# Patient Record
Sex: FEMALE | Race: BLACK OR AFRICAN AMERICAN | NOT HISPANIC OR LATINO | Employment: OTHER | URBAN - METROPOLITAN AREA
[De-identification: names, ages, dates, MRNs, and addresses within clinical notes are randomized per-mention and may not be internally consistent; named-entity substitution may affect disease eponyms.]

---

## 2020-06-25 ENCOUNTER — TELEPHONE (OUTPATIENT)
Dept: GERIATRICS | Age: 75
End: 2020-06-25

## 2020-07-14 ENCOUNTER — TELEPHONE (OUTPATIENT)
Dept: GERIATRICS | Age: 75
End: 2020-07-14

## 2020-07-14 NOTE — TELEPHONE ENCOUNTER
Attempted to contact patient's daughter to move appointment from 7/30/20 (with Dr Nuria Olivares) to 7/15/20 (with Dr Tere Halsted); phone line is busy

## 2020-07-15 NOTE — TELEPHONE ENCOUNTER
Spoke with patient's daughter; rescheduled Riri Assessment to 7/20/20   Family conference rescheduled and placed on the waiting list

## 2020-07-22 ENCOUNTER — TELEPHONE (OUTPATIENT)
Dept: GERIATRICS | Age: 75
End: 2020-07-22

## 2020-07-22 NOTE — TELEPHONE ENCOUNTER
Spoke with daughter Germán Claudio to clarify the reason for mom to be seen  She would like both a new PCP and a erika assessment for her mom  She is concerned that her mom is on the right medication and she is running out  After discussing the options and the services we can offer patient will start as PCP patient and as needed will have a erika assessment  Once patients medications are addressed then she wants mom to have a erika assesment and testing  PCP new patient forms will be emailed to daughter at [email or web address]  [address]  [phone]  Telephone Intake PCP     Referral Source: on line       Caller (and relationship to patient): Sean     (relationship to patient): Daughter   Phone Number: [phone]   Is caller POA? no    Reason for choosing Geriatric PCP: New to area wants a provider who specializes in elderly  What is the goal of visit? To make sure mom is has the correct diagnosis and medications

## 2020-07-30 ENCOUNTER — OFFICE VISIT (OUTPATIENT)
Dept: GERIATRICS | Age: 75
End: 2020-07-30
Payer: MEDICARE

## 2020-07-30 VITALS
HEART RATE: 77 BPM | DIASTOLIC BLOOD PRESSURE: 60 MMHG | RESPIRATION RATE: 16 BRPM | SYSTOLIC BLOOD PRESSURE: 120 MMHG | BODY MASS INDEX: 34.45 KG/M2 | TEMPERATURE: 98.2 F | HEIGHT: 65 IN | OXYGEN SATURATION: 96 % | WEIGHT: 206.8 LBS

## 2020-07-30 DIAGNOSIS — E11.9 TYPE 2 DIABETES MELLITUS WITHOUT COMPLICATION, WITHOUT LONG-TERM CURRENT USE OF INSULIN (HCC): ICD-10-CM

## 2020-07-30 DIAGNOSIS — M25.561 CHRONIC PAIN OF BOTH KNEES: ICD-10-CM

## 2020-07-30 DIAGNOSIS — F02.81 LATE ONSET ALZHEIMER'S DISEASE WITH BEHAVIORAL DISTURBANCE (HCC): Primary | ICD-10-CM

## 2020-07-30 DIAGNOSIS — F32.A DEPRESSION, UNSPECIFIED DEPRESSION TYPE: ICD-10-CM

## 2020-07-30 DIAGNOSIS — Z87.440 HISTORY OF RECURRENT UTIS: ICD-10-CM

## 2020-07-30 DIAGNOSIS — F33.1 MODERATE EPISODE OF RECURRENT MAJOR DEPRESSIVE DISORDER (HCC): ICD-10-CM

## 2020-07-30 DIAGNOSIS — M25.562 CHRONIC PAIN OF BOTH KNEES: ICD-10-CM

## 2020-07-30 DIAGNOSIS — E78.5 HYPERLIPIDEMIA, UNSPECIFIED HYPERLIPIDEMIA TYPE: ICD-10-CM

## 2020-07-30 DIAGNOSIS — G30.1 LATE ONSET ALZHEIMER'S DISEASE WITH BEHAVIORAL DISTURBANCE (HCC): Primary | ICD-10-CM

## 2020-07-30 DIAGNOSIS — R41.3 MEMORY LOSS: ICD-10-CM

## 2020-07-30 DIAGNOSIS — E78.49 OTHER HYPERLIPIDEMIA: ICD-10-CM

## 2020-07-30 DIAGNOSIS — G89.29 CHRONIC PAIN OF BOTH KNEES: ICD-10-CM

## 2020-07-30 DIAGNOSIS — G47.9 SLEEP DIFFICULTIES: ICD-10-CM

## 2020-07-30 DIAGNOSIS — E55.9 VITAMIN D DEFICIENCY: ICD-10-CM

## 2020-07-30 PROBLEM — F02.818 LATE ONSET ALZHEIMER'S DISEASE WITH BEHAVIORAL DISTURBANCE (HCC): Status: ACTIVE | Noted: 2020-07-30

## 2020-07-30 PROCEDURE — 99205 OFFICE O/P NEW HI 60 MIN: CPT | Performed by: STUDENT IN AN ORGANIZED HEALTH CARE EDUCATION/TRAINING PROGRAM

## 2020-07-30 RX ORDER — FERROUS SULFATE 325(65) MG
325 TABLET ORAL
COMMUNITY

## 2020-07-30 RX ORDER — TRAZODONE HYDROCHLORIDE 50 MG/1
50 TABLET ORAL
Qty: 90 TABLET | Refills: 2 | Status: CANCELLED | OUTPATIENT
Start: 2020-07-30

## 2020-07-30 RX ORDER — TRAMADOL HYDROCHLORIDE 50 MG/1
50 TABLET ORAL EVERY 6 HOURS PRN
Qty: 30 TABLET | Refills: 2 | Status: CANCELLED | OUTPATIENT
Start: 2020-07-30

## 2020-07-30 RX ORDER — TRAMADOL HYDROCHLORIDE 50 MG/1
50 TABLET ORAL EVERY 6 HOURS PRN
COMMUNITY
End: 2020-10-05 | Stop reason: SINTOL

## 2020-07-30 RX ORDER — BUPROPION HYDROCHLORIDE 150 MG/1
150 TABLET, EXTENDED RELEASE ORAL 2 TIMES DAILY
Qty: 90 TABLET | Refills: 2 | Status: CANCELLED | OUTPATIENT
Start: 2020-07-30

## 2020-07-30 RX ORDER — TRAZODONE HYDROCHLORIDE 50 MG/1
50 TABLET ORAL
COMMUNITY
End: 2020-11-18 | Stop reason: SDUPTHER

## 2020-07-30 RX ORDER — DONEPEZIL HYDROCHLORIDE 10 MG/1
10 TABLET, FILM COATED ORAL
Qty: 90 TABLET | Refills: 2 | Status: CANCELLED | OUTPATIENT
Start: 2020-07-30

## 2020-07-30 RX ORDER — LOSARTAN POTASSIUM 25 MG/1
25 TABLET ORAL DAILY
COMMUNITY
End: 2021-07-06 | Stop reason: SDUPTHER

## 2020-07-30 RX ORDER — AMLODIPINE BESYLATE 10 MG/1
10 TABLET ORAL DAILY
COMMUNITY
End: 2020-12-29 | Stop reason: SDUPTHER

## 2020-07-30 RX ORDER — RISPERIDONE 0.25 MG/1
0.25 TABLET, FILM COATED ORAL 2 TIMES DAILY
Qty: 90 TABLET | Refills: 2 | Status: SHIPPED | OUTPATIENT
Start: 2020-07-30 | End: 2020-11-18 | Stop reason: SDUPTHER

## 2020-07-30 RX ORDER — MEMANTINE HYDROCHLORIDE 10 MG/1
10 TABLET ORAL 2 TIMES DAILY
Qty: 90 TABLET | Refills: 2 | Status: SHIPPED | OUTPATIENT
Start: 2020-07-30 | End: 2020-11-18 | Stop reason: SDUPTHER

## 2020-07-30 RX ORDER — RIBOFLAVIN (VITAMIN B2) 100 MG
100 TABLET ORAL DAILY
COMMUNITY

## 2020-07-30 RX ORDER — CEPHALEXIN 500 MG/1
500 CAPSULE ORAL EVERY 6 HOURS SCHEDULED
COMMUNITY
End: 2021-06-21 | Stop reason: HOSPADM

## 2020-07-30 RX ORDER — CEPHALEXIN 500 MG/1
500 CAPSULE ORAL EVERY 6 HOURS SCHEDULED
Qty: 120 CAPSULE | Refills: 2 | Status: CANCELLED | OUTPATIENT
Start: 2020-07-30

## 2020-07-30 RX ORDER — HYDRALAZINE HYDROCHLORIDE 25 MG/1
25 TABLET, FILM COATED ORAL 3 TIMES DAILY
COMMUNITY
End: 2020-12-24 | Stop reason: SDUPTHER

## 2020-07-30 RX ORDER — DORZOLAMIDE HCL 20 MG/ML
1 SOLUTION/ DROPS OPHTHALMIC 3 TIMES DAILY
COMMUNITY

## 2020-07-30 RX ORDER — ATORVASTATIN CALCIUM 40 MG/1
40 TABLET, FILM COATED ORAL DAILY
Qty: 90 TABLET | Refills: 2 | Status: CANCELLED | OUTPATIENT
Start: 2020-07-30

## 2020-07-30 RX ORDER — SODIUM BICARBONATE 650 MG/1
650 TABLET ORAL 4 TIMES DAILY
COMMUNITY
End: 2020-10-06 | Stop reason: HOSPADM

## 2020-07-30 RX ORDER — MEMANTINE HYDROCHLORIDE 10 MG/1
10 TABLET ORAL 2 TIMES DAILY
COMMUNITY
End: 2020-07-30 | Stop reason: SDUPTHER

## 2020-07-30 RX ORDER — RISPERIDONE 0.25 MG/1
0.25 TABLET, FILM COATED ORAL 2 TIMES DAILY
COMMUNITY
End: 2020-07-30 | Stop reason: SDUPTHER

## 2020-07-30 RX ORDER — CETIRIZINE HYDROCHLORIDE 10 MG/1
10 TABLET ORAL DAILY
COMMUNITY

## 2020-07-30 NOTE — ASSESSMENT & PLAN NOTE
Previously diagnosed with Alzheimer's but no records available  Was being treated with memantine, respiridone, and donepezil  These were refilled recently  See "memory loss" for rest of plan

## 2020-07-30 NOTE — PROGRESS NOTES
Family Medicine Follow-Up Office Visit  Allison Devlin 77 y o  female   MRN: [id number] : 1945  ENCOUNTER: 2020 4:39 PM    Assessment and Plan   Memory loss  Poor short term memory, good long term memory per daughter  F/u visit for memory assessment including mind stream  Ordered TSH, B12 level, folate level, vitamin D level  Late onset Alzheimer's disease with behavioral disturbance [facility])  Previously diagnosed with Alzheimer's but no records available  Was being treated with memantine, respiridone, and donepezil  These were refilled recently  See "memory loss" for rest of plan  History of recurrent UTIs  8 UTIs in past year per daughter  Recently treated with keflex  Plan: UA, Urine Cx, referral to urology  Sleep difficulties  Poor sleep hygiene, sleeps with tv on  Treated with trazodone  Plan: Advised to turn off all electronics min 30 mins before bed time  Discussed sleep hygiene  Will revisit at f/u  Chronic pain of both knees  Being treated with tramadol  Plan: referral to orthopedics  Moderate episode of recurrent major depressive disorder ([facility] [address] )  Depression for most of her life  Treated with Trazodone  Refilled recently  No thoughts of harming self or others  Plan: referral to psychiatry and psychology  Advised to call clinic or go to ED if has thoughts of harming self or others, pt verbalizes understanding and is agreeable  Vitamin D deficiency  Currently on calcifediol  Will continue  Chief Complaint   Visit to establish care  History of Present Illness   Allison Devlin is a 77y o -year-old female who presents today to establish care  Daughter here with pt who is providing history as pt has cognitive impairment  She feels ok  C/o bilaterally knee pain  Daughter states that mother "impulsively blows her nose anytime she sees a tissue box "  · Sleep: sleep is not well  She will be wide awake at 3 am with tv on but then will sleep till 2 in the afternoon   She was taking sleeping pills, last one on Sunday night, and that seems to help regulate her sleep  Will sleep with TV on    · Memory: she can recall things of the past and has decent long-term memory but has a bad short-term memory  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 "I used to, but not now "  · Feeling like others have it better than you? "in the past, but not now "  · Sadness? yes  · Loss of interest? yes  · Social: lives with daughter (moved in this past June), prior to that she lived on her own, and before that with her son for 5 years  Just pt and daughter lives together for the most part  Daughter has a partner that lives with them  · Physical activity: no  · Fear of falling/feeling unsteady when walking? "Sometimes" (per pt)  Has had multiple falls in the past year  · Has a walker but daughter states that she doesn't want her using it only if they go for "long walks outside  She doesn't need it indoors because she starts to hunch over but when she doesn't use it she starts to become unsteady  I encourage her to steady herself "   · She had PT from February through April per daughter  · Difficulty paying bills? No paying responsibilities  · H/o health maintenance:  · Colonoscopy? Had in past   · Mammogram? Had in past   · Vaccines? Up to date as far as they know  · Last physical? About 1 year ago  · Has had 8 UTIs in the past year  · She was in inpatient psych       Past Medical History:   Diagnosis Date    Anxiety     Cholesterol depletion     Confusion     Dementia ([facility] [address] )     Depression     Diabetes ([facility] [address] )     Early onset Alzheimer's dementia ([facility] [address] )     Glaucoma     Hypertension     Hypertension     Memory loss      Family History Problem Relation Age of Onset    Diabetes Mother     Hypertension Mother      Social History     Socioeconomic History    Marital status: Single     Spouse name: Not on file    Number of children: 6    Years of education: 12    Highest education level: Bachelor's degree (khoi barba , BA, AB, BS)   Occupational History    Occupation: retired   Social Needs    Financial resource strain: Not hard at all   Spotster insecurity:     Worry: Never true     Inability: Never true   [place] needs:     Medical: No     Non-medical: No   Tobacco Use    Smoking status: Former Smoker     Types: Cigarettes     Last attempt to quit: 2018     Years since quittin 0    Smokeless tobacco: Never Used   Substance and Sexual Activity    Alcohol use: Not Currently     Frequency: Never    Drug use: Never    Sexual activity: Not Currently   Lifestyle    Physical activity:     Days per week: 0 days     Minutes per session: 0 min    Stress: Very much   Relationships    Social connections:     Talks on phone: Once a week     Gets together: Once a week     Attends Islam service: 1 to 4 times per year     Active member of club or organization: No     Attends meetings of clubs or organizations: Never     Relationship status: Patient refused    Intimate partner violence:     Fear of current or ex partner: Not on file     Emotionally abused: No     Physically abused: Not on file     Forced sexual activity: No   Other Topics Concern    Not on file   Social History Narrative    Not on file       Review of Systems   Review of Systems   Constitutional: Negative for chills and fever  HENT: Positive for rhinorrhea (always had it "all my life ")  Respiratory: Negative for cough and shortness of breath  Cardiovascular: Negative for chest pain  Gastrointestinal: Positive for constipation (sometimes)  Negative for blood in stool and diarrhea  Endocrine: Negative for polyuria  Genitourinary: Negative for frequency  Musculoskeletal: Positive for arthralgias (bilateral knees)  Neurological: Negative for syncope and light-headedness  Psychiatric/Behavioral: Positive for sleep disturbance (per daughter)  All other systems reviewed and are negative  Active Problem List     Patient Active Problem List   Diagnosis    Memory loss    History of recurrent UTIs    Sleep difficulties    Late onset Alzheimer's disease with behavioral disturbance (HCC)    Chronic pain of both knees    Moderate episode of recurrent major depressive disorder ([address] )    Vitamin D deficiency       Past Medical History, Past Surgical History, Family History, and Social History were reviewed and updated today as appropriate  Objective   /60 (BP Location: Left arm, Patient Position: Sitting, Cuff Size: Standard)   Pulse 77   Temp 98 2 °F (36 8 °C) (Temporal)   Resp 16   Ht 5' 5" (1 651 m)   Wt 93 8 kg (206 lb 12 8 oz)   SpO2 96%   BMI 34 41 kg/m²     Physical Exam   Constitutional: She is oriented to person, place, and time  No distress  HENT:   Nose: Nose normal    Mouth/Throat: No oropharyngeal exudate  Eyes: Pupils are equal, round, and reactive to light  Conjunctivae are normal  Right eye exhibits no discharge  Left eye exhibits no discharge  No scleral icterus  Neck: Neck supple  Cardiovascular: Normal rate, regular rhythm, normal heart sounds and intact distal pulses  Exam reveals no gallop and no friction rub  No murmur heard  Pulmonary/Chest: Effort normal and breath sounds normal  No stridor  No respiratory distress  She has no wheezes  She has no rales  She exhibits no tenderness  Abdominal: Soft  Bowel sounds are normal  She exhibits no distension and no mass (on superficial palpation)  There is no tenderness  There is no guarding  Musculoskeletal: She exhibits no edema (of BLE)  Lymphadenopathy:     She has no cervical adenopathy  Neurological: She is alert and oriented to person, place, and time  Skin: Skin is warm and dry  She is not diaphoretic  Psychiatric: She has a normal mood and affect  Pertinent Laboratory/Diagnostic Studies:  No results found for: GLUCOSE, BUN, CREATININE, CALCIUM, NA, K, CO2, CL  No results found for: ALT, AST, GGT, ALKPHOS, BILITOT    No results found for: WBC, HGB, HCT, MCV, PLT    No results found for: TSH    No results found for: CHOL  No results found for: TRIG  No results found for: HDL  No results found for: LDLCALC  No results found for: HGBA1C    No results found for this or any previous visit      Orders Placed This Encounter   Procedures    Urine culture    CBC and Platelet    Comprehensive metabolic panel    TSH, 3rd generation with T4 reflex    Vitamin B12    Folate    Vitamin D 25 hydroxy    UA (URINE) with reflex to Scope    Ambulatory referral to Psychiatry    Ambulatory referral to Urology    Ambulatory referral to Orthopedic Surgery    Ambulatory referral to Psychology         Current Medications     Current Outpatient Medications   Medication Sig Dispense Refill    amLODIPine (NORVASC) 10 mg tablet Take 10 mg by mouth daily      Ascorbic Acid (VITAMIN C) 100 MG tablet Take 100 mg by mouth daily      aspirin (ECOTRIN LOW STRENGTH) 81 mg EC tablet Take 81 mg by mouth daily      atorvastatin (LIPITOR) 40 mg tablet Take 40 mg by mouth daily      Brimonidine Tartrate-Timolol (COMBIGAN OP) Apply to eye      buPROPion (WELLBUTRIN SR) 150 mg 12 hr tablet Take 150 mg by mouth 2 (two) times a day      cephalexin (KEFLEX) 500 mg capsule Take 500 mg by mouth every 6 (six) hours      cetirizine (ZyrTEC) 10 mg tablet Take 10 mg by mouth daily      citalopram (CeleXA) 40 mg tablet Take 40 mg by mouth daily      Docusate Sodium (COLACE PO) Take by mouth      donepezil (ARICEPT) 10 mg tablet Take 10 mg by mouth daily at bedtime      dorzolamide (TRUSOPT) 2 % ophthalmic solution 1 drop 3 (three) times a day      ferrous sulfate 325 (65 Fe) mg tablet Take 325 mg by mouth daily with breakfast      hydrALAZINE (APRESOLINE) 25 mg tablet Take 25 mg by mouth 3 (three) times a day      Latanoprostene Bunod (Vyzulta) 0 024 % SOLN Apply to eye      linaGLIPtin (Tradjenta) 5 MG TABS Take 5 mg by mouth daily      losartan (COZAAR) 25 mg tablet Take 25 mg by mouth daily      memantine (NAMENDA) 10 mg tablet Take 1 tablet (10 mg total) by mouth 2 (two) times a day 90 tablet 2    Multiple Vitamins-Minerals (CENTRUM SILVER PO) Take by mouth      risperiDONE (RisperDAL) 0 25 mg tablet Take 1 tablet (0 25 mg total) by mouth 2 (two) times a day 90 tablet 2    sodium bicarbonate 650 mg tablet Take 650 mg by mouth 4 (four) times a day      traMADol (ULTRAM) 50 mg tablet Take 50 mg by mouth every 6 (six) hours as needed for moderate pain      traZODone (DESYREL) 50 mg tablet Take 50 mg by mouth daily at bedtime      Calcifediol ER (Rayaldee) 30 MCG CPCR Take 30 mcg by mouth daily 90 capsule 2    furosemide (LASIX) 20 mg tablet Take 20 mg by mouth 2 (two) times a day      omeprazole (PriLOSEC) 20 mg delayed release capsule Take 20 mg by mouth daily       No current facility-administered medications for this visit          ALLERGIES:  Allergies   Allergen Reactions    Codeine        Health Maintenance     Health Maintenance   Topic Date Due    Hepatitis C Screening  1945    Medicare Annual Wellness Visit (AWV)  1945    MAMMOGRAM  1945    CRC Screening: Colonoscopy  1945    DTaP,Tdap,and Td Vaccines (1 - Tdap) 06/07/1957    BMI: Followup Plan  06/07/1964    Fall Risk  06/07/2011    Pneumococcal Vaccine: 65+ Years (1 of 2 - PCV13) 06/07/2011    Influenza Vaccine  07/01/2020    BMI: Adult  07/30/2021    Pneumococcal Vaccine: Pediatrics (0 to 5 Years) and At-Risk Patients (6 to 59 Years)  Aged Out    HIB Vaccine  Aged Out    Hepatitis B Vaccine  Aged Out    IPV Vaccine  Aged Out    Hepatitis A Vaccine  Aged Out    Meningococcal ACWY Vaccine  Aged Out    HPV Vaccine  Aged Out       There is no immunization history on file for this patient  Nazia Ching MD   [address]  7/30/2020  4:39 PM    Parts of this note were dictated using M*Modal dictation software and may have sounds-like errors due to variation in pronunciation

## 2020-07-30 NOTE — ASSESSMENT & PLAN NOTE
Poor sleep hygiene, sleeps with tv on  Treated with trazodone  Plan: Advised to turn off all electronics min 30 mins before bed time  Discussed sleep hygiene  Will revisit at f/u

## 2020-07-30 NOTE — ASSESSMENT & PLAN NOTE
8 UTIs in past year per daughter  Recently treated with keflex  Plan: UA, Urine Cx, referral to urology

## 2020-07-30 NOTE — ASSESSMENT & PLAN NOTE
Poor short term memory, good long term memory per daughter  F/u visit for memory assessment including mind stream  Ordered TSH, B12 level, folate level, vitamin D level

## 2020-07-30 NOTE — ASSESSMENT & PLAN NOTE
Depression for most of her life  Treated with Trazodone  Refilled recently  No thoughts of harming self or others  Plan: referral to psychiatry and psychology  Advised to call clinic or go to ED if has thoughts of harming self or others, pt verbalizes understanding and is agreeable

## 2020-07-31 PROBLEM — E78.49 OTHER HYPERLIPIDEMIA: Status: ACTIVE | Noted: 2020-07-31

## 2020-07-31 PROBLEM — E11.9 TYPE 2 DIABETES MELLITUS WITHOUT COMPLICATION, WITHOUT LONG-TERM CURRENT USE OF INSULIN (HCC): Status: ACTIVE | Noted: 2020-07-31

## 2020-08-03 ENCOUNTER — TELEPHONE (OUTPATIENT)
Dept: GERIATRICS | Age: 75
End: 2020-08-03

## 2020-08-03 ENCOUNTER — TELEPHONE (OUTPATIENT)
Dept: PSYCHIATRY | Facility: CLINIC | Age: 75
End: 2020-08-03

## 2020-08-03 NOTE — TELEPHONE ENCOUNTER
Armen Montalvo called and would like to set up an apt for Maria Eugenia Koch can you give her a call back please there is a referral on file  Thank you

## 2020-08-11 ENCOUNTER — OFFICE VISIT (OUTPATIENT)
Dept: OBGYN CLINIC | Facility: CLINIC | Age: 75
End: 2020-08-11
Payer: MEDICARE

## 2020-08-11 ENCOUNTER — TRANSCRIBE ORDERS (OUTPATIENT)
Dept: LAB | Facility: CLINIC | Age: 75
End: 2020-08-11

## 2020-08-11 ENCOUNTER — APPOINTMENT (OUTPATIENT)
Dept: LAB | Facility: CLINIC | Age: 75
End: 2020-08-11
Payer: MEDICARE

## 2020-08-11 ENCOUNTER — APPOINTMENT (OUTPATIENT)
Dept: RADIOLOGY | Facility: AMBULARY SURGERY CENTER | Age: 75
End: 2020-08-11
Payer: MEDICARE

## 2020-08-11 VITALS
WEIGHT: 210 LBS | DIASTOLIC BLOOD PRESSURE: 69 MMHG | HEART RATE: 86 BPM | BODY MASS INDEX: 33.75 KG/M2 | HEIGHT: 66 IN | SYSTOLIC BLOOD PRESSURE: 125 MMHG

## 2020-08-11 DIAGNOSIS — Z87.440 HISTORY OF RECURRENT UTIS: ICD-10-CM

## 2020-08-11 DIAGNOSIS — G89.29 CHRONIC PAIN OF BOTH KNEES: ICD-10-CM

## 2020-08-11 DIAGNOSIS — M25.561 CHRONIC PAIN OF BOTH KNEES: ICD-10-CM

## 2020-08-11 DIAGNOSIS — G89.29 CHRONIC PAIN OF BOTH KNEES: Primary | ICD-10-CM

## 2020-08-11 DIAGNOSIS — G30.1 LATE ONSET ALZHEIMER'S DISEASE WITH BEHAVIORAL DISTURBANCE (HCC): ICD-10-CM

## 2020-08-11 DIAGNOSIS — M25.562 CHRONIC PAIN OF BOTH KNEES: ICD-10-CM

## 2020-08-11 DIAGNOSIS — F02.81 LATE ONSET ALZHEIMER'S DISEASE WITH BEHAVIORAL DISTURBANCE (HCC): ICD-10-CM

## 2020-08-11 DIAGNOSIS — R41.3 MEMORY LOSS: ICD-10-CM

## 2020-08-11 DIAGNOSIS — E11.9 TYPE 2 DIABETES MELLITUS WITHOUT COMPLICATION, WITHOUT LONG-TERM CURRENT USE OF INSULIN (HCC): ICD-10-CM

## 2020-08-11 DIAGNOSIS — M25.561 CHRONIC PAIN OF BOTH KNEES: Primary | ICD-10-CM

## 2020-08-11 DIAGNOSIS — E78.49 OTHER HYPERLIPIDEMIA: ICD-10-CM

## 2020-08-11 DIAGNOSIS — M25.562 CHRONIC PAIN OF BOTH KNEES: Primary | ICD-10-CM

## 2020-08-11 LAB
25(OH)D3 SERPL-MCNC: 74.5 NG/ML (ref 30–100)
ALBUMIN SERPL BCP-MCNC: 3.5 G/DL (ref 3.5–5)
ALP SERPL-CCNC: 93 U/L (ref 46–116)
ALT SERPL W P-5'-P-CCNC: 23 U/L (ref 12–78)
ANION GAP SERPL CALCULATED.3IONS-SCNC: 9 MMOL/L (ref 4–13)
AST SERPL W P-5'-P-CCNC: 16 U/L (ref 5–45)
BACTERIA UR QL AUTO: ABNORMAL /HPF
BILIRUB SERPL-MCNC: 0.48 MG/DL (ref 0.2–1)
BILIRUB UR QL STRIP: NEGATIVE
BUN SERPL-MCNC: 26 MG/DL (ref 5–25)
CALCIUM SERPL-MCNC: 9.5 MG/DL (ref 8.3–10.1)
CHLORIDE SERPL-SCNC: 109 MMOL/L (ref 100–108)
CHOLEST SERPL-MCNC: 154 MG/DL (ref 50–200)
CLARITY UR: CLEAR
CO2 SERPL-SCNC: 24 MMOL/L (ref 21–32)
COLOR UR: YELLOW
CREAT SERPL-MCNC: 1.97 MG/DL (ref 0.6–1.3)
CREAT UR-MCNC: 92.8 MG/DL
ERYTHROCYTE [DISTWIDTH] IN BLOOD BY AUTOMATED COUNT: 13.5 % (ref 11.6–15.1)
EST. AVERAGE GLUCOSE BLD GHB EST-MCNC: 111 MG/DL
FOLATE SERPL-MCNC: >20 NG/ML (ref 3.1–17.5)
GFR SERPL CREATININE-BSD FRML MDRD: 28 ML/MIN/1.73SQ M
GLUCOSE P FAST SERPL-MCNC: 81 MG/DL (ref 65–99)
GLUCOSE UR STRIP-MCNC: NEGATIVE MG/DL
HBA1C MFR BLD: 5.5 %
HCT VFR BLD AUTO: 32.8 % (ref 34.8–46.1)
HDLC SERPL-MCNC: 65 MG/DL
HGB BLD-MCNC: 10 G/DL (ref 11.5–15.4)
HGB UR QL STRIP.AUTO: NEGATIVE
KETONES UR STRIP-MCNC: NEGATIVE MG/DL
LDLC SERPL CALC-MCNC: 81 MG/DL (ref 0–100)
LEUKOCYTE ESTERASE UR QL STRIP: NEGATIVE
MCH RBC QN AUTO: 28.5 PG (ref 26.8–34.3)
MCHC RBC AUTO-ENTMCNC: 30.5 G/DL (ref 31.4–37.4)
MCV RBC AUTO: 93 FL (ref 82–98)
MICROALBUMIN UR-MCNC: 66.2 MG/L (ref 0–20)
MICROALBUMIN/CREAT 24H UR: 71 MG/G CREATININE (ref 0–30)
NITRITE UR QL STRIP: POSITIVE
NON-SQ EPI CELLS URNS QL MICRO: ABNORMAL /HPF
NONHDLC SERPL-MCNC: 89 MG/DL
PH UR STRIP.AUTO: 6 [PH]
PLATELET # BLD AUTO: 221 THOUSANDS/UL (ref 149–390)
PMV BLD AUTO: 10 FL (ref 8.9–12.7)
POTASSIUM SERPL-SCNC: 4.4 MMOL/L (ref 3.5–5.3)
PROT SERPL-MCNC: 6.6 G/DL (ref 6.4–8.2)
PROT UR STRIP-MCNC: NEGATIVE MG/DL
RBC # BLD AUTO: 3.51 MILLION/UL (ref 3.81–5.12)
RBC #/AREA URNS AUTO: ABNORMAL /HPF
SODIUM SERPL-SCNC: 142 MMOL/L (ref 136–145)
SP GR UR STRIP.AUTO: 1.02 (ref 1–1.03)
TRIGL SERPL-MCNC: 41 MG/DL
TSH SERPL DL<=0.05 MIU/L-ACNC: 3.12 UIU/ML (ref 0.36–3.74)
UROBILINOGEN UR QL STRIP.AUTO: 0.2 E.U./DL
VIT B12 SERPL-MCNC: 729 PG/ML (ref 100–900)
WBC # BLD AUTO: 5.48 THOUSAND/UL (ref 4.31–10.16)
WBC #/AREA URNS AUTO: ABNORMAL /HPF

## 2020-08-11 PROCEDURE — 81001 URINALYSIS AUTO W/SCOPE: CPT | Performed by: STUDENT IN AN ORGANIZED HEALTH CARE EDUCATION/TRAINING PROGRAM

## 2020-08-11 PROCEDURE — 87077 CULTURE AEROBIC IDENTIFY: CPT

## 2020-08-11 PROCEDURE — 84443 ASSAY THYROID STIM HORMONE: CPT

## 2020-08-11 PROCEDURE — 86592 SYPHILIS TEST NON-TREP QUAL: CPT

## 2020-08-11 PROCEDURE — 82043 UR ALBUMIN QUANTITATIVE: CPT | Performed by: STUDENT IN AN ORGANIZED HEALTH CARE EDUCATION/TRAINING PROGRAM

## 2020-08-11 PROCEDURE — 82607 VITAMIN B-12: CPT

## 2020-08-11 PROCEDURE — 99204 OFFICE O/P NEW MOD 45 MIN: CPT | Performed by: PHYSICAL MEDICINE & REHABILITATION

## 2020-08-11 PROCEDURE — 87086 URINE CULTURE/COLONY COUNT: CPT

## 2020-08-11 PROCEDURE — 83036 HEMOGLOBIN GLYCOSYLATED A1C: CPT

## 2020-08-11 PROCEDURE — 85027 COMPLETE CBC AUTOMATED: CPT

## 2020-08-11 PROCEDURE — 82746 ASSAY OF FOLIC ACID SERUM: CPT

## 2020-08-11 PROCEDURE — 36415 COLL VENOUS BLD VENIPUNCTURE: CPT

## 2020-08-11 PROCEDURE — 87186 SC STD MICRODIL/AGAR DIL: CPT

## 2020-08-11 PROCEDURE — 82570 ASSAY OF URINE CREATININE: CPT | Performed by: STUDENT IN AN ORGANIZED HEALTH CARE EDUCATION/TRAINING PROGRAM

## 2020-08-11 PROCEDURE — 80061 LIPID PANEL: CPT

## 2020-08-11 PROCEDURE — 73562 X-RAY EXAM OF KNEE 3: CPT

## 2020-08-11 PROCEDURE — 20610 DRAIN/INJ JOINT/BURSA W/O US: CPT | Performed by: PHYSICAL MEDICINE & REHABILITATION

## 2020-08-11 PROCEDURE — 80053 COMPREHEN METABOLIC PANEL: CPT

## 2020-08-11 PROCEDURE — 82306 VITAMIN D 25 HYDROXY: CPT

## 2020-08-11 RX ORDER — TRIAMCINOLONE ACETONIDE 40 MG/ML
40 INJECTION, SUSPENSION INTRA-ARTICULAR; INTRAMUSCULAR
Status: COMPLETED | OUTPATIENT
Start: 2020-08-11 | End: 2020-08-11

## 2020-08-11 RX ORDER — LIDOCAINE HYDROCHLORIDE 10 MG/ML
5 INJECTION, SOLUTION INFILTRATION; PERINEURAL
Status: COMPLETED | OUTPATIENT
Start: 2020-08-11 | End: 2020-08-11

## 2020-08-11 RX ADMIN — LIDOCAINE HYDROCHLORIDE 5 ML: 10 INJECTION, SOLUTION INFILTRATION; PERINEURAL at 15:25

## 2020-08-11 RX ADMIN — TRIAMCINOLONE ACETONIDE 40 MG: 40 INJECTION, SUSPENSION INTRA-ARTICULAR; INTRAMUSCULAR at 15:25

## 2020-08-11 NOTE — PROGRESS NOTES
1  Chronic pain of both knees  XR knee 3 vw left non injury    XR knee 3 vw right non injury    Large joint arthrocentesis: R knee    Large joint arthrocentesis: L knee     Orders Placed This Encounter   Procedures    Large joint arthrocentesis: R knee    Large joint arthrocentesis: L knee    XR knee 3 vw left non injury    XR knee 3 vw right non injury        Imaging Studies (I personally reviewed images in PACS and report):  Bilateral knee x-rays most recent to this encounter reviewed  These images show bilateral patellofemoral arthropathy with decreased joint space in this region along with osteophytosis  Both medial and lateral joint spaces are narrowed with osteophytosis  These findings are consistent with Leretha Hammock grade 4 osteoarthritis  No acute osseous abnormalities  Impression:  Bilateral knee pain likely secondary to osteoarthritis as above  The patient had viscosupplementation that she just finished this past June in [facility]  We discussed different treatment options and decided to proceed with steroid injections of both of her knees  Please see the procedure note below  She had immediate relief of her symptoms afterwards  She can take Tylenol as we discussed  I will see her back in 6 weeks if needed  Return in about 6 weeks (around 9/22/2020)  HPI:  Sneha Delatorre is a 77 y o  female  who presents for evaluation of   Chief Complaint   Patient presents with    Right Knee - Pain    Left Knee - Pain     Most of the HPI is taken from her daughter due to the patient having Alzheimer's dementia  Onset/Mechanism:  Chronic pain that she has had for quite some time now  She was living in [facility] and had gel injections that finish this past June  She had minimal relief from this  Location: In front of the knee  Radiation:  Denies  Quality:  Aching  Provocative:  Stairs  Severity:  Depends on what she is doing  Associated Symptoms:  Trouble walking    Treatment so far: Gel injections in [facility]  Review of Systems   Constitutional: Positive for activity change  Negative for fever  HENT: Negative for sore throat  Eyes: Negative for visual disturbance  Respiratory: Negative for shortness of breath  Cardiovascular: Negative for chest pain  Gastrointestinal: Negative for abdominal pain  Endocrine: Negative for polydipsia  Genitourinary: Negative for difficulty urinating  Musculoskeletal: Positive for arthralgias  Skin: Negative for rash  Allergic/Immunologic: Negative for immunocompromised state  Neurological: Negative for numbness  Hematological: Does not bruise/bleed easily  Psychiatric/Behavioral: Negative for confusion  Following history reviewed and updated:  Past Medical History:   Diagnosis Date    Anxiety     Cholesterol depletion     Confusion     Dementia ([facility] [address] )     Depression     [facility]     Early onset Alzheimer's dementia ([facility] )     Glaucoma     Hypertension     Hypertension     Memory loss      Past Surgical History:   Procedure Laterality Date    HYSTERECTOMY       Social History   Social History     Substance and Sexual Activity   Alcohol Use Not Currently    Frequency: Never     Social History     Substance and Sexual Activity   Drug Use Never     Social History     Tobacco Use   Smoking Status Former Smoker    Types: Cigarettes    Last attempt to quit: 2018    Years since quittin 0   Smokeless Tobacco Never Used     Family History   Problem Relation Age of Onset    Diabetes Mother     Hypertension Mother      Allergies   Allergen Reactions    Codeine         Constitutional:  /69   Pulse 86   Ht 5' 6" (1 676 m)   Wt 95 3 kg (210 lb)   BMI 33 89 kg/m²    General: NAD  Eyes: Anicteric sclerae  Neck: Supple  Lungs: Unlabored breathing  Cardiovascular: No lower extremity edema  Skin: Intact without erythema  Neurologic: Sensation intact to light touch    Psychiatric: Mood and affect are appropriate  Right Knee Exam     Tenderness   The patient is experiencing tenderness in the medial joint line  Range of Motion   Extension: -10   Flexion: abnormal     Other   Erythema: absent  Scars: absent  Sensation: normal  Pulse: present  Swelling: none  Effusion: no effusion present    Comments:  Oblique surgical scar noted on the right leg  Left Knee Exam     Tenderness   The patient is experiencing tenderness in the medial joint line  Range of Motion   Extension: normal   Flexion: abnormal     Other   Erythema: absent  Scars: absent  Sensation: normal  Pulse: present  Swelling: none  Effusion: no effusion present             Large joint arthrocentesis: R knee  Date/Time: 8/11/2020 3:25 PM  Consent given by: patient  Site marked: site marked  Supporting Documentation  Indications: pain   Procedure Details  Location: knee - R knee  Needle size: 20 G  Ultrasound guidance: no  Approach: anterolateral  Medications administered: 5 mL lidocaine 1 %; 40 mg triamcinolone acetonide 40 mg/mL    Patient tolerance: patient tolerated the procedure well with no immediate complications  Dressing:  Sterile dressing applied    A 2 inch needle was used and I was able to hub the entire needle  This makes it fairly certain that I am within the intercondylar notch/joint space  There was no resistance encountered during the actual injection  Large joint arthrocentesis: L knee  Date/Time: 8/11/2020 3:25 PM  Consent given by: patient  Site marked: site marked  Supporting Documentation  Indications: pain   Procedure Details  Location: knee - L knee  Needle size: 20 G  Ultrasound guidance: no  Approach: anterolateral  Medications administered: 5 mL lidocaine 1 %; 40 mg triamcinolone acetonide 40 mg/mL    Patient tolerance: patient tolerated the procedure well with no immediate complications  Dressing:  Sterile dressing applied    A 2 inch needle was used and I was able to hub the entire needle    This makes it fairly certain that I am within the intercondylar notch/joint space  There was no resistance encountered during the actual injection  Portions of the record may have been created with voice recognition software  Occasional wrong word or "sound a like" substitutions may have occurred due to the inherent limitations of voice recognition software  Read the chart carefully and recognize, using context, where substitutions have occurred

## 2020-08-11 NOTE — PATIENT INSTRUCTIONS
You had a cortisone (steroid) injection  There are two medicines in this injection, a numbing medicine and a steroid  The numbing medication component usually takes effect within a few minutes and wears off after a few hours, after which your pain may return  The steroid medication typically takes effect in 3-5 days, although some people have benefits sooner, and lasts for a much longer time  You may take over-the-counter pain medicine  Acetaminophen (Tylenol) may be taken up to 1000 mg every 8 hours up to 3000 mg/day  An extra-strength Tylenol tablet is 500 mg

## 2020-08-12 LAB — RPR SER QL: NORMAL

## 2020-08-13 ENCOUNTER — TELEPHONE (OUTPATIENT)
Dept: GERIATRICS | Age: 75
End: 2020-08-13

## 2020-08-13 ENCOUNTER — TELEPHONE (OUTPATIENT)
Dept: PSYCHIATRY | Facility: CLINIC | Age: 75
End: 2020-08-13

## 2020-08-13 DIAGNOSIS — N30.00 ACUTE CYSTITIS WITHOUT HEMATURIA: Primary | ICD-10-CM

## 2020-08-13 LAB — BACTERIA UR CULT: ABNORMAL

## 2020-08-13 RX ORDER — NITROFURANTOIN 25; 75 MG/1; MG/1
100 CAPSULE ORAL 2 TIMES DAILY
Qty: 10 CAPSULE | Refills: 0 | Status: SHIPPED | OUTPATIENT
Start: 2020-08-13 | End: 2021-06-21 | Stop reason: HOSPADM

## 2020-08-13 NOTE — TELEPHONE ENCOUNTER
----- Message from Benjamin Pressley MD sent at 8/13/2020 11:32 AM EDT -----  Can you please call the patient's daughter and let her know about we just got the final urine culture results back which shows that the patient does have a urinary tract infection  Due to daughter's history of patient's confusion, we will opt to treat    Will send nitrofurantoin to [facility] pharmacy in [place]

## 2020-08-13 NOTE — PROGRESS NOTES
Urine culture positive for E coli susceptible to Macrobid    Her prescription for Macrobid 100 mg b i d  x5 days and to [facility] in [facility] [place]

## 2020-08-17 ENCOUNTER — TELEPHONE (OUTPATIENT)
Dept: GERIATRICS | Age: 75
End: 2020-08-17

## 2020-08-17 NOTE — TELEPHONE ENCOUNTER
LMOM for Klaus regarding psychiatry/psychology referrals and noted that intake dept had reached out last week to schedule per chart  Provided their intake line, [phone], as well as our main office number, [phone], for any questions

## 2020-08-26 NOTE — TELEPHONE ENCOUNTER
Contacted Lucius regarding this referral again  Lucius reports that she takes all phone calls about her mom's appointments and she had not received any messages from psych or our office  She is unable to write down the phone number for scheduling; I offered to email it to her  Emailed the behavioral health intake number [phone] to Lucius at [email or web address]  LCSW to remain available as needed

## 2020-09-04 ENCOUNTER — TELEPHONE (OUTPATIENT)
Dept: GERIATRICS | Age: 75
End: 2020-09-04

## 2020-09-04 NOTE — TELEPHONE ENCOUNTER
MARY CARMEN and emailed [facility] inquiring about scheduling psychiatry/psychology for Alfonso Valencia

## 2020-09-08 NOTE — TELEPHONE ENCOUNTER
Received email response from Nubia stating that they are seeking a psychiatrist in the [place] [facility] [place] area  I sent Lucius a link to explore options for psychiatry on psychologytoday  com and asked that she let our office know when this is scheduled

## 2020-09-22 ENCOUNTER — OFFICE VISIT (OUTPATIENT)
Dept: UROLOGY | Facility: CLINIC | Age: 75
End: 2020-09-22
Payer: MEDICARE

## 2020-09-22 ENCOUNTER — OFFICE VISIT (OUTPATIENT)
Dept: OBGYN CLINIC | Facility: CLINIC | Age: 75
End: 2020-09-22
Payer: MEDICARE

## 2020-09-22 VITALS
SYSTOLIC BLOOD PRESSURE: 146 MMHG | HEART RATE: 60 BPM | HEIGHT: 66 IN | TEMPERATURE: 97.8 F | BODY MASS INDEX: 33.27 KG/M2 | WEIGHT: 207 LBS | DIASTOLIC BLOOD PRESSURE: 76 MMHG

## 2020-09-22 VITALS
TEMPERATURE: 97.5 F | HEART RATE: 69 BPM | SYSTOLIC BLOOD PRESSURE: 156 MMHG | BODY MASS INDEX: 33.41 KG/M2 | DIASTOLIC BLOOD PRESSURE: 68 MMHG | HEIGHT: 66 IN

## 2020-09-22 DIAGNOSIS — Z12.11 ENCOUNTER FOR SCREENING COLONOSCOPY: ICD-10-CM

## 2020-09-22 DIAGNOSIS — G89.29 CHRONIC PAIN OF BOTH KNEES: ICD-10-CM

## 2020-09-22 DIAGNOSIS — M25.561 CHRONIC PAIN OF BOTH KNEES: ICD-10-CM

## 2020-09-22 DIAGNOSIS — M25.562 CHRONIC PAIN OF BOTH KNEES: ICD-10-CM

## 2020-09-22 DIAGNOSIS — M17.0 BILATERAL PRIMARY OSTEOARTHRITIS OF KNEE: Primary | ICD-10-CM

## 2020-09-22 DIAGNOSIS — N30.00 ACUTE CYSTITIS WITHOUT HEMATURIA: ICD-10-CM

## 2020-09-22 DIAGNOSIS — N39.0 URINARY TRACT INFECTION WITHOUT HEMATURIA, SITE UNSPECIFIED: Primary | ICD-10-CM

## 2020-09-22 LAB
POST-VOID RESIDUAL VOLUME, ML POC: 53 ML
SL AMB  POCT GLUCOSE, UA: NORMAL
SL AMB LEUKOCYTE ESTERASE,UA: NORMAL
SL AMB POCT BILIRUBIN,UA: NORMAL
SL AMB POCT BLOOD,UA: NORMAL
SL AMB POCT CLARITY,UA: NORMAL
SL AMB POCT COLOR,UA: YELLOW
SL AMB POCT KETONES,UA: NORMAL
SL AMB POCT NITRITE,UA: POSITIVE
SL AMB POCT PH,UA: 6
SL AMB POCT SPECIFIC GRAVITY,UA: 1.01
SL AMB POCT URINE PROTEIN: NORMAL
SL AMB POCT UROBILINOGEN: 0.2

## 2020-09-22 PROCEDURE — 51798 US URINE CAPACITY MEASURE: CPT | Performed by: PHYSICIAN ASSISTANT

## 2020-09-22 PROCEDURE — 87077 CULTURE AEROBIC IDENTIFY: CPT | Performed by: PHYSICIAN ASSISTANT

## 2020-09-22 PROCEDURE — 81002 URINALYSIS NONAUTO W/O SCOPE: CPT | Performed by: PHYSICIAN ASSISTANT

## 2020-09-22 PROCEDURE — 87186 SC STD MICRODIL/AGAR DIL: CPT | Performed by: PHYSICIAN ASSISTANT

## 2020-09-22 PROCEDURE — 87086 URINE CULTURE/COLONY COUNT: CPT | Performed by: PHYSICIAN ASSISTANT

## 2020-09-22 PROCEDURE — 99214 OFFICE O/P EST MOD 30 MIN: CPT | Performed by: PHYSICAL MEDICINE & REHABILITATION

## 2020-09-22 PROCEDURE — 99203 OFFICE O/P NEW LOW 30 MIN: CPT | Performed by: PHYSICIAN ASSISTANT

## 2020-09-22 RX ORDER — CEPHALEXIN 250 MG/1
250 CAPSULE ORAL EVERY 8 HOURS SCHEDULED
Qty: 15 CAPSULE | Refills: 0 | Status: SHIPPED | OUTPATIENT
Start: 2020-09-22 | End: 2020-09-27

## 2020-09-22 NOTE — PROGRESS NOTES
1  Bilateral primary osteoarthritis of knee  diclofenac sodium (VOLTAREN) 1 %   2  Chronic pain of both knees     3  Encounter for screening colonoscopy  Ambulatory referral for colon cancer education     Orders Placed This Encounter   Procedures    Ambulatory referral for colon cancer education        Imaging Studies (I personally reviewed images in PACS and report):  Bilateral knee x-rays most recent to this encounter reviewed  These images show bilateral patellofemoral arthropathy with decreased joint space in this region along with osteophytosis  Both medial and lateral joint spaces are narrowed with osteophytosis  These findings are consistent with Lizbeth Efren grade 4 osteoarthritis  No acute osseous abnormalities      Impression:  Bilateral knee pain likely secondary to osteoarthritis as above  The patient had viscosupplementation that she just finished this past June in [facility]  We discussed different treatment options and decided to proceed with steroid injections into both of her knees on her initial visit with me  She has had modest relief after these injections  She continues to have tenderness along the medial joint line but is able to continue her activities of daily living  We decided to proceed with a medial  for her left knee as it is more bothersome to her  Can consider using 1 for the right knee if it helps with the left  I have also prescribed her Voltaren gel that she can use as needed  I will see her back in December at which point we can repeat viscosupplementation  Return in about 3 months (around 12/22/2020)  HPI:  Bri Garvey is a 77 y o  female  who presents in follow up  Here for   Chief Complaint   Patient presents with    Follow-up       Date of injury:  Chronic  Trajectory of symptoms:  She finished viscosupplementation series this past June and had a steroid injection a couple of weeks ago by me    Her pain has improved but she continues to have pain along the medial aspect of the knee  Review of Systems   Constitutional: Positive for activity change  Negative for fever  HENT: Negative for sore throat  Eyes: Negative for visual disturbance  Respiratory: Negative for shortness of breath  Cardiovascular: Negative for chest pain  Gastrointestinal: Negative for abdominal pain  Endocrine: Negative for polydipsia  Genitourinary: Negative for difficulty urinating  Musculoskeletal: Positive for arthralgias and gait problem  Skin: Negative for rash  Allergic/Immunologic: Negative for immunocompromised state  Neurological: Negative for weakness and numbness  Hematological: Does not bruise/bleed easily  Psychiatric/Behavioral: Negative for confusion  Following history reviewed and updated:  Past Medical History:   Diagnosis Date    Anxiety     Cholesterol depletion     Confusion     [facility]     Depression     [facility]     [facility] onset [facility]'s dementia ([facility] )     Glaucoma     Hypertension     Hypertension     Memory loss      Past Surgical History:   Procedure Laterality Date    HYSTERECTOMY       Social History   Social History     Substance and Sexual Activity   Alcohol Use Not Currently    Frequency: Never     Social History     Substance and Sexual Activity   Drug Use Never     Social History     Tobacco Use   Smoking Status Former Smoker    Types: Cigarettes    Last attempt to quit: 2018    Years since quittin 1   Smokeless Tobacco Never Used     Family History   Problem Relation Age of Onset    Diabetes Mother     Hypertension Mother      Allergies   Allergen Reactions    Codeine         Constitutional:  /76   Pulse 60   Temp 97 8 °F (36 6 °C)   Ht 5' 6" (1 676 m)   Wt 93 9 kg (207 lb)   BMI 33 41 kg/m²    General: NAD  Eyes: Clear sclerae  ENT: No inflammation, lesion, or mass of lips  No tracheal deviation  Musculoskeletal: As mentioned below    Integumentary: No visible rashes or skin lesions  Pulmonary/Chest: Effort normal  No respiratory distress  Neuro: CN's grossly intact, CLANCY  Psych: Normal affect and judgement  Vascular: WWP  Right Knee Exam     Tenderness   The patient is experiencing tenderness in the medial joint line  Range of Motion   Extension: normal   Flexion: abnormal     Other   Erythema: absent  Scars: absent  Sensation: normal  Pulse: present      Left Knee Exam     Tenderness   The patient is experiencing tenderness in the medial joint line      Range of Motion   Extension: normal   Flexion: abnormal     Other   Erythema: absent  Scars: absent  Sensation: normal  Pulse: present             Procedures

## 2020-09-22 NOTE — PROGRESS NOTES
1  Urinary tract infection without hematuria, site unspecified  POCT urine dip    POCT Measure PVR   2  Acute cystitis without hematuria  cephalexin (KEFLEX) 250 mg capsule    Urine culture    US kidney and bladder with pvr        Assessment and plan:       1  Recurrent urinary infections  - Patient's renal function compromised  Will obtain an US of kidney and bladder to evaluate for urologic abnormality  Will coordinate for cystoscopy for further evaluation given possibility of mesh erosion  Encouraged continued hdyration and continued monitoring  keflex sent for coverage at this time given reported mental status change per family  All questions answered  Efren Antony PA-C      Chief Complaint     Recurrent urinary infections    History of Present Illness     Nadia Khanna is a 77 y o  female presenting today as a new patient for recurrent urinary tract infections  Patient had recently seen her primary care provider  Had noted approximately 8 urinary infections over the course of the past year  Her last urine culture on 08/11/2020 was positive for E coli growth  Patient recently relocated to the area  Denies ever seeing any urologist in the past   Denies any history of  surgical manipulation that she is aware of  She does recall hysterectomy in her 35s for uterine prolapse  Unaware if any vaginal mesh was placed at that time  Patient is asymptomatic of the bacteriuria  Denies any dysuria gross hematuria, suprapubic pressure, flank pain, nausea, vomiting, fevers, or chills  Family notices worsening mental status with infection  Medical comorbidities include diabetes with a recent hemoglobin A1c of 5 5, Alzheimers  Urine dip leukocyte negative, nitrite positive, negative blood    PVR 53mL    Laboratory     Lab Results   Component Value Date    CREATININE 1 97 (H) 08/11/2020       Review of Systems     Review of Systems   Constitutional: Negative for activity change, appetite change, chills, diaphoresis, fatigue, fever and unexpected weight change  Respiratory: Negative for chest tightness and shortness of breath  Cardiovascular: Negative for chest pain, palpitations and leg swelling  Gastrointestinal: Negative for abdominal distention, abdominal pain, constipation, diarrhea, nausea and vomiting  Genitourinary: Negative for decreased urine volume, difficulty urinating, dysuria, enuresis, flank pain, frequency, genital sores, hematuria and urgency  Musculoskeletal: Negative for back pain, gait problem and myalgias  Skin: Negative for color change, pallor, rash and wound  Psychiatric/Behavioral: Negative for behavioral problems  The patient is not nervous/anxious  Allergies     Allergies   Allergen Reactions    Codeine        Physical Exam     Physical Exam  Constitutional:       General: She is not in acute distress  Appearance: Normal appearance  She is normal weight  She is not ill-appearing, toxic-appearing or diaphoretic  HENT:      Head: Normocephalic and atraumatic  Eyes:      General:         Right eye: No discharge  Left eye: No discharge  Conjunctiva/sclera: Conjunctivae normal    Pulmonary:      Effort: Pulmonary effort is normal  No respiratory distress  Musculoskeletal: Normal range of motion  Neurological:      Mental Status: She is alert  Comments: Alert  Periodically confused throughout exam   Psychiatric:         Mood and Affect: Mood normal          Behavior: Behavior normal          Thought Content:  Thought content normal          Judgment: Judgment normal            Vital Signs     Vitals:    09/22/20 1516   BP: 156/68   BP Location: Left arm   Patient Position: Sitting   Cuff Size: Standard   Pulse: 69   Temp: 97 5 °F (36 4 °C)   TempSrc: Temporal   Height: 5' 6" (1 676 m)         Current Medications       Current Outpatient Medications:     amLODIPine (NORVASC) 10 mg tablet, Take 10 mg by mouth daily, Disp: , Rfl:     Ascorbic Acid (VITAMIN C) 100 MG tablet, Take 100 mg by mouth daily, Disp: , Rfl:     aspirin (ECOTRIN LOW STRENGTH) 81 mg EC tablet, Take 81 mg by mouth daily, Disp: , Rfl:     atorvastatin (LIPITOR) 40 mg tablet, Take 40 mg by mouth daily, Disp: , Rfl:     Brimonidine Tartrate-Timolol (COMBIGAN OP), Apply to eye, Disp: , Rfl:     buPROPion (WELLBUTRIN SR) 150 mg 12 hr tablet, Take 150 mg by mouth 2 (two) times a day, Disp: , Rfl:     Calcifediol ER (Rayaldee) 30 MCG CPCR, Take 30 mcg by mouth daily, Disp: 90 capsule, Rfl: 2    cephalexin (KEFLEX) 500 mg capsule, Take 500 mg by mouth every 6 (six) hours, Disp: , Rfl:     cetirizine (ZyrTEC) 10 mg tablet, Take 10 mg by mouth daily, Disp: , Rfl:     citalopram (CeleXA) 40 mg tablet, Take 40 mg by mouth daily, Disp: , Rfl:     diclofenac sodium (VOLTAREN) 1 %, Apply 2 g topically 3 (three) times a day as needed (Pain), Disp: 100 g, Rfl: 1    Docusate Sodium (COLACE PO), Take by mouth, Disp: , Rfl:     donepezil (ARICEPT) 10 mg tablet, Take 10 mg by mouth daily at bedtime, Disp: , Rfl:     dorzolamide (TRUSOPT) 2 % ophthalmic solution, 1 drop 3 (three) times a day, Disp: , Rfl:     ferrous sulfate 325 (65 Fe) mg tablet, Take 325 mg by mouth daily with breakfast, Disp: , Rfl:     furosemide (LASIX) 20 mg tablet, Take 20 mg by mouth 2 (two) times a day, Disp: , Rfl:     hydrALAZINE (APRESOLINE) 25 mg tablet, Take 25 mg by mouth 3 (three) times a day, Disp: , Rfl:     Latanoprostene Bunod (Vyzulta) 0 024 % SOLN, Apply to eye, Disp: , Rfl:     linaGLIPtin (Tradjenta) 5 MG TABS, Take 5 mg by mouth daily, Disp: , Rfl:     losartan (COZAAR) 25 mg tablet, Take 25 mg by mouth daily, Disp: , Rfl:     memantine (NAMENDA) 10 mg tablet, Take 1 tablet (10 mg total) by mouth 2 (two) times a day, Disp: 90 tablet, Rfl: 2    Multiple Vitamins-Minerals (CENTRUM SILVER PO), Take by mouth, Disp: , Rfl:     nitrofurantoin (MACROBID) 100 mg capsule, Take 1 capsule (100 mg total) by mouth 2 (two) times a day, Disp: 10 capsule, Rfl: 0    omeprazole (PriLOSEC) 20 mg delayed release capsule, Take 20 mg by mouth daily, Disp: , Rfl:     risperiDONE (RisperDAL) 0 25 mg tablet, Take 1 tablet (0 25 mg total) by mouth 2 (two) times a day, Disp: 90 tablet, Rfl: 2    sodium bicarbonate 650 mg tablet, Take 650 mg by mouth 4 (four) times a day, Disp: , Rfl:     traMADol (ULTRAM) 50 mg tablet, Take 50 mg by mouth every 6 (six) hours as needed for moderate pain, Disp: , Rfl:     traZODone (DESYREL) 50 mg tablet, Take 50 mg by mouth daily at bedtime, Disp: , Rfl:     cephalexin (KEFLEX) 250 mg capsule, Take 1 capsule (250 mg total) by mouth every 8 (eight) hours for 5 days, Disp: 15 capsule, Rfl: 0      Active Problems     Patient Active Problem List   Diagnosis    Memory loss    History of recurrent UTIs    Sleep difficulties    Late onset Alzheimer's disease with behavioral disturbance (HCC)    Chronic pain of both knees    Moderate episode of recurrent major depressive disorder (HCC)    Vitamin D deficiency    Other hyperlipidemia    Type 2 diabetes mellitus without complication, without long-term current use of insulin (HCC)    Acute cystitis without hematuria    Encounter for screening colonoscopy    Bilateral primary osteoarthritis of knee         Past Medical History     Past Medical History:   Diagnosis Date    Anxiety     Cholesterol depletion     Confusion     [facility]     Depression     [facility]     Early onset [facility]'s dementia [facility]     Glaucoma     Hypertension     Hypertension     Memory loss          Surgical History     Past Surgical History:   Procedure Laterality Date    HYSTERECTOMY           Family History     Family History   Problem Relation Age of Onset    Diabetes Mother     Hypertension Mother        Social History     Social History       Radiology

## 2020-09-24 LAB — BACTERIA UR CULT: ABNORMAL

## 2020-09-29 ENCOUNTER — HOSPITAL ENCOUNTER (OUTPATIENT)
Dept: ULTRASOUND IMAGING | Facility: HOSPITAL | Age: 75
Discharge: HOME | End: 2020-09-29
Payer: MEDICARE

## 2020-09-29 DIAGNOSIS — N30.00 ACUTE CYSTITIS WITHOUT HEMATURIA: ICD-10-CM

## 2020-09-29 PROCEDURE — 51798 US URINE CAPACITY MEASURE: CPT

## 2020-10-05 ENCOUNTER — TELEPHONE (OUTPATIENT)
Dept: PSYCHIATRY | Facility: CLINIC | Age: 75
End: 2020-10-05

## 2020-10-05 ENCOUNTER — OFFICE VISIT (OUTPATIENT)
Dept: GERIATRICS | Age: 75
End: 2020-10-05
Payer: MEDICARE

## 2020-10-05 VITALS
BODY MASS INDEX: 33.23 KG/M2 | WEIGHT: 206.8 LBS | RESPIRATION RATE: 16 BRPM | HEART RATE: 62 BPM | OXYGEN SATURATION: 98 % | TEMPERATURE: 98.9 F | DIASTOLIC BLOOD PRESSURE: 62 MMHG | SYSTOLIC BLOOD PRESSURE: 138 MMHG | HEIGHT: 66 IN

## 2020-10-05 DIAGNOSIS — F33.1 MODERATE EPISODE OF RECURRENT MAJOR DEPRESSIVE DISORDER (HCC): ICD-10-CM

## 2020-10-05 DIAGNOSIS — G30.1 LATE ONSET ALZHEIMER'S DISEASE WITH BEHAVIORAL DISTURBANCE (HCC): Primary | ICD-10-CM

## 2020-10-05 DIAGNOSIS — F02.81 LATE ONSET ALZHEIMER'S DISEASE WITH BEHAVIORAL DISTURBANCE (HCC): Primary | ICD-10-CM

## 2020-10-05 DIAGNOSIS — Z87.440 HISTORY OF RECURRENT UTIS: ICD-10-CM

## 2020-10-05 DIAGNOSIS — Z79.899 POLYPHARMACY: ICD-10-CM

## 2020-10-05 DIAGNOSIS — N18.4 CKD (CHRONIC KIDNEY DISEASE) STAGE 4, GFR 15-29 ML/MIN (HCC): ICD-10-CM

## 2020-10-05 DIAGNOSIS — M17.0 BILATERAL PRIMARY OSTEOARTHRITIS OF KNEE: ICD-10-CM

## 2020-10-05 DIAGNOSIS — E78.49 OTHER HYPERLIPIDEMIA: ICD-10-CM

## 2020-10-05 DIAGNOSIS — E11.9 TYPE 2 DIABETES MELLITUS WITHOUT COMPLICATION, WITHOUT LONG-TERM CURRENT USE OF INSULIN (HCC): ICD-10-CM

## 2020-10-05 DIAGNOSIS — R00.1 BRADYCARDIA: ICD-10-CM

## 2020-10-05 DIAGNOSIS — G47.9 SLEEP DIFFICULTIES: ICD-10-CM

## 2020-10-05 LAB — SL AMB POCT GLUCOSE BLD: 158

## 2020-10-05 PROCEDURE — 82948 REAGENT STRIP/BLOOD GLUCOSE: CPT | Performed by: STUDENT IN AN ORGANIZED HEALTH CARE EDUCATION/TRAINING PROGRAM

## 2020-10-05 PROCEDURE — 93000 ELECTROCARDIOGRAM COMPLETE: CPT | Performed by: STUDENT IN AN ORGANIZED HEALTH CARE EDUCATION/TRAINING PROGRAM

## 2020-10-05 PROCEDURE — 99215 OFFICE O/P EST HI 40 MIN: CPT | Performed by: STUDENT IN AN ORGANIZED HEALTH CARE EDUCATION/TRAINING PROGRAM

## 2020-10-05 RX ORDER — BRINZOLAMIDE 1 %
SUSPENSION, DROPS(FINAL DOSAGE FORM)(ML) OPHTHALMIC (EYE)
COMMUNITY
Start: 2020-10-01

## 2020-10-06 ENCOUNTER — TELEPHONE (OUTPATIENT)
Dept: UROLOGY | Facility: CLINIC | Age: 75
End: 2020-10-06

## 2020-10-06 PROBLEM — Z79.899 POLYPHARMACY: Status: ACTIVE | Noted: 2020-10-06

## 2020-10-18 DIAGNOSIS — N30.00 ACUTE CYSTITIS WITHOUT HEMATURIA: ICD-10-CM

## 2020-10-19 RX ORDER — CEPHALEXIN 250 MG/1
CAPSULE ORAL
Qty: 15 CAPSULE | Refills: 0 | OUTPATIENT
Start: 2020-10-19

## 2020-10-20 ENCOUNTER — PREP FOR PROCEDURE (OUTPATIENT)
Dept: GASTROENTEROLOGY | Facility: CLINIC | Age: 75
End: 2020-10-20

## 2020-10-20 DIAGNOSIS — Z12.11 SPECIAL SCREENING FOR MALIGNANT NEOPLASMS, COLON: Primary | ICD-10-CM

## 2020-10-27 ENCOUNTER — TELEPHONE (OUTPATIENT)
Dept: UROLOGY | Facility: CLINIC | Age: 75
End: 2020-10-27

## 2020-11-03 ENCOUNTER — HOSPITAL ENCOUNTER (OUTPATIENT)
Dept: NON INVASIVE DIAGNOSTICS | Facility: CLINIC | Age: 75
Discharge: HOME | End: 2020-11-03
Payer: MEDICARE

## 2020-11-03 ENCOUNTER — TELEPHONE (OUTPATIENT)
Dept: NEPHROLOGY | Facility: CLINIC | Age: 75
End: 2020-11-03

## 2020-11-03 DIAGNOSIS — R00.1 BRADYCARDIA: ICD-10-CM

## 2020-11-03 PROCEDURE — 93226 XTRNL ECG REC<48 HR SCAN A/R: CPT

## 2020-11-03 PROCEDURE — 93225 XTRNL ECG REC<48 HRS REC: CPT

## 2020-11-04 ENCOUNTER — TELEMEDICINE (OUTPATIENT)
Dept: NEPHROLOGY | Facility: CLINIC | Age: 75
End: 2020-11-04
Payer: MEDICARE

## 2020-11-04 DIAGNOSIS — I12.9 TYPE 2 DIABETES MELLITUS WITH STAGE 4 CHRONIC KIDNEY DISEASE AND HYPERTENSION (HCC): Primary | ICD-10-CM

## 2020-11-04 DIAGNOSIS — N18.4 TYPE 2 DIABETES MELLITUS WITH STAGE 4 CHRONIC KIDNEY DISEASE AND HYPERTENSION (HCC): Primary | ICD-10-CM

## 2020-11-04 DIAGNOSIS — E55.9 VITAMIN D DEFICIENCY: ICD-10-CM

## 2020-11-04 DIAGNOSIS — E11.22 TYPE 2 DIABETES MELLITUS WITH STAGE 4 CHRONIC KIDNEY DISEASE AND HYPERTENSION (HCC): Primary | ICD-10-CM

## 2020-11-04 PROCEDURE — 99204 OFFICE O/P NEW MOD 45 MIN: CPT | Performed by: INTERNAL MEDICINE

## 2020-11-17 PROCEDURE — 93227 XTRNL ECG REC<48 HR R&I: CPT | Performed by: INTERNAL MEDICINE

## 2020-11-18 ENCOUNTER — TELEMEDICINE (OUTPATIENT)
Dept: GERIATRICS | Age: 75
End: 2020-11-18
Payer: MEDICARE

## 2020-11-18 ENCOUNTER — OFFICE VISIT (OUTPATIENT)
Dept: PSYCHIATRY | Facility: CLINIC | Age: 75
End: 2020-11-18
Payer: MEDICARE

## 2020-11-18 DIAGNOSIS — G47.9 SLEEP DIFFICULTIES: ICD-10-CM

## 2020-11-18 DIAGNOSIS — G89.29 CHRONIC PAIN OF BOTH KNEES: ICD-10-CM

## 2020-11-18 DIAGNOSIS — F02.80 LATE ONSET ALZHEIMER'S DISEASE WITHOUT BEHAVIORAL DISTURBANCE (HCC): Primary | ICD-10-CM

## 2020-11-18 DIAGNOSIS — F02.81 LATE ONSET ALZHEIMER'S DISEASE WITH BEHAVIORAL DISTURBANCE (HCC): ICD-10-CM

## 2020-11-18 DIAGNOSIS — F41.9 ANXIETY: ICD-10-CM

## 2020-11-18 DIAGNOSIS — R26.81 GAIT INSTABILITY: ICD-10-CM

## 2020-11-18 DIAGNOSIS — F33.1 MODERATE EPISODE OF RECURRENT MAJOR DEPRESSIVE DISORDER (HCC): ICD-10-CM

## 2020-11-18 DIAGNOSIS — G30.1 LATE ONSET ALZHEIMER'S DISEASE WITH BEHAVIORAL DISTURBANCE (HCC): ICD-10-CM

## 2020-11-18 DIAGNOSIS — M25.562 CHRONIC PAIN OF BOTH KNEES: ICD-10-CM

## 2020-11-18 DIAGNOSIS — I12.9 TYPE 2 DIABETES MELLITUS WITH STAGE 4 CHRONIC KIDNEY DISEASE AND HYPERTENSION (HCC): ICD-10-CM

## 2020-11-18 DIAGNOSIS — M25.561 CHRONIC PAIN OF BOTH KNEES: ICD-10-CM

## 2020-11-18 DIAGNOSIS — E11.22 TYPE 2 DIABETES MELLITUS WITH STAGE 4 CHRONIC KIDNEY DISEASE AND HYPERTENSION (HCC): ICD-10-CM

## 2020-11-18 DIAGNOSIS — G30.1 LATE ONSET ALZHEIMER'S DISEASE WITHOUT BEHAVIORAL DISTURBANCE (HCC): Primary | ICD-10-CM

## 2020-11-18 DIAGNOSIS — N18.4 TYPE 2 DIABETES MELLITUS WITH STAGE 4 CHRONIC KIDNEY DISEASE AND HYPERTENSION (HCC): ICD-10-CM

## 2020-11-18 PROCEDURE — 99215 OFFICE O/P EST HI 40 MIN: CPT | Performed by: STUDENT IN AN ORGANIZED HEALTH CARE EDUCATION/TRAINING PROGRAM

## 2020-11-18 PROCEDURE — 90792 PSYCH DIAG EVAL W/MED SRVCS: CPT | Performed by: PSYCHIATRY & NEUROLOGY

## 2020-11-18 RX ORDER — TRAZODONE HYDROCHLORIDE 50 MG/1
50 TABLET ORAL
Qty: 90 TABLET | Refills: 1 | Status: ON HOLD | OUTPATIENT
Start: 2020-11-18 | End: 2021-06-20

## 2020-11-18 RX ORDER — CITALOPRAM 40 MG/1
40 TABLET ORAL DAILY
Qty: 90 TABLET | Refills: 1 | Status: SHIPPED | OUTPATIENT
Start: 2020-11-18 | End: 2021-07-06 | Stop reason: SDUPTHER

## 2020-11-18 RX ORDER — BUPROPION HYDROCHLORIDE 150 MG/1
150 TABLET, EXTENDED RELEASE ORAL 2 TIMES DAILY
Qty: 180 TABLET | Refills: 1 | Status: SHIPPED | OUTPATIENT
Start: 2020-11-18 | End: 2021-07-06 | Stop reason: SDUPTHER

## 2020-11-18 RX ORDER — MEMANTINE HYDROCHLORIDE 10 MG/1
10 TABLET ORAL 2 TIMES DAILY
Qty: 90 TABLET | Refills: 1 | Status: SHIPPED | OUTPATIENT
Start: 2020-11-18 | End: 2021-03-08

## 2020-11-18 RX ORDER — DONEPEZIL HYDROCHLORIDE 10 MG/1
10 TABLET, FILM COATED ORAL
Qty: 90 TABLET | Refills: 1 | Status: SHIPPED | OUTPATIENT
Start: 2020-11-18 | End: 2021-07-06 | Stop reason: SDUPTHER

## 2020-11-18 RX ORDER — RISPERIDONE 0.25 MG/1
0.25 TABLET, FILM COATED ORAL 2 TIMES DAILY
Qty: 90 TABLET | Refills: 1 | Status: SHIPPED | OUTPATIENT
Start: 2020-11-18 | End: 2021-04-05

## 2020-11-19 ENCOUNTER — TELEPHONE (OUTPATIENT)
Dept: GERIATRICS | Age: 75
End: 2020-11-19

## 2020-12-22 ENCOUNTER — TELEPHONE (OUTPATIENT)
Dept: GERIATRICS | Age: 75
End: 2020-12-22

## 2020-12-24 ENCOUNTER — TELEMEDICINE (OUTPATIENT)
Dept: GERIATRICS | Age: 75
End: 2020-12-24
Payer: MEDICARE

## 2020-12-24 DIAGNOSIS — N18.4 TYPE 2 DIABETES MELLITUS WITH STAGE 4 CHRONIC KIDNEY DISEASE AND HYPERTENSION (HCC): ICD-10-CM

## 2020-12-24 DIAGNOSIS — I10 ESSENTIAL HYPERTENSION: ICD-10-CM

## 2020-12-24 DIAGNOSIS — E11.22 TYPE 2 DIABETES MELLITUS WITH STAGE 4 CHRONIC KIDNEY DISEASE AND HYPERTENSION (HCC): ICD-10-CM

## 2020-12-24 DIAGNOSIS — R42 VERTIGO: Primary | ICD-10-CM

## 2020-12-24 DIAGNOSIS — I12.9 TYPE 2 DIABETES MELLITUS WITH STAGE 4 CHRONIC KIDNEY DISEASE AND HYPERTENSION (HCC): ICD-10-CM

## 2020-12-24 DIAGNOSIS — F02.80 LATE ONSET ALZHEIMER'S DISEASE WITHOUT BEHAVIORAL DISTURBANCE (HCC): ICD-10-CM

## 2020-12-24 DIAGNOSIS — G30.1 LATE ONSET ALZHEIMER'S DISEASE WITHOUT BEHAVIORAL DISTURBANCE (HCC): ICD-10-CM

## 2020-12-24 PROCEDURE — 99214 OFFICE O/P EST MOD 30 MIN: CPT | Performed by: STUDENT IN AN ORGANIZED HEALTH CARE EDUCATION/TRAINING PROGRAM

## 2020-12-24 RX ORDER — MECLIZINE HYDROCHLORIDE 25 MG/1
25 TABLET ORAL 2 TIMES DAILY
Qty: 30 TABLET | Refills: 0 | Status: SHIPPED | OUTPATIENT
Start: 2020-12-24 | End: 2021-07-06 | Stop reason: SDUPTHER

## 2020-12-24 RX ORDER — HYDRALAZINE HYDROCHLORIDE 25 MG/1
25 TABLET, FILM COATED ORAL 3 TIMES DAILY
Qty: 90 TABLET | Refills: 1 | Status: SHIPPED | OUTPATIENT
Start: 2020-12-24 | End: 2021-01-20

## 2020-12-29 DIAGNOSIS — E11.9 TYPE 2 DIABETES MELLITUS WITHOUT COMPLICATION, WITHOUT LONG-TERM CURRENT USE OF INSULIN (HCC): ICD-10-CM

## 2020-12-29 DIAGNOSIS — I10 ESSENTIAL HYPERTENSION: Primary | ICD-10-CM

## 2020-12-29 RX ORDER — LINAGLIPTIN 5 MG/1
5 TABLET, FILM COATED ORAL DAILY
Qty: 90 TABLET | Refills: 0 | Status: SHIPPED | OUTPATIENT
Start: 2020-12-29 | End: 2021-04-01

## 2020-12-29 RX ORDER — AMLODIPINE BESYLATE 10 MG/1
10 TABLET ORAL DAILY
Qty: 90 TABLET | Refills: 0 | Status: SHIPPED | OUTPATIENT
Start: 2020-12-29 | End: 2021-03-24

## 2020-12-31 DIAGNOSIS — E78.49 OTHER HYPERLIPIDEMIA: Primary | ICD-10-CM

## 2020-12-31 RX ORDER — ATORVASTATIN CALCIUM 40 MG/1
40 TABLET, FILM COATED ORAL DAILY
Qty: 90 TABLET | Refills: 1 | Status: SHIPPED | OUTPATIENT
Start: 2020-12-31 | End: 2021-06-25

## 2021-01-04 ENCOUNTER — OFFICE VISIT (OUTPATIENT)
Dept: OBGYN CLINIC | Facility: CLINIC | Age: 76
End: 2021-01-04
Payer: MEDICARE

## 2021-01-04 VITALS
HEIGHT: 66 IN | SYSTOLIC BLOOD PRESSURE: 149 MMHG | WEIGHT: 206 LBS | HEART RATE: 79 BPM | DIASTOLIC BLOOD PRESSURE: 76 MMHG | BODY MASS INDEX: 33.11 KG/M2

## 2021-01-04 DIAGNOSIS — M17.0 BILATERAL PRIMARY OSTEOARTHRITIS OF KNEE: Primary | ICD-10-CM

## 2021-01-04 PROCEDURE — 99214 OFFICE O/P EST MOD 30 MIN: CPT | Performed by: PHYSICAL MEDICINE & REHABILITATION

## 2021-01-04 PROCEDURE — 20610 DRAIN/INJ JOINT/BURSA W/O US: CPT | Performed by: PHYSICAL MEDICINE & REHABILITATION

## 2021-01-04 RX ORDER — TRIAMCINOLONE ACETONIDE 40 MG/ML
40 INJECTION, SUSPENSION INTRA-ARTICULAR; INTRAMUSCULAR
Status: COMPLETED | OUTPATIENT
Start: 2021-01-04 | End: 2021-01-04

## 2021-01-04 RX ORDER — LIDOCAINE HYDROCHLORIDE 10 MG/ML
3 INJECTION, SOLUTION INFILTRATION; PERINEURAL
Status: COMPLETED | OUTPATIENT
Start: 2021-01-04 | End: 2021-01-04

## 2021-01-04 RX ADMIN — LIDOCAINE HYDROCHLORIDE 3 ML: 10 INJECTION, SOLUTION INFILTRATION; PERINEURAL at 14:14

## 2021-01-04 RX ADMIN — TRIAMCINOLONE ACETONIDE 40 MG: 40 INJECTION, SUSPENSION INTRA-ARTICULAR; INTRAMUSCULAR at 14:14

## 2021-01-04 NOTE — PROGRESS NOTES
1  Bilateral primary osteoarthritis of knee  Large joint arthrocentesis: R knee    Large joint arthrocentesis: L knee    Injection procedure prior authorization     Orders Placed This Encounter   Procedures    Large joint arthrocentesis: R knee    Large joint arthrocentesis: L knee    Injection procedure prior authorization        Imaging Studies (I personally reviewed images in PACS and report):  Bilateral knee x-rays most recent to this encounter reviewed   These images show bilateral patellofemoral arthropathy with decreased joint space in this region along with osteophytosis   Both medial and lateral joint spaces are narrowed with osteophytosis   These findings are consistent with Adelita Panda grade 4 osteoarthritis   No acute osseous abnormalities      Impression:  Bilateral knee pain likely secondary to osteoarthritis as above   The patient had viscosupplementation that she just finished this past June in [address] [place] [address] discussed different treatment options and decided to proceed with steroid injections into both of her knees as she has had relief for about 3 months with each time we have done the injections  She continues to have tenderness along the medial joint line but is able to continue her activities of daily living  She has been using a medial  for the left knee and we provided her with a hinged knee brace for the right knee  I had also prescribed her Voltaren gel that she can use as needed  I will see her back in 4 weeks to start the viscosupplementation series  This will allow some time for the injectate we put in there today to settle/diffuse  Return in about 4 weeks (around 2/1/2021) for Return to clinic for viscosupplementation series  HPI:  Ratna Reece is a 77 y o  female  who presents in follow up  Here for   Chief Complaint   Patient presents with    Follow-up       Date of injury:  Chronic  Trajectory of symptoms:  Last visit with me was back in September 2020  She did well after the steroid injections at this time  She is requesting the same today  The patient is accompanied by her daughter who provides most of the information  Review of Systems   Constitutional: Positive for activity change  Negative for fever  HENT: Negative for sore throat  Eyes: Negative for visual disturbance  Respiratory: Negative for shortness of breath  Cardiovascular: Negative for chest pain  Gastrointestinal: Negative for abdominal pain  Endocrine: Negative for polydipsia  Genitourinary: Negative for difficulty urinating  Musculoskeletal: Positive for arthralgias  Skin: Negative for rash  Allergic/Immunologic: Negative for immunocompromised state  Neurological: Negative for numbness  Hematological: Does not bruise/bleed easily  Psychiatric/Behavioral: Negative for confusion  Following history reviewed and updated:  Past Medical History:   Diagnosis Date    Anxiety     Cholesterol depletion     Confusion     [facility]     Depression     [facility]     [facility]     Glaucoma     Hypertension     Hypertension     Memory loss      Past Surgical History:   Procedure Laterality Date    HYSTERECTOMY       Social History   Social History     Substance and Sexual Activity   Alcohol Use Not Currently    Frequency: Never     Social History     Substance and Sexual Activity   Drug Use Never     Social History     Tobacco Use   Smoking Status Former Smoker    Types: Cigarettes    Quit date: 2018    Years since quittin 4   Smokeless Tobacco Never Used     Family History   Problem Relation Age of Onset    Diabetes Mother     Hypertension Mother      Allergies   Allergen Reactions    Codeine         Constitutional:  /76   Pulse 79   Ht 5' 6" (1 676 m)   Wt 93 4 kg (206 lb)   BMI 33 25 kg/m²    General: NAD  Eyes: Clear sclerae  ENT: No inflammation, lesion, or mass of lips    No tracheal deviation  Musculoskeletal: As mentioned below  Integumentary: No visible rashes or skin lesions  Pulmonary/Chest: Effort normal  No respiratory distress  Neuro: CN's grossly intact, CLANCY  Psych: Normal affect and judgement  Vascular: WWP  Right Knee Exam     Tenderness   The patient is experiencing tenderness in the medial joint line and lateral joint line  Range of Motion   Extension: normal   Flexion:  100 abnormal     Other   Erythema: absent  Scars: absent  Sensation: normal  Pulse: present    Comments:  Injection site was CDI  Left Knee Exam     Tenderness   The patient is experiencing tenderness in the lateral joint line and medial joint line  Range of Motion   Extension: normal   Flexion:  110 abnormal     Other   Erythema: absent  Scars: absent  Sensation: normal  Pulse: present    Comments:  Injection site was CDI  Large joint arthrocentesis: R knee  Universal Protocol:  Consent given by: patient  Timeout called at: 1/4/2021 2:08 PM   Site marked: the operative site was marked  Supporting Documentation  Indications: pain   Procedure Details  Location: knee - R knee  Needle gauge: 21G 2''  Ultrasound guidance: no  Approach: Inferolateral to patella  Medications administered: 40 mg triamcinolone acetonide 40 mg/mL; 3 mL lidocaine 1 %    Patient tolerance: patient tolerated the procedure well with no immediate complications  Dressing:  Sterile dressing applied    A 2 inch needle was used and I was able to hub the entire needle  This makes it fairly certain that I am within the intercondylar notch/joint space  There was little to no resistance encountered during the injection  Prior to the procedure, the patient was informed of the following risks in layman terms:    - Risk of bleeding since a needle is involved  - Risk of infection (1/10,000 chance as per recent studies)    Signs/symptoms were discussed and they would prompt an urgent evaluation at an emergency department   - Risk of pigmentation or skin dimpling in the skin (2-3% chance as per recent studies) from the steroid  - Risk of increased pain from steroid flare (1% chance as per recent studies) that typically lasts 24-48 hours  - Risk of increased blood sugars from the steroid medication that can last for a few weeks  If the patient is a diabetic or pre-diabetic, they were encouraged to closely monitor their blood sugars and discuss with PCP if elevated more than usual or if having symptoms  After going over these risks, we decided that the benefits outweigh the risks and proceeded with the procedure  Large joint arthrocentesis: L knee  Universal Protocol:  Consent given by: patient  Timeout called at: 1/4/2021 2:08 PM   Site marked: the operative site was marked  Supporting Documentation  Indications: pain   Procedure Details  Location: knee - L knee  Needle gauge: 21G 2''  Ultrasound guidance: no  Approach: Inferolateral to patella  Medications administered: 40 mg triamcinolone acetonide 40 mg/mL; 3 mL lidocaine 1 %    Patient tolerance: patient tolerated the procedure well with no immediate complications  Dressing:  Sterile dressing applied    A 2 inch needle was used and I was able to hub the entire needle  This makes it fairly certain that I am within the intercondylar notch/joint space  There was little to no resistance encountered during the injection  Prior to the procedure, the patient was informed of the following risks in layman terms:    - Risk of bleeding since a needle is involved  - Risk of infection (1/10,000 chance as per recent studies)  Signs/symptoms were discussed and they would prompt an urgent evaluation at an emergency department   - Risk of pigmentation or skin dimpling in the skin (2-3% chance as per recent studies) from the steroid  - Risk of increased pain from steroid flare (1% chance as per recent studies) that typically lasts 24-48 hours    - Risk of increased blood sugars from the steroid medication that can last for a few weeks  If the patient is a diabetic or pre-diabetic, they were encouraged to closely monitor their blood sugars and discuss with PCP if elevated more than usual or if having symptoms  After going over these risks, we decided that the benefits outweigh the risks and proceeded with the procedure

## 2021-01-14 ENCOUNTER — TELEPHONE (OUTPATIENT)
Dept: GASTROENTEROLOGY | Facility: CLINIC | Age: 76
End: 2021-01-14

## 2021-01-14 NOTE — TELEPHONE ENCOUNTER
Colon on 1/28/21 with Dr Verona Aguilar at [facility]  Miralax/dulcolax prep instructions mailed & emailed to address on file

## 2021-01-20 DIAGNOSIS — I10 ESSENTIAL HYPERTENSION: ICD-10-CM

## 2021-01-20 RX ORDER — HYDRALAZINE HYDROCHLORIDE 25 MG/1
TABLET, FILM COATED ORAL
Qty: 90 TABLET | Refills: 1 | Status: SHIPPED | OUTPATIENT
Start: 2021-01-20 | End: 2021-04-02

## 2021-01-27 NOTE — TELEPHONE ENCOUNTER
Miralax/dulcolax prep instructions emailed to patients daughter at [email or web address]

## 2021-01-28 ENCOUNTER — HOSPITAL ENCOUNTER (OUTPATIENT)
Dept: GASTROENTEROLOGY | Facility: AMBULARY SURGERY CENTER | Age: 76
Setting detail: OUTPATIENT SURGERY
Discharge: HOME | End: 2021-01-28
Attending: INTERNAL MEDICINE

## 2021-01-29 ENCOUNTER — TELEPHONE (OUTPATIENT)
Dept: OBGYN CLINIC | Facility: CLINIC | Age: 76
End: 2021-01-29

## 2021-01-29 NOTE — TELEPHONE ENCOUNTER
Called and [facility] for patient to return our call for rescheduling of appointment on 2/15 with Dr Ann Hilliard

## 2021-02-08 ENCOUNTER — OFFICE VISIT (OUTPATIENT)
Dept: OBGYN CLINIC | Facility: CLINIC | Age: 76
End: 2021-02-08
Payer: MEDICARE

## 2021-02-08 VITALS
SYSTOLIC BLOOD PRESSURE: 129 MMHG | BODY MASS INDEX: 33.11 KG/M2 | DIASTOLIC BLOOD PRESSURE: 82 MMHG | WEIGHT: 206 LBS | HEART RATE: 82 BPM | HEIGHT: 66 IN

## 2021-02-08 DIAGNOSIS — M17.0 BILATERAL PRIMARY OSTEOARTHRITIS OF KNEE: Primary | ICD-10-CM

## 2021-02-08 PROCEDURE — 20610 DRAIN/INJ JOINT/BURSA W/O US: CPT | Performed by: PHYSICAL MEDICINE & REHABILITATION

## 2021-02-08 RX ORDER — HYALURONATE SODIUM 10 MG/ML
20 SYRINGE (ML) INTRAARTICULAR
Status: COMPLETED | OUTPATIENT
Start: 2021-02-08 | End: 2021-02-08

## 2021-02-08 RX ADMIN — Medication 20 MG: at 14:09

## 2021-02-08 NOTE — PROGRESS NOTES
1  Bilateral primary osteoarthritis of knee  Large joint arthrocentesis: R knee    Large joint arthrocentesis: L knee     Orders Placed This Encounter   Procedures    Large joint arthrocentesis: R knee    Large joint arthrocentesis: L knee        Imaging Studies (I personally reviewed images in PACS and report):  Bilateral knee x-rays most recent to this encounter reviewed   These images show bilateral patellofemoral arthropathy with decreased joint space in this region along with osteophytosis   Both medial and lateral joint spaces are narrowed with osteophytosis   These findings are consistent with Jericho Solo grade 4 osteoarthritis   No acute osseous abnormalities      Impression:  Bilateral knee pain likely secondary to osteoarthritis as above   The patient had viscosupplementation that she just finished this past June in [address] [place] [address] discussed different treatment options and decided to proceed with steroid injections into both of her knees as she has had relief for about 3 months with each time we have done the injections   She continues to have tenderness along the medial joint line but is able to continue her activities of daily living  Jazmin Russell has been using a medial  for the left knee and we provided her with a hinged knee brace for the right knee    I had also prescribed her Voltaren gel that she can use as needed  I will see her back in 4 weeks to start the viscosupplementation series  This will allow some time for the injectate we put in there today to settle/diffuse  Return in about 1 week (around 2/15/2021)  HPI:  Andi Hancock is a 77 y o  female  who presents in follow up  Here for   Chief Complaint   Patient presents with    Left Knee - Pain    Right Knee - Pain       Date of injury: Chronic  Trajectory of symptoms: No changes  Knees feel better maybe  Review of Systems   Constitutional: Positive for activity change  Negative for fever  HENT: Negative for sore throat  Eyes: Negative for visual disturbance  Respiratory: Negative for shortness of breath  Cardiovascular: Negative for chest pain  Gastrointestinal: Negative for abdominal pain  Endocrine: Negative for polydipsia  Genitourinary: Negative for difficulty urinating  Musculoskeletal: Positive for arthralgias  Skin: Negative for rash  Allergic/Immunologic: Negative for immunocompromised state  Neurological: Negative for numbness  Hematological: Does not bruise/bleed easily  Psychiatric/Behavioral: Negative for confusion  Following history reviewed and updated:  Past Medical History:   Diagnosis Date    Anxiety     Cholesterol depletion     Confusion     [facility] ([facility] )     Depression     Diabetes ([facility] )     Early onset Alzheimer's dementia ([place] [address] )     Glaucoma     Hypertension     Hypertension     Memory loss      Past Surgical History:   Procedure Laterality Date    HYSTERECTOMY       Social History   Social History     Substance and Sexual Activity   Alcohol Use Not Currently    Frequency: Never     Social History     Substance and Sexual Activity   Drug Use Never     Social History     Tobacco Use   Smoking Status Former Smoker    Types: Cigarettes    Quit date: 2018    Years since quittin 5   Smokeless Tobacco Never Used     Family History   Problem Relation Age of Onset    Diabetes Mother     Hypertension Mother      Allergies   Allergen Reactions    Codeine         Constitutional:  /82   Pulse 82   Ht 5' 6" (1 676 m)   Wt 93 4 kg (206 lb)   BMI 33 25 kg/m²    General: NAD  Eyes: Clear sclerae  ENT: No inflammation, lesion, or mass of lips  No tracheal deviation  Musculoskeletal: As mentioned below  Integumentary: No visible rashes or skin lesions  Pulmonary/Chest: Effort normal  No respiratory distress  Neuro: CN's grossly intact, CLANCY  Psych: Normal affect and judgement  Vascular: WWP      Right Knee Exam     Comments:  Injection site is CDI       Left Knee Exam     Comments:  Injection site is CDI  Large joint arthrocentesis: R knee  Universal Protocol:  Consent given by: patient  Timeout called at: 2/8/2021 2:08 PM   Site marked: the operative site was marked  Supporting Documentation  Indications: pain   Procedure Details  Location: knee - R knee  Needle gauge: 21G 2''  Ultrasound guidance: no  Approach: Inferolateral to patella  Medications administered: 20 mg Sodium Hyaluronate 20 MG/2ML    Patient tolerance: patient tolerated the procedure well with no immediate complications  Dressing:  Sterile dressing applied    A 2 inch needle was used and I was able to hub the entire needle  This makes it fairly certain that I am within the intercondylar notch/joint space  There was no resistance encountered during the actual injection  Prior to the procedure, the patient was informed of the following risks in layman terms:    - Risk of bleeding since a needle is involved  - Risk of infection (1/10,000 chance as per recent studies)  Signs/symptoms were discussed and they would prompt an urgent evaluation at an emergency department   - Risk of synovitis from the viscosupplementation  After going over these risks, we decided that the benefits outweigh the risks and proceeded with the procedure  Large joint arthrocentesis: L knee  Universal Protocol:  Consent given by: patient  Timeout called at: 2/8/2021 2:09 PM   Site marked: the operative site was marked  Supporting Documentation  Indications: pain   Procedure Details  Location: knee - L knee  Needle gauge: 21G 2''  Ultrasound guidance: no  Approach: Inferolateral to patella  Medications administered: 20 mg Sodium Hyaluronate 20 MG/2ML    Patient tolerance: patient tolerated the procedure well with no immediate complications  Dressing:  Sterile dressing applied    A 2 inch needle was used and I was able to hub the entire needle    This makes it fairly certain that I am within the intercondylar notch/joint space  There was no resistance encountered during the actual injection  Prior to the procedure, the patient was informed of the following risks in layman terms:    - Risk of bleeding since a needle is involved  - Risk of infection (1/10,000 chance as per recent studies)  Signs/symptoms were discussed and they would prompt an urgent evaluation at an emergency department   - Risk of synovitis from the viscosupplementation  After going over these risks, we decided that the benefits outweigh the risks and proceeded with the procedure

## 2021-02-08 NOTE — PATIENT INSTRUCTIONS
You had an injection of hyaluronic acid today  We will see you back in 1 week for the next injection in the series  Call if you experience redness or drainage at the injection sites or a substantial increase in pain

## 2021-02-13 DIAGNOSIS — Z23 ENCOUNTER FOR IMMUNIZATION: ICD-10-CM

## 2021-02-15 ENCOUNTER — OFFICE VISIT (OUTPATIENT)
Dept: OBGYN CLINIC | Facility: CLINIC | Age: 76
End: 2021-02-15
Payer: MEDICARE

## 2021-02-15 VITALS
HEIGHT: 66 IN | WEIGHT: 206 LBS | SYSTOLIC BLOOD PRESSURE: 174 MMHG | DIASTOLIC BLOOD PRESSURE: 131 MMHG | HEART RATE: 112 BPM | BODY MASS INDEX: 33.11 KG/M2

## 2021-02-15 DIAGNOSIS — M17.0 BILATERAL PRIMARY OSTEOARTHRITIS OF KNEE: Primary | ICD-10-CM

## 2021-02-15 PROCEDURE — 20610 DRAIN/INJ JOINT/BURSA W/O US: CPT | Performed by: PHYSICAL MEDICINE & REHABILITATION

## 2021-02-15 RX ORDER — HYALURONATE SODIUM 10 MG/ML
20 SYRINGE (ML) INTRAARTICULAR
Status: COMPLETED | OUTPATIENT
Start: 2021-02-15 | End: 2021-02-15

## 2021-02-15 RX ADMIN — Medication 20 MG: at 13:15

## 2021-02-15 NOTE — PROGRESS NOTES
1  Bilateral primary osteoarthritis of knee  Large joint arthrocentesis: R knee    Large joint arthrocentesis: L knee     Orders Placed This Encounter   Procedures    Large joint arthrocentesis: R knee    Large joint arthrocentesis: L knee      Impression:  Bilateral knee pain likely secondary to osteoarthritis as above   The patient had viscosupplementation that she just finished this past June 2020 in [address] [place] [address] discussed different treatment options and decided to proceed with steroid injections into both of her knees as she has had relief for about 3 months with each time we have done the injections   She continues to have tenderness along the medial joint line but is able to continue her activities of daily living  José Ahumada has been using a medial  for the left knee and we provided her with a hinged knee brace for the right knee    I had also prescribed her Voltaren gel that she can use as needed  Patient presents for planned viscosupplementation injection (Euflexxa) 2/3 in to the bilateral knees  Please see procedure note below  Return next week for third and final     Return in about 1 week (around 2/22/2021)  HPI:  Cherelle Griffin is a 77 y o  female  who presents in follow up  Here for   Chief Complaint   Patient presents with    Left Knee - Follow-up, Pain    Right Knee - Follow-up, Pain       Date of injury: Chronic  Trajectory of symptoms: Patient has dementia  She has not complained of much  Review of Systems   Constitutional: Positive for activity change  Negative for fever  HENT: Negative for sore throat  Eyes: Negative for visual disturbance  Respiratory: Negative for shortness of breath  Cardiovascular: Negative for chest pain  Gastrointestinal: Negative for abdominal pain  Endocrine: Negative for polydipsia  Genitourinary: Negative for difficulty urinating  Musculoskeletal: Positive for arthralgias  Skin: Negative for rash     Allergic/Immunologic: Negative for immunocompromised state  Neurological: Negative for numbness  Hematological: Does not bruise/bleed easily  Psychiatric/Behavioral: Negative for confusion  Following history reviewed and updated:  Past Medical History:   Diagnosis Date    Anxiety     Cholesterol depletion     Confusion     [facility] )     Depression     Diabetes ([facility] )     Early onset Alzheimer's dementia ([facility] )     Glaucoma     Hypertension     Hypertension     Memory loss      Past Surgical History:   Procedure Laterality Date    HYSTERECTOMY       Social History   Social History     Substance and Sexual Activity   Alcohol Use Not Currently    Frequency: Never     Social History     Substance and Sexual Activity   Drug Use Never     Social History     Tobacco Use   Smoking Status Former Smoker    Types: Cigarettes    Quit date: 2018    Years since quittin 5   Smokeless Tobacco Never Used     Family History   Problem Relation Age of Onset    Diabetes Mother     Hypertension Mother      Allergies   Allergen Reactions    Codeine         Constitutional:  BP (!) 174/131   Pulse (!) 112   Ht 5' 6" (1 676 m)   Wt 93 4 kg (206 lb)   BMI 33 25 kg/m²    General: NAD  Eyes: Clear sclerae  ENT: No inflammation, lesion, or mass of lips  No tracheal deviation  Musculoskeletal: As mentioned below  Integumentary: No visible rashes or skin lesions  Pulmonary/Chest: Effort normal  No respiratory distress  Neuro: CN's grossly intact, CLANCY  Psych: Normal affect and judgement  Vascular: WWP  Right Knee Exam     Comments:  Injection site is CDI  Left Knee Exam     Comments:  Injection site is CDI               Large joint arthrocentesis: R knee  Universal Protocol:  Consent given by: patient  Timeout called at: 2/15/2021 1:15 PM   Site marked: the operative site was marked  Supporting Documentation  Indications: pain   Procedure Details  Location: knee - R knee  Needle gauge: 21G 2''  Ultrasound guidance: no  Approach: Inferolateral to patella  Medications administered: 20 mg Sodium Hyaluronate 20 MG/2ML    Patient tolerance: patient tolerated the procedure well with no immediate complications  Dressing:  Sterile dressing applied    A 2 inch needle was used and I was able to hub the entire needle  This makes it fairly certain that I am within the intercondylar notch/joint space  There was no resistance encountered during the actual injection  Prior to the procedure, the patient was informed of the following risks in layman terms:    - Risk of bleeding since a needle is involved  - Risk of infection (1/10,000 chance as per recent studies)  Signs/symptoms were discussed and they would prompt an urgent evaluation at an emergency department   - Risk of synovitis from the viscosupplementation  After going over these risks, we decided that the benefits outweigh the risks and proceeded with the procedure  Large joint arthrocentesis: L knee  Universal Protocol:  Consent given by: patient  Timeout called at: 2/15/2021 1:15 PM   Site marked: the operative site was marked  Supporting Documentation  Indications: pain   Procedure Details  Location: knee - L knee  Needle gauge: 21G 2''  Ultrasound guidance: no  Approach: Inferolateral to patella  Medications administered: 20 mg Sodium Hyaluronate 20 MG/2ML    Patient tolerance: patient tolerated the procedure well with no immediate complications  Dressing:  Sterile dressing applied    A 2 inch needle was used and I was able to hub the entire needle  This makes it fairly certain that I am within the intercondylar notch/joint space  There was no resistance encountered during the actual injection  Prior to the procedure, the patient was informed of the following risks in layman terms:    - Risk of bleeding since a needle is involved  - Risk of infection (1/10,000 chance as per recent studies)    Signs/symptoms were discussed and they would prompt an urgent evaluation at an emergency department   - Risk of synovitis from the viscosupplementation  After going over these risks, we decided that the benefits outweigh the risks and proceeded with the procedure  Imaging Studies (I personally reviewed images in PACS and report):  Bilateral knee x-rays most recent to this encounter reviewed   These images show bilateral patellofemoral arthropathy with decreased joint space in this region along with osteophytosis   Both medial and lateral joint spaces are narrowed with osteophytosis   These findings are consistent with [place]olette Knoll grade 4 osteoarthritis   No acute osseous abnormalities

## 2021-02-22 ENCOUNTER — TELEPHONE (OUTPATIENT)
Dept: OBGYN CLINIC | Facility: HOSPITAL | Age: 76
End: 2021-02-22

## 2021-02-22 ENCOUNTER — TELEPHONE (OUTPATIENT)
Dept: OBGYN CLINIC | Facility: CLINIC | Age: 76
End: 2021-02-22

## 2021-02-22 NOTE — TELEPHONE ENCOUNTER
LVM for patient to call back for B/L injections she can be put on at 10:15 or 2:15 same day slot for this appoint due to insurance and covering injections

## 2021-02-26 ENCOUNTER — OFFICE VISIT (OUTPATIENT)
Dept: OBGYN CLINIC | Facility: CLINIC | Age: 76
End: 2021-02-26
Payer: MEDICARE

## 2021-02-26 DIAGNOSIS — M17.0 BILATERAL PRIMARY OSTEOARTHRITIS OF KNEE: Primary | ICD-10-CM

## 2021-02-26 PROCEDURE — 20610 DRAIN/INJ JOINT/BURSA W/O US: CPT | Performed by: PHYSICAL MEDICINE & REHABILITATION

## 2021-02-26 RX ORDER — HYALURONATE SODIUM 10 MG/ML
20 SYRINGE (ML) INTRAARTICULAR
Status: COMPLETED | OUTPATIENT
Start: 2021-02-26 | End: 2021-02-26

## 2021-02-26 RX ADMIN — Medication 20 MG: at 14:36

## 2021-02-26 NOTE — PATIENT INSTRUCTIONS
You had an injection of hyaluronic acid today  Call if you experience redness or drainage at the injection sites or a substantial increase in pain

## 2021-02-26 NOTE — PROGRESS NOTES
1  Bilateral primary osteoarthritis of knee       Orders Placed This Encounter   Procedures    Large joint arthrocentesis    Large joint arthrocentesis        Impression:  Bilateral knee pain likely secondary to osteoarthritis as above   The patient had viscosupplementation that she just finished this past June 2020 in [address] [place] [address] discussed different treatment options and decided to proceed with steroid injections into both of her knees as she has had relief for about 3 months with each time we have done the injections   She continues to have tenderness along the medial joint line but is able to continue her activities of daily living  Jazmin Russell has been using a medial  for the left knee and we provided her with a hinged knee brace for the right knee    I had also prescribed her Voltaren gel that she can use as needed        Patient presents for planned viscosupplementation injection (Euflexxa) 3/3 in to the bilateral knees  Please see procedure note below  Return to clinic as needed  Return if symptoms worsen or fail to improve  HPI:  Andi Hancock is a 77 y o  female  who presents in follow up  Here for   Chief Complaint   Patient presents with    Injections     euflexxa 3       Date of injury:   Chronic  Trajectory of symptoms:  Feels better but still has pain here and there  Review of Systems   Constitutional: Positive for activity change  Negative for fever  HENT: Negative for sore throat  Eyes: Negative for visual disturbance  Respiratory: Negative for shortness of breath  Cardiovascular: Negative for chest pain  Gastrointestinal: Negative for abdominal pain  Endocrine: Negative for polydipsia  Genitourinary: Negative for difficulty urinating  Musculoskeletal: Positive for arthralgias  Skin: Negative for rash  Allergic/Immunologic: Negative for immunocompromised state  Neurological: Negative for numbness  Hematological: Does not bruise/bleed easily  Psychiatric/Behavioral: Negative for confusion  Following history reviewed and updated:  Past Medical History:   Diagnosis Date    Anxiety     Cholesterol depletion     Confusion     [facility]     Depression     [facility] ([facility] )     Early onset Alzheimer's dementia ([facility] )     Glaucoma     Hypertension     Hypertension     Memory loss      Past Surgical History:   Procedure Laterality Date    HYSTERECTOMY       Social History   Social History     Substance and Sexual Activity   Alcohol Use Not Currently    Frequency: Never     Social History     Substance and Sexual Activity   Drug Use Never     Social History     Tobacco Use   Smoking Status Former Smoker    Types: Cigarettes    Quit date: 2018    Years since quittin 5   Smokeless Tobacco Never Used     Family History   Problem Relation Age of Onset    Diabetes Mother     Hypertension Mother      Allergies   Allergen Reactions    Codeine         Constitutional:  There were no vitals taken for this visit  General: NAD  Eyes: Clear sclerae  ENT: No inflammation, lesion, or mass of lips  No tracheal deviation  Musculoskeletal: As mentioned below  Integumentary: No visible rashes or skin lesions  Pulmonary/Chest: Effort normal  No respiratory distress  Neuro: CN's grossly intact, CLANCY  Psych: Normal affect and judgement  Vascular: WWP  Right Knee Exam     Comments:  Injection site is CDI  Left Knee Exam     Comments:  Injection site is CDI  Large joint arthrocentesis: R knee  Universal Protocol:  Consent given by: patient  Timeout called at: 2021 2:35 PM   Site marked: the operative site was marked  Supporting Documentation  Indications: pain   Procedure Details  Location: knee - R knee  Needle gauge: 21G 2''  Ultrasound guidance: no  Approach: Inferolateral to patella    Medications administered: 20 mg Sodium Hyaluronate 20 MG/2ML    Patient tolerance: patient tolerated the procedure well with no immediate complications  Dressing:  Sterile dressing applied    A 2 inch needle was used and I was able to hub the entire needle  This makes it fairly certain that I am within the intercondylar notch/joint space  There was no resistance encountered during the actual injection  Prior to the procedure, the patient was informed of the following risks in layman terms:    - Risk of bleeding since a needle is involved  - Risk of infection (1/10,000 chance as per recent studies)  Signs/symptoms were discussed and they would prompt an urgent evaluation at an emergency department   - Risk of synovitis from the viscosupplementation  After going over these risks, we decided that the benefits outweigh the risks and proceeded with the procedure  Large joint arthrocentesis: L knee  Universal Protocol:  Consent given by: patient  Timeout called at: 2/26/2021 2:35 PM   Site marked: the operative site was marked  Supporting Documentation  Indications: pain   Procedure Details  Location: knee - L knee  Needle gauge: 21G 2''  Ultrasound guidance: no  Approach: Inferolateral to patella  Medications administered: 20 mg Sodium Hyaluronate 20 MG/2ML    Patient tolerance: patient tolerated the procedure well with no immediate complications  Dressing:  Sterile dressing applied    A 2 inch needle was used and I was able to hub the entire needle  This makes it fairly certain that I am within the intercondylar notch/joint space  There was no resistance encountered during the actual injection  Prior to the procedure, the patient was informed of the following risks in layman terms:    - Risk of bleeding since a needle is involved  - Risk of infection (1/10,000 chance as per recent studies)  Signs/symptoms were discussed and they would prompt an urgent evaluation at an emergency department   - Risk of synovitis from the viscosupplementation      After going over these risks, we decided that the benefits outweigh the risks and proceeded with the procedure

## 2021-03-03 ENCOUNTER — TELEPHONE (OUTPATIENT)
Dept: UROLOGY | Facility: MEDICAL CENTER | Age: 76
End: 2021-03-03

## 2021-03-03 NOTE — TELEPHONE ENCOUNTER
Patient seen by Hector Saleh In [facility]    Patient's daughter called stating she needed to cancel mom's appointment due to her not feeling well  She would like to know if appointment for cysto can be set up for afternoon       Please call her to schedule [phone]

## 2021-03-03 NOTE — TELEPHONE ENCOUNTER
Patient was scheduled for recurrent UTI cysto  Can be rescheduled with either MD for next available per her time preferences

## 2021-03-06 DIAGNOSIS — F02.81 LATE ONSET ALZHEIMER'S DISEASE WITH BEHAVIORAL DISTURBANCE (HCC): ICD-10-CM

## 2021-03-06 DIAGNOSIS — G30.1 LATE ONSET ALZHEIMER'S DISEASE WITH BEHAVIORAL DISTURBANCE (HCC): ICD-10-CM

## 2021-03-08 RX ORDER — MEMANTINE HYDROCHLORIDE 10 MG/1
TABLET ORAL
Qty: 180 TABLET | Refills: 0 | Status: SHIPPED | OUTPATIENT
Start: 2021-03-08 | End: 2021-07-05

## 2021-03-24 DIAGNOSIS — I10 ESSENTIAL HYPERTENSION: ICD-10-CM

## 2021-03-24 RX ORDER — AMLODIPINE BESYLATE 10 MG/1
TABLET ORAL
Qty: 90 TABLET | Refills: 1 | Status: SHIPPED | OUTPATIENT
Start: 2021-03-24 | End: 2021-07-06 | Stop reason: SDUPTHER

## 2021-03-31 ENCOUNTER — IMMUNIZATIONS (OUTPATIENT)
Dept: FAMILY MEDICINE CLINIC | Facility: HOSPITAL | Age: 76
End: 2021-03-31

## 2021-03-31 DIAGNOSIS — Z23 ENCOUNTER FOR IMMUNIZATION: Primary | ICD-10-CM

## 2021-03-31 PROCEDURE — 91300 SARS-COV-2 / COVID-19 MRNA VACCINE (PFIZER-BIONTECH) 30 MCG: CPT

## 2021-03-31 PROCEDURE — 0001A SARS-COV-2 / COVID-19 MRNA VACCINE (PFIZER-BIONTECH) 30 MCG: CPT

## 2021-04-01 DIAGNOSIS — E11.9 TYPE 2 DIABETES MELLITUS WITHOUT COMPLICATION, WITHOUT LONG-TERM CURRENT USE OF INSULIN (HCC): ICD-10-CM

## 2021-04-01 RX ORDER — LINAGLIPTIN 5 MG/1
TABLET, FILM COATED ORAL
Qty: 90 TABLET | Refills: 2 | Status: SHIPPED | OUTPATIENT
Start: 2021-04-01 | End: 2021-07-06 | Stop reason: SDUPTHER

## 2021-04-02 DIAGNOSIS — I10 ESSENTIAL HYPERTENSION: ICD-10-CM

## 2021-04-02 RX ORDER — HYDRALAZINE HYDROCHLORIDE 25 MG/1
TABLET, FILM COATED ORAL
Qty: 90 TABLET | Refills: 1 | Status: SHIPPED | OUTPATIENT
Start: 2021-04-02 | End: 2021-05-02

## 2021-04-05 DIAGNOSIS — F02.81 LATE ONSET ALZHEIMER'S DISEASE WITH BEHAVIORAL DISTURBANCE (HCC): ICD-10-CM

## 2021-04-05 DIAGNOSIS — G30.1 LATE ONSET ALZHEIMER'S DISEASE WITH BEHAVIORAL DISTURBANCE (HCC): ICD-10-CM

## 2021-04-05 RX ORDER — RISPERIDONE 0.25 MG/1
0.25 TABLET, FILM COATED ORAL 2 TIMES DAILY
Qty: 60 TABLET | Refills: 0 | Status: SHIPPED | OUTPATIENT
Start: 2021-04-05 | End: 2021-04-30

## 2021-04-22 DIAGNOSIS — E55.9 VITAMIN D DEFICIENCY: ICD-10-CM

## 2021-04-23 ENCOUNTER — IMMUNIZATIONS (OUTPATIENT)
Dept: FAMILY MEDICINE CLINIC | Facility: HOSPITAL | Age: 76
End: 2021-04-23

## 2021-04-23 DIAGNOSIS — Z23 ENCOUNTER FOR IMMUNIZATION: Primary | ICD-10-CM

## 2021-04-23 PROCEDURE — 0002A SARS-COV-2 / COVID-19 MRNA VACCINE (PFIZER-BIONTECH) 30 MCG: CPT

## 2021-04-23 PROCEDURE — 91300 SARS-COV-2 / COVID-19 MRNA VACCINE (PFIZER-BIONTECH) 30 MCG: CPT

## 2021-04-23 RX ORDER — CALCIFEDIOL 30 UG/1
CAPSULE, EXTENDED RELEASE ORAL
Qty: 30 CAPSULE | Refills: 8 | OUTPATIENT
Start: 2021-04-23

## 2021-04-30 DIAGNOSIS — G30.1 LATE ONSET ALZHEIMER'S DISEASE WITH BEHAVIORAL DISTURBANCE (HCC): ICD-10-CM

## 2021-04-30 DIAGNOSIS — F02.81 LATE ONSET ALZHEIMER'S DISEASE WITH BEHAVIORAL DISTURBANCE (HCC): ICD-10-CM

## 2021-04-30 RX ORDER — RISPERIDONE 0.25 MG/1
0.25 TABLET, FILM COATED ORAL 2 TIMES DAILY
Qty: 60 TABLET | Refills: 0 | Status: SHIPPED | OUTPATIENT
Start: 2021-04-30 | End: 2021-07-06 | Stop reason: SDUPTHER

## 2021-05-02 DIAGNOSIS — I10 ESSENTIAL HYPERTENSION: ICD-10-CM

## 2021-05-02 RX ORDER — HYDRALAZINE HYDROCHLORIDE 25 MG/1
TABLET, FILM COATED ORAL
Qty: 90 TABLET | Refills: 1 | Status: SHIPPED | OUTPATIENT
Start: 2021-05-02 | End: 2021-05-17

## 2021-05-13 NOTE — TELEPHONE ENCOUNTER
Patient's daughter called stating she was not able to bring her to the appointment  She thought she needed blood work  she wants to verify it  She wanted if the April was still available        She can be reached at [phone] (O)

## 2021-05-14 ENCOUNTER — TELEPHONE (OUTPATIENT)
Dept: GERIATRICS | Age: 76
End: 2021-05-14

## 2021-05-14 DIAGNOSIS — G30.1 LATE ONSET ALZHEIMER'S DISEASE WITHOUT BEHAVIORAL DISTURBANCE (HCC): ICD-10-CM

## 2021-05-14 DIAGNOSIS — F02.80 LATE ONSET ALZHEIMER'S DISEASE WITHOUT BEHAVIORAL DISTURBANCE (HCC): ICD-10-CM

## 2021-05-14 DIAGNOSIS — F33.1 MODERATE EPISODE OF RECURRENT MAJOR DEPRESSIVE DISORDER (HCC): ICD-10-CM

## 2021-05-14 RX ORDER — DONEPEZIL HYDROCHLORIDE 10 MG/1
TABLET, FILM COATED ORAL
Qty: 90 TABLET | Refills: 1 | OUTPATIENT
Start: 2021-05-14

## 2021-05-14 RX ORDER — BUPROPION HYDROCHLORIDE 150 MG/1
TABLET, EXTENDED RELEASE ORAL
Qty: 180 TABLET | Refills: 1 | OUTPATIENT
Start: 2021-05-14

## 2021-05-16 DIAGNOSIS — I10 ESSENTIAL HYPERTENSION: ICD-10-CM

## 2021-05-17 ENCOUNTER — OFFICE VISIT (OUTPATIENT)
Dept: GERIATRICS | Age: 76
End: 2021-05-17
Payer: MEDICARE

## 2021-05-17 VITALS
SYSTOLIC BLOOD PRESSURE: 164 MMHG | TEMPERATURE: 97.2 F | HEIGHT: 65 IN | WEIGHT: 186.3 LBS | DIASTOLIC BLOOD PRESSURE: 70 MMHG | BODY MASS INDEX: 31.04 KG/M2 | HEART RATE: 93 BPM | OXYGEN SATURATION: 98 %

## 2021-05-17 DIAGNOSIS — N18.4 CKD (CHRONIC KIDNEY DISEASE) STAGE 4, GFR 15-29 ML/MIN (HCC): ICD-10-CM

## 2021-05-17 DIAGNOSIS — F02.80 LATE ONSET ALZHEIMER'S DISEASE WITHOUT BEHAVIORAL DISTURBANCE (HCC): Primary | ICD-10-CM

## 2021-05-17 DIAGNOSIS — I12.9 TYPE 2 DIABETES MELLITUS WITH STAGE 4 CHRONIC KIDNEY DISEASE AND HYPERTENSION (HCC): ICD-10-CM

## 2021-05-17 DIAGNOSIS — N18.4 TYPE 2 DIABETES MELLITUS WITH STAGE 4 CHRONIC KIDNEY DISEASE AND HYPERTENSION (HCC): ICD-10-CM

## 2021-05-17 DIAGNOSIS — F33.1 MODERATE EPISODE OF RECURRENT MAJOR DEPRESSIVE DISORDER (HCC): ICD-10-CM

## 2021-05-17 DIAGNOSIS — G30.1 LATE ONSET ALZHEIMER'S DISEASE WITHOUT BEHAVIORAL DISTURBANCE (HCC): Primary | ICD-10-CM

## 2021-05-17 DIAGNOSIS — R26.81 GAIT INSTABILITY: ICD-10-CM

## 2021-05-17 DIAGNOSIS — Z79.899 POLYPHARMACY: ICD-10-CM

## 2021-05-17 DIAGNOSIS — E78.49 OTHER HYPERLIPIDEMIA: ICD-10-CM

## 2021-05-17 DIAGNOSIS — F41.9 ANXIETY: ICD-10-CM

## 2021-05-17 DIAGNOSIS — R41.0 CONFUSION: ICD-10-CM

## 2021-05-17 DIAGNOSIS — E55.9 VITAMIN D DEFICIENCY: ICD-10-CM

## 2021-05-17 DIAGNOSIS — J30.9 ALLERGIC RHINITIS, UNSPECIFIED SEASONALITY, UNSPECIFIED TRIGGER: ICD-10-CM

## 2021-05-17 DIAGNOSIS — E11.22 TYPE 2 DIABETES MELLITUS WITH STAGE 4 CHRONIC KIDNEY DISEASE AND HYPERTENSION (HCC): ICD-10-CM

## 2021-05-17 DIAGNOSIS — I10 ESSENTIAL HYPERTENSION: ICD-10-CM

## 2021-05-17 PROCEDURE — 99215 OFFICE O/P EST HI 40 MIN: CPT | Performed by: STUDENT IN AN ORGANIZED HEALTH CARE EDUCATION/TRAINING PROGRAM

## 2021-05-17 RX ORDER — HYDRALAZINE HYDROCHLORIDE 25 MG/1
TABLET, FILM COATED ORAL
Qty: 270 TABLET | Refills: 1 | Status: SHIPPED | OUTPATIENT
Start: 2021-05-17 | End: 2021-07-06 | Stop reason: SDUPTHER

## 2021-05-17 NOTE — PROGRESS NOTES
[facility]  [address] [facility], [address] [place] [address], [address] [place] [address]    PRIMARY CARE PRACTICE FOR OLDER ADULTS      NAME: Alena Marvin  AGE: 77 y o  SEX: female    DATE OF ENCOUNTER:   05/17/2021    Assessment and Plan     Problem List Items Addressed This Visit        Endocrine    Type 2 diabetes mellitus with stage 4 chronic kidney disease and hypertension ([facility] )       Lab Results   Component Value Date    HGBA1C 5 5 08/11/2020     Patient currently following with nephrology   Recommend adherence to a renal, heart healthy, diabetic diet   Patient continues on tradjenta  Will repeat HbA1c           Relevant Orders    Hemoglobin A1C       Respiratory    Allergic rhinitis      Will start Flonase nasal spray   Continue Zyrtec as needed  Would recommend transitioning to [place] in the future         Relevant Medications    fluticasone (FLONASE) 50 mcg/act nasal spray       Cardiovascular and Mediastinum    Essential hypertension      /70   Daughter stating she decreased hydralazine to once daily  Recommended b i d  dosing of hydralazine  Continue amlodipine 10 mg daily   Continue losartan  Follow-up with Nephrology as scheduled                Nervous and Auditory    Late onset Alzheimer's disease without behavioral disturbance ([facility] ) - Primary     Patient with a history of severe dementia   Continues on Aricept and Namenda   Continues to have progressive decline per daughter   Patient is dependent in most ADLs and all IADLs   Daughter with significant caregiver burnout   Currently and previously the patient has met criteria for placement into a higher level of care such as a memory care unit    Daughter had opted to keep the patient at home however patient has had progressively increasing needs  Reiterated that given the severity of patient's dementia, provision of a  safe, supervised environment, with meals, administration of medications and the ability for the patient remain active mentally, physically and socially, should the patient be placed into a higher level of care  Now with very frequent incontinence and requiring repeated reorientation and redirection   Manage chronic conditions  Maintain Falls precautions  Encourage patient to remain active mentally, physically and socially  Patient should participate in cognitively challenging exercises as able            Relevant Orders    CBC and differential    Comprehensive metabolic panel    TSH, 3rd generation with T4 reflex    Vitamin B12    Folate    Confusion      Patient with acute confusion   Daughter requesting urine studies  Relevant Orders    Urine culture    Urinalysis with microscopic       Genitourinary    CKD (chronic kidney disease) stage 4, GFR 15-29 ml/min ([facility])     Lab Results   Component Value Date    EGFR 28 08/11/2020    CREATININE 1 97 (H) 08/11/2020   Recommend patient follow-up with nephrology   Avoid nephrotoxic agents  Medications to be renally dosed            Other    Moderate episode of recurrent major depressive disorder ([facility] )      Patient continues to follow with Psychiatry   Continue trazodone 50 mg p o  HS   Continue Celexa 40 mg daily   Continue bupropion 150 mg b i d  Continue risperidone 0 25 mg b i d    Encourage patient to remain active mentally, physically and socially   No HI/SI         Vitamin D deficiency      Continues on calcifediol ER  Continue routine follow-up with nephrology         Other hyperlipidemia    Relevant Orders    Lipid panel    Polypharmacy      Patient with significant polypharmacy and psychotropic medication  However given dementia with behaviors, would defer medication changes and continue with Psychiatry recommendations         Anxiety      Overall anxiety remains stable   Patient currently following with Psychiatry   Continue Wellbutrin, Celexa, risperidone, trazodone  Encourage patient to remain active mentally, physically and socially         Gait instability     TUGT 10sec  No recent falls   Patient has completed physical therapy                   - Counseling Documentation: patient was counseled regarding: diagnostic results, instructions for management, risk factor reductions, prognosis, patient and family education, impressions, risks and benefits of treatment options and importance of compliance with treatment    Chief Complaint     Patient presents for routine follow-up of acute and chronic conditions    History of Present Illness     HPI    This is a 16-year-old female with severe Alzheimer's dementia, depression, anxiety, CKD 4, type 2 diabetes, hypertension, glaucoma and urinary incontinence amongst other medical conditions who presents for routine follow-up of acute and chronic conditions  The patient is accompanied by her daughter who is her primary caretaker  Given severity of dementia, review of systems are unreliable  Per daughter, the patient has continued to have a progressive decline in overall cognition  The patient has now become dependent in most ADLs and all IADLs  Recently, the patient has had significantly worsening urinary incontinence and she has been leaking urine throughout the house  She also has been walking around the house without pants and removing her gown  The patient also yells and often shouts at the patient's  11year-old grandson resides in the house  The patient did have a decreased appetite however over the past few weeks, daughter states that she has been tolerating oral intake better  The patient does eat a regular breakfast and lunch however nibbles at dinner  Daughter states she typically will leave dinner out overnight as she has noticed the patient will eat during the night if the food is left out  Overall, the patient's posture has also changed as the patient often ambulates leaning forward  She has sustained 2 falls in the past 5 months however has had no head trauma or loss of consciousness    Daughter is notably overwhelmed during today's visit  I did reiterate and had a detailed conversation with the patient's daughter that she certainly has tried her best in caring for her mother  However given that the patient's needs have progressively increased and the patient does require 24/7 supervision, I would recommend the patient be placed into a higher level of care  In doing so, this will ensure provision of meals, administration of medications, a supervised environment at all times and the ability to remain active mentally, physically and socially  Patient's daughter is at high risk of caregiver burnout and certainly requires some support  MSW to provide the necessary referrals and resources  Patient continues on Namenda and Aricept for history of severe dementia which continues to decline  She has been compliant with ferrous sulfate for a history of anemia    She also continues on omeprazole for a history of GERD with symptoms remaining stable        The following portions of the patient's history were reviewed and updated as appropriate: allergies, current medications, past family history, past medical history, past social history, past surgical history and problem list     Review of Systems     Review of Systems   Unable to perform ROS: Dementia       Active Problem List     Patient Active Problem List   Diagnosis    History of recurrent UTIs    Sleep difficulties    Late onset Alzheimer's disease without behavioral disturbance ([address] )    Chronic pain of both knees    Moderate episode of recurrent major depressive disorder ([address] )    Vitamin D deficiency    Other hyperlipidemia    Type 2 diabetes mellitus with stage 4 chronic kidney disease and hypertension ([address] )    Acute cystitis without hematuria    Encounter for screening colonoscopy    Bilateral primary osteoarthritis of knee    CKD (chronic kidney disease) stage 4, GFR 15-29 ml/min ([facility])    Polypharmacy    Anxiety    Gait instability    Vertigo    Essential hypertension    Confusion    Allergic rhinitis       Objective     /70 (BP Location: Right arm, Patient Position: Sitting, Cuff Size: Extra-Large)   Pulse 93   Temp (!) 97 2 °F (36 2 °C) (Temporal)   Ht 5' 5" (1 651 m)   Wt 84 5 kg (186 lb 4 8 oz)   SpO2 98%   BMI 31 00 kg/m²     Physical Exam  Vitals signs reviewed  Constitutional:       General: She is not in acute distress  Appearance: Normal appearance  She is well-developed  She is not ill-appearing or diaphoretic  Comments: Elderly female sitting comfortably in chair in no obvious cardiorespiratory or painful distress   HENT:      Head: Normocephalic and atraumatic  Right Ear: Tympanic membrane, ear canal and external ear normal       Left Ear: Tympanic membrane, ear canal and external ear normal       Nose: Nose normal  No congestion or rhinorrhea  Mouth/Throat:      Mouth: Mucous membranes are moist       Pharynx: Oropharynx is clear  No oropharyngeal exudate  Eyes:      General: No scleral icterus  Right eye: No discharge  Left eye: No discharge  Conjunctiva/sclera: Conjunctivae normal    Neck:      Musculoskeletal: Normal range of motion and neck supple  Vascular: No JVD  Cardiovascular:      Rate and Rhythm: Normal rate and regular rhythm  Heart sounds: Normal heart sounds  No murmur  No friction rub  No gallop  Pulmonary:      Effort: Pulmonary effort is normal  No respiratory distress  Breath sounds: Normal breath sounds  No wheezing or rales  Abdominal:      General: Bowel sounds are normal  There is no distension  Palpations: Abdomen is soft  Tenderness: There is no abdominal tenderness  There is no guarding  Musculoskeletal: Normal range of motion  General: No tenderness or deformity  Skin:     General: Skin is warm  Coloration: Skin is not pale  Findings: No erythema or rash  Neurological:      Mental Status: She is alert   Mental status is at baseline  She is disoriented  Cranial Nerves: No cranial nerve deficit  Gait: Gait normal       Deep Tendon Reflexes: Reflexes are normal and symmetric        Comments: Moving all limbs  Follows some commands  Oriented to self only   Psychiatric:      Comments: Pleasantly confused at baseline         Pertinent Laboratory/Diagnostic Studies:  Reviewed prior blood work  Will order CBC, CMP, folate, TSH, lipid panel, HbA1c, B12    Current Medications     Current Outpatient Medications:     amLODIPine (NORVASC) 10 mg tablet, TAKE 1 TABLET BY MOUTH EVERY DAY, Disp: 90 tablet, Rfl: 1    Ascorbic Acid (VITAMIN C) 100 MG tablet, Take 100 mg by mouth daily, Disp: , Rfl:     aspirin (ECOTRIN LOW STRENGTH) 81 mg EC tablet, Take 81 mg by mouth daily, Disp: , Rfl:     atorvastatin (LIPITOR) 40 mg tablet, Take 1 tablet (40 mg total) by mouth daily, Disp: 90 tablet, Rfl: 1    Azopt 1 % ophthalmic suspension, , Disp: , Rfl:     Brimonidine Tartrate-Timolol (COMBIGAN OP), Apply to eye, Disp: , Rfl:     buPROPion (WELLBUTRIN SR) 150 mg 12 hr tablet, Take 1 tablet (150 mg total) by mouth 2 (two) times a day, Disp: 180 tablet, Rfl: 1    Calcifediol ER (Rayaldee) 30 MCG CPCR, Take 30 mcg by mouth daily, Disp: 90 capsule, Rfl: 2    cephalexin (KEFLEX) 500 mg capsule, Take 500 mg by mouth every 6 (six) hours, Disp: , Rfl:     cetirizine (ZyrTEC) 10 mg tablet, Take 10 mg by mouth daily, Disp: , Rfl:     citalopram (CeleXA) 40 mg tablet, Take 1 tablet (40 mg total) by mouth daily, Disp: 90 tablet, Rfl: 1    diclofenac sodium (VOLTAREN) 1 %, Apply 2 g topically 3 (three) times a day as needed (Pain), Disp: 100 g, Rfl: 1    Docusate Sodium (COLACE PO), Take by mouth, Disp: , Rfl:     donepezil (ARICEPT) 10 mg tablet, Take 1 tablet (10 mg total) by mouth daily at bedtime, Disp: 90 tablet, Rfl: 1    dorzolamide (TRUSOPT) 2 % ophthalmic solution, 1 drop 3 (three) times a day, Disp: , Rfl:     ferrous sulfate 325 (65 Fe) mg tablet, Take 325 mg by mouth daily with breakfast, Disp: , Rfl:     furosemide (LASIX) 20 mg tablet, Take 20 mg by mouth 2 (two) times a day, Disp: , Rfl:     hydrALAZINE (APRESOLINE) 25 mg tablet, TAKE 1 TABLET BY MOUTH THREE TIMES A DAY, Disp: 270 tablet, Rfl: 1    Latanoprostene Bunod (Vyzulta) 0 024 % SOLN, Apply to eye, Disp: , Rfl:     losartan (COZAAR) 25 mg tablet, Take 25 mg by mouth daily, Disp: , Rfl:     meclizine (ANTIVERT) 25 mg tablet, Take 1 tablet (25 mg total) by mouth 2 (two) times a day, Disp: 30 tablet, Rfl: 0    memantine (NAMENDA) 10 mg tablet, TAKE 1 TABLET BY MOUTH TWICE A DAY, Disp: 180 tablet, Rfl: 0    Multiple Vitamins-Minerals (CENTRUM SILVER PO), Take by mouth, Disp: , Rfl:     nitrofurantoin (MACROBID) 100 mg capsule, Take 1 capsule (100 mg total) by mouth 2 (two) times a day, Disp: 10 capsule, Rfl: 0    omeprazole (PriLOSEC) 20 mg delayed release capsule, Take 20 mg by mouth daily, Disp: , Rfl:     risperiDONE (RisperDAL) 0 25 mg tablet, TAKE 1 TABLET (0 25 MG TOTAL) BY MOUTH 2 (TWO) TIMES A DAY, Disp: 60 tablet, Rfl: 0    Tradjenta 5 MG TABS, TAKE 1 TABLET DAILY, Disp: 90 tablet, Rfl: 2    traZODone (DESYREL) 50 mg tablet, Take 1 tablet (50 mg total) by mouth daily at bedtime, Disp: 90 tablet, Rfl: 1    fluticasone (FLONASE) 50 mcg/act nasal spray, 1 spray into each nostril daily, Disp: 1 g, Rfl: 2    Health Maintenance     Health Maintenance   Topic Date Due    Hepatitis C Screening  Never done    Medicare Annual Wellness Visit (AWV)  Never done    MAMMOGRAM  Never done    Diabetic Foot Exam  Never done    DM Eye Exam  Never done    BMI: Followup Plan  Never done    DTaP,Tdap,and Td Vaccines (1 - Tdap) Never done    Colorectal Cancer Screening  Never done    Fall Risk  Never done    Pneumococcal Vaccine: 65+ Years (1 of 1 - PPSV23) Never done    HEMOGLOBIN A1C  02/11/2021    Influenza Vaccine (Season Ended) 09/01/2021    BMI: Adult  05/17/2022  COVID-19 Vaccine  Completed    HIB Vaccine  Aged Out    Hepatitis B Vaccine  Aged Out    IPV Vaccine  Aged Out    Hepatitis A Vaccine  Aged Out    Meningococcal ACWY Vaccine  Aged Out    HPV Vaccine  Aged Out     Immunization History   Administered Date(s) Administered    SARS-CoV-2 / COVID-19 mRNA IM (Pfizer-BioNTech) 03/31/2021, 04/23/2021       Benjamin Pressley MD  Geriatrics   5/18/2021 7:02 AM

## 2021-05-18 ENCOUNTER — TELEPHONE (OUTPATIENT)
Dept: GERIATRICS | Age: 76
End: 2021-05-18

## 2021-05-18 PROBLEM — J30.9 ALLERGIC RHINITIS: Status: ACTIVE | Noted: 2021-05-18

## 2021-05-18 RX ORDER — FLUTICASONE PROPIONATE 50 MCG
1 SPRAY, SUSPENSION (ML) NASAL DAILY
Qty: 1 G | Refills: 2 | Status: SHIPPED | OUTPATIENT
Start: 2021-05-18 | End: 2021-09-21 | Stop reason: SDUPTHER

## 2021-05-18 NOTE — ASSESSMENT & PLAN NOTE
Will start Flonase nasal spray   Continue Zyrtec as needed    Would recommend transitioning to [place] in the future

## 2021-05-18 NOTE — ASSESSMENT & PLAN NOTE
Lab Results   Component Value Date    EGFR 28 08/11/2020    CREATININE 1 97 (H) 08/11/2020   Recommend patient follow-up with nephrology   Avoid nephrotoxic agents  Medications to be renally dosed

## 2021-05-18 NOTE — ASSESSMENT & PLAN NOTE
Lab Results   Component Value Date    HGBA1C 5 5 08/11/2020     Patient currently following with nephrology   Recommend adherence to a renal, heart healthy, diabetic diet   Patient continues on tradjenta  Will repeat HbA1c

## 2021-05-18 NOTE — TELEPHONE ENCOUNTER
Message  Received: Today  Message Contents   MD Angelita Ronquillo, OLIVIER             [facility] call patient's daughter has this patient does have severe dementia   Daughter with significant caregiver burnout and would appreciate if he can provide resources for placement into a higher level of care or home health aides          Above message received for follow-up  [facility] she is currently unavailable but requests a call back in one hour

## 2021-05-18 NOTE — TELEPHONE ENCOUNTER
LCSW contacted Giovanna to review home care and facility information  Giovanna just got off the phone with the agency [facility], and reports that at this time she is looking more for information about facility placement  She was already in touch with [facility] and explored memory care facilities, although they were out of her price range  They are seeking a comfortable facility that will provide 24/7 care for her when needed  LCSW noted other available placement agencies such [facility] and [facility]  Giovanna requests a referral to [facility]; LCSW will place referral today with Tiffany Rodriguez listed as the   Upon chart review, Dr Bynum Ore note states: "Currently and previously the patient has met criteria for placement into a higher level of care such as a memory care unit " Diagnosis of dementia and level of care recommendation will be listed on referral      LCSW to remain available as needed

## 2021-05-18 NOTE — ASSESSMENT & PLAN NOTE
/70   Daughter stating she decreased hydralazine to once daily  Recommended b i d  dosing of hydralazine  Continue amlodipine 10 mg daily   Continue losartan  Follow-up with Nephrology as scheduled

## 2021-05-18 NOTE — ASSESSMENT & PLAN NOTE
Patient with significant polypharmacy and psychotropic medication  However given dementia with behaviors, would defer medication changes and continue with Psychiatry recommendations

## 2021-05-18 NOTE — ASSESSMENT & PLAN NOTE
Overall anxiety remains stable   Patient currently following with Psychiatry   Continue Wellbutrin, Celexa, risperidone, trazodone  Encourage patient to remain active mentally, physically and socially

## 2021-05-18 NOTE — ASSESSMENT & PLAN NOTE
Patient continues to follow with Psychiatry   Continue trazodone 50 mg p o  HS   Continue Celexa 40 mg daily   Continue bupropion 150 mg b i d  Continue risperidone 0 25 mg b i d    Encourage patient to remain active mentally, physically and socially   No HI/SI

## 2021-05-18 NOTE — ASSESSMENT & PLAN NOTE
Patient with a history of severe dementia   Continues on Aricept and Namenda   Continues to have progressive decline per daughter   Patient is dependent in most ADLs and all IADLs   Daughter with significant caregiver burnout   Currently and previously the patient has met criteria for placement into a higher level of care such as a memory care unit  Daughter had opted to keep the patient at home however patient has had progressively increasing needs  Reiterated that given the severity of patient's dementia, provision of a  safe, supervised environment, with meals, administration of medications and the ability for the patient remain active mentally, physically and socially, should the patient be placed into a higher level of care    Now with very frequent incontinence and requiring repeated reorientation and redirection   Manage chronic conditions  Maintain Falls precautions  Encourage patient to remain active mentally, physically and socially  Patient should participate in cognitively challenging exercises as able

## 2021-05-21 DIAGNOSIS — F02.81 LATE ONSET ALZHEIMER'S DISEASE WITH BEHAVIORAL DISTURBANCE (HCC): ICD-10-CM

## 2021-05-21 DIAGNOSIS — G30.1 LATE ONSET ALZHEIMER'S DISEASE WITH BEHAVIORAL DISTURBANCE (HCC): ICD-10-CM

## 2021-05-21 RX ORDER — RISPERIDONE 0.25 MG/1
0.25 TABLET, FILM COATED ORAL 2 TIMES DAILY
Qty: 60 TABLET | Refills: 0 | OUTPATIENT
Start: 2021-05-21

## 2021-06-08 ENCOUNTER — TELEPHONE (OUTPATIENT)
Dept: GERIATRICS | Age: 76
End: 2021-06-08

## 2021-06-08 NOTE — TELEPHONE ENCOUNTER
Daughter Jace Posada is on the communication release called and asked that we send face sheet, med list and last visit to [facility] intake department      [facility] Phone# [phone]  Fax# [phone]      All information faxed

## 2021-06-10 ENCOUNTER — PROCEDURE VISIT (OUTPATIENT)
Dept: UROLOGY | Facility: CLINIC | Age: 76
End: 2021-06-10
Payer: MEDICARE

## 2021-06-10 ENCOUNTER — APPOINTMENT (OUTPATIENT)
Dept: LAB | Facility: CLINIC | Age: 76
End: 2021-06-10
Payer: MEDICARE

## 2021-06-10 VITALS
BODY MASS INDEX: 30.16 KG/M2 | SYSTOLIC BLOOD PRESSURE: 142 MMHG | HEART RATE: 74 BPM | DIASTOLIC BLOOD PRESSURE: 76 MMHG | WEIGHT: 181 LBS | HEIGHT: 65 IN

## 2021-06-10 DIAGNOSIS — N30.00 ACUTE CYSTITIS WITHOUT HEMATURIA: Primary | ICD-10-CM

## 2021-06-10 DIAGNOSIS — G30.1 LATE ONSET ALZHEIMER'S DISEASE WITHOUT BEHAVIORAL DISTURBANCE (HCC): ICD-10-CM

## 2021-06-10 DIAGNOSIS — I12.9 TYPE 2 DIABETES MELLITUS WITH STAGE 4 CHRONIC KIDNEY DISEASE AND HYPERTENSION (HCC): ICD-10-CM

## 2021-06-10 DIAGNOSIS — E11.22 TYPE 2 DIABETES MELLITUS WITH STAGE 4 CHRONIC KIDNEY DISEASE AND HYPERTENSION (HCC): ICD-10-CM

## 2021-06-10 DIAGNOSIS — F02.80 LATE ONSET ALZHEIMER'S DISEASE WITHOUT BEHAVIORAL DISTURBANCE (HCC): ICD-10-CM

## 2021-06-10 DIAGNOSIS — N18.4 TYPE 2 DIABETES MELLITUS WITH STAGE 4 CHRONIC KIDNEY DISEASE AND HYPERTENSION (HCC): ICD-10-CM

## 2021-06-10 LAB
ALBUMIN SERPL BCP-MCNC: 3.4 G/DL (ref 3.5–5)
ALP SERPL-CCNC: 115 U/L (ref 46–116)
ALT SERPL W P-5'-P-CCNC: 21 U/L (ref 12–78)
ANION GAP SERPL CALCULATED.3IONS-SCNC: 11 MMOL/L (ref 4–13)
AST SERPL W P-5'-P-CCNC: 15 U/L (ref 5–45)
BASOPHILS # BLD AUTO: 0.04 THOUSANDS/ΜL (ref 0–0.1)
BASOPHILS NFR BLD AUTO: 1 % (ref 0–1)
BILIRUB SERPL-MCNC: 0.68 MG/DL (ref 0.2–1)
BUN SERPL-MCNC: 18 MG/DL (ref 5–25)
CALCIUM ALBUM COR SERPL-MCNC: 10.9 MG/DL (ref 8.3–10.1)
CALCIUM SERPL-MCNC: 10.4 MG/DL (ref 8.3–10.1)
CHLORIDE SERPL-SCNC: 106 MMOL/L (ref 100–108)
CO2 SERPL-SCNC: 24 MMOL/L (ref 21–32)
CREAT SERPL-MCNC: 1.67 MG/DL (ref 0.6–1.3)
EOSINOPHIL # BLD AUTO: 0.03 THOUSAND/ΜL (ref 0–0.61)
EOSINOPHIL NFR BLD AUTO: 0 % (ref 0–6)
ERYTHROCYTE [DISTWIDTH] IN BLOOD BY AUTOMATED COUNT: 14.7 % (ref 11.6–15.1)
EST. AVERAGE GLUCOSE BLD GHB EST-MCNC: 117 MG/DL
FERRITIN SERPL-MCNC: 36 NG/ML (ref 8–388)
FOLATE SERPL-MCNC: 9.6 NG/ML (ref 3.1–17.5)
GFR SERPL CREATININE-BSD FRML MDRD: 34 ML/MIN/1.73SQ M
GLUCOSE SERPL-MCNC: 170 MG/DL (ref 65–140)
HBA1C MFR BLD: 5.7 %
HCT VFR BLD AUTO: 38.3 % (ref 34.8–46.1)
HGB BLD-MCNC: 12.1 G/DL (ref 11.5–15.4)
IMM GRANULOCYTES # BLD AUTO: 0.04 THOUSAND/UL (ref 0–0.2)
IMM GRANULOCYTES NFR BLD AUTO: 1 % (ref 0–2)
IRON SATN MFR SERPL: 14 %
IRON SERPL-MCNC: 35 UG/DL (ref 50–170)
LYMPHOCYTES # BLD AUTO: 1 THOUSANDS/ΜL (ref 0.6–4.47)
LYMPHOCYTES NFR BLD AUTO: 13 % (ref 14–44)
MCH RBC QN AUTO: 28.5 PG (ref 26.8–34.3)
MCHC RBC AUTO-ENTMCNC: 31.6 G/DL (ref 31.4–37.4)
MCV RBC AUTO: 90 FL (ref 82–98)
MONOCYTES # BLD AUTO: 0.62 THOUSAND/ΜL (ref 0.17–1.22)
MONOCYTES NFR BLD AUTO: 8 % (ref 4–12)
NEUTROPHILS # BLD AUTO: 5.95 THOUSANDS/ΜL (ref 1.85–7.62)
NEUTS SEG NFR BLD AUTO: 77 % (ref 43–75)
NRBC BLD AUTO-RTO: 0 /100 WBCS
PHOSPHATE SERPL-MCNC: 2.8 MG/DL (ref 2.3–4.1)
PLATELET # BLD AUTO: 294 THOUSANDS/UL (ref 149–390)
PMV BLD AUTO: 9.4 FL (ref 8.9–12.7)
POTASSIUM SERPL-SCNC: 3.8 MMOL/L (ref 3.5–5.3)
PROT SERPL-MCNC: 6.9 G/DL (ref 6.4–8.2)
PTH-INTACT SERPL-MCNC: 130.8 PG/ML (ref 18.4–80.1)
RBC # BLD AUTO: 4.25 MILLION/UL (ref 3.81–5.12)
SODIUM SERPL-SCNC: 141 MMOL/L (ref 136–145)
TIBC SERPL-MCNC: 259 UG/DL (ref 250–450)
TSH SERPL DL<=0.05 MIU/L-ACNC: 2.47 UIU/ML (ref 0.36–3.74)
VIT B12 SERPL-MCNC: 533 PG/ML (ref 100–900)
WBC # BLD AUTO: 7.68 THOUSAND/UL (ref 4.31–10.16)

## 2021-06-10 PROCEDURE — 82607 VITAMIN B-12: CPT

## 2021-06-10 PROCEDURE — 80053 COMPREHEN METABOLIC PANEL: CPT

## 2021-06-10 PROCEDURE — 52000 CYSTOURETHROSCOPY: CPT | Performed by: UROLOGY

## 2021-06-10 PROCEDURE — 83550 IRON BINDING TEST: CPT

## 2021-06-10 PROCEDURE — 82728 ASSAY OF FERRITIN: CPT

## 2021-06-10 PROCEDURE — 84443 ASSAY THYROID STIM HORMONE: CPT

## 2021-06-10 PROCEDURE — 83540 ASSAY OF IRON: CPT

## 2021-06-10 PROCEDURE — 83036 HEMOGLOBIN GLYCOSYLATED A1C: CPT

## 2021-06-10 PROCEDURE — 85025 COMPLETE CBC W/AUTO DIFF WBC: CPT

## 2021-06-10 PROCEDURE — 36415 COLL VENOUS BLD VENIPUNCTURE: CPT

## 2021-06-10 PROCEDURE — 84100 ASSAY OF PHOSPHORUS: CPT

## 2021-06-10 PROCEDURE — 83970 ASSAY OF PARATHORMONE: CPT

## 2021-06-10 PROCEDURE — 82746 ASSAY OF FOLIC ACID SERUM: CPT

## 2021-06-10 RX ORDER — SULFAMETHOXAZOLE AND TRIMETHOPRIM 800; 160 MG/1; MG/1
1 TABLET ORAL EVERY 12 HOURS SCHEDULED
Qty: 10 TABLET | Refills: 0 | Status: SHIPPED | OUTPATIENT
Start: 2021-06-10 | End: 2021-06-15

## 2021-06-10 NOTE — PROGRESS NOTES
Cystoscopy     Date/Time 6/10/2021 4:27 PM     Performed by  Meli Hammond MD     Authorized by Meli Hammond MD          Procedure Details:  Procedure type: cystoscopy      Office Cystoscopy Procedure Note    Indication:    Hematuria    Informed consent   The risks, benefits, complications, treatment options, and expected outcomes were discussed with the patient  The patient concurred with the proposed plan and provided informed consent  Anesthesia  Lidocaine jelly 2%    Antibiotic prophylaxis   Bactrim b i d  prescribed following today's procedure    Procedure  In the presence of a female nurse, the patient was placed in the supine frog-legged position, was prepped and draped in the usual manner using sterile technique, and 2% lidocaine jelly instilled into the urethra  A 17 F flexible cystoscope was then inserted into the urethra and the urethra and bladder carefully examined  The following findings were noted:    Findings:  Urethra:  Normal  Bladder:  Normal,  Heavy sediment is noted in the floor of the bladder  This is irrigated in the full lining the mucosa is inspected with no papillary tumors or concerning erythematous patches  Ureteral orifices:  Normal  Other findings:  None     Specimens: None                 Complications:    None; patient tolerated the procedure well           Disposition: To home after 30 minute observation  Condition: Stable    Plan:   The patient has now completed a full hematuria evaluation including cystoscopy an appropriate upper tract imaging  Thankfully there are no signs of genitourinary pathology nor certainly any signs of malignancy  At this point the patient will follow up with Urology on an as-needed basis  I do recommend annual urinalysis to be obtained by the patient's primary care physician  If the patient were to have persistent hematuria over a 5 year duration, they should be referred back for repeat evaluation    Happy to see the patient back in sooner should the need arise  I also prescribed Bactrim based upon today's urinalysis results for  A 5 days  Prior culture data reviewed

## 2021-06-11 ENCOUNTER — DOCUMENTATION (OUTPATIENT)
Dept: NEPHROLOGY | Facility: CLINIC | Age: 76
End: 2021-06-11

## 2021-06-11 DIAGNOSIS — N18.4 CHRONIC KIDNEY DISEASE (CKD) STAGE G4/A2, SEVERELY DECREASED GLOMERULAR FILTRATION RATE (GFR) BETWEEN 15-29 ML/MIN/1.73 SQUARE METER AND ALBUMINURIA CREATININE RATIO BETWEEN 30-299 MG/G (HCC): Primary | ICD-10-CM

## 2021-06-11 DIAGNOSIS — E83.52 HYPERCALCEMIA: ICD-10-CM

## 2021-06-11 NOTE — PROGRESS NOTES
Daughter states mother's dementia is worsening  She takes no po calcium supplements per the daughter  As her appetite is declining, she gives her Ensure on a daily basis  Per Dr Mercy Templeton, to have ionized calcium, PTH, SPEP and free light chain assay prior to next visit in July  Can you let her know that her renal function is stable but her calcium was slightly elevated   Can you have her avoid any calcium supplements   And can you have her check an ionized calcium, PTH, SPEP and free light chain assay ratio before her next visit in July

## 2021-06-17 ENCOUNTER — HOSPITAL ENCOUNTER (INPATIENT)
Facility: HOSPITAL | Age: 76
LOS: 3 days | Discharge: NURSING, REHAB OR LONG-TERM CARE | DRG: 556 | End: 2021-06-21
Attending: EMERGENCY MEDICINE | Admitting: INTERNAL MEDICINE
Payer: MEDICARE

## 2021-06-17 DIAGNOSIS — N18.4 CKD (CHRONIC KIDNEY DISEASE) STAGE 4, GFR 15-29 ML/MIN (HCC): ICD-10-CM

## 2021-06-17 DIAGNOSIS — R26.2 AMBULATORY DYSFUNCTION: ICD-10-CM

## 2021-06-17 DIAGNOSIS — R41.82 AMS (ALTERED MENTAL STATUS): Primary | ICD-10-CM

## 2021-06-17 DIAGNOSIS — N39.0 UTI (URINARY TRACT INFECTION): ICD-10-CM

## 2021-06-17 DIAGNOSIS — F03.90 DEMENTIA (HCC): ICD-10-CM

## 2021-06-17 DIAGNOSIS — Z87.440 HISTORY OF RECURRENT UTIS: ICD-10-CM

## 2021-06-17 DIAGNOSIS — F33.1 MODERATE EPISODE OF RECURRENT MAJOR DEPRESSIVE DISORDER (HCC): ICD-10-CM

## 2021-06-17 DIAGNOSIS — R79.89 RAISED TSH LEVEL: ICD-10-CM

## 2021-06-17 PROCEDURE — 93005 ELECTROCARDIOGRAM TRACING: CPT

## 2021-06-17 PROCEDURE — 1124F ACP DISCUSS-NO DSCNMKR DOCD: CPT | Performed by: EMERGENCY MEDICINE

## 2021-06-17 PROCEDURE — 99285 EMERGENCY DEPT VISIT HI MDM: CPT

## 2021-06-18 ENCOUNTER — APPOINTMENT (EMERGENCY)
Dept: RADIOLOGY | Facility: HOSPITAL | Age: 76
DRG: 556 | End: 2021-06-18
Payer: MEDICARE

## 2021-06-18 ENCOUNTER — TELEPHONE (OUTPATIENT)
Dept: GERIATRICS | Age: 76
End: 2021-06-18

## 2021-06-18 ENCOUNTER — APPOINTMENT (EMERGENCY)
Dept: CT IMAGING | Facility: HOSPITAL | Age: 76
DRG: 556 | End: 2021-06-18
Payer: MEDICARE

## 2021-06-18 PROBLEM — R26.2 AMBULATORY DYSFUNCTION: Status: ACTIVE | Noted: 2021-06-18

## 2021-06-18 LAB
ALBUMIN SERPL BCP-MCNC: 3.5 G/DL (ref 3.5–5)
ALP SERPL-CCNC: 116 U/L (ref 46–116)
ALT SERPL W P-5'-P-CCNC: 18 U/L (ref 12–78)
AMMONIA PLAS-SCNC: <10 UMOL/L (ref 11–35)
ANION GAP SERPL CALCULATED.3IONS-SCNC: 12 MMOL/L (ref 4–13)
ANION GAP SERPL CALCULATED.3IONS-SCNC: 12 MMOL/L (ref 4–13)
APTT PPP: 25 SECONDS (ref 23–37)
AST SERPL W P-5'-P-CCNC: 19 U/L (ref 5–45)
ATRIAL RATE: 107 BPM
ATRIAL RATE: 87 BPM
BACTERIA UR QL AUTO: ABNORMAL /HPF
BASOPHILS # BLD AUTO: 0.04 THOUSANDS/ΜL (ref 0–0.1)
BASOPHILS # BLD AUTO: 0.05 THOUSANDS/ΜL (ref 0–0.1)
BASOPHILS NFR BLD AUTO: 1 % (ref 0–1)
BASOPHILS NFR BLD AUTO: 1 % (ref 0–1)
BILIRUB SERPL-MCNC: 0.58 MG/DL (ref 0.2–1)
BILIRUB UR QL STRIP: NEGATIVE
BUN SERPL-MCNC: 22 MG/DL (ref 5–25)
BUN SERPL-MCNC: 23 MG/DL (ref 5–25)
CALCIUM SERPL-MCNC: 10.2 MG/DL (ref 8.3–10.1)
CALCIUM SERPL-MCNC: 10.2 MG/DL (ref 8.3–10.1)
CHLORIDE SERPL-SCNC: 107 MMOL/L (ref 100–108)
CHLORIDE SERPL-SCNC: 109 MMOL/L (ref 100–108)
CK SERPL-CCNC: 91 U/L (ref 26–192)
CLARITY UR: CLEAR
CO2 SERPL-SCNC: 22 MMOL/L (ref 21–32)
CO2 SERPL-SCNC: 23 MMOL/L (ref 21–32)
COLOR UR: YELLOW
CREAT SERPL-MCNC: 1.69 MG/DL (ref 0.6–1.3)
CREAT SERPL-MCNC: 1.75 MG/DL (ref 0.6–1.3)
CRP SERPL HS-MCNC: 1.65 MG/L
EOSINOPHIL # BLD AUTO: 0.01 THOUSAND/ΜL (ref 0–0.61)
EOSINOPHIL # BLD AUTO: 0.03 THOUSAND/ΜL (ref 0–0.61)
EOSINOPHIL NFR BLD AUTO: 0 % (ref 0–6)
EOSINOPHIL NFR BLD AUTO: 0 % (ref 0–6)
ERYTHROCYTE [DISTWIDTH] IN BLOOD BY AUTOMATED COUNT: 14.9 % (ref 11.6–15.1)
ERYTHROCYTE [DISTWIDTH] IN BLOOD BY AUTOMATED COUNT: 15.1 % (ref 11.6–15.1)
GFR SERPL CREATININE-BSD FRML MDRD: 32 ML/MIN/1.73SQ M
GFR SERPL CREATININE-BSD FRML MDRD: 34 ML/MIN/1.73SQ M
GLUCOSE SERPL-MCNC: 110 MG/DL (ref 65–140)
GLUCOSE SERPL-MCNC: 114 MG/DL (ref 65–140)
GLUCOSE SERPL-MCNC: 140 MG/DL (ref 65–140)
GLUCOSE SERPL-MCNC: 141 MG/DL (ref 65–140)
GLUCOSE SERPL-MCNC: 141 MG/DL (ref 65–140)
GLUCOSE SERPL-MCNC: 142 MG/DL (ref 65–140)
GLUCOSE SERPL-MCNC: 166 MG/DL (ref 65–140)
GLUCOSE UR STRIP-MCNC: NEGATIVE MG/DL
HCT VFR BLD AUTO: 35.5 % (ref 34.8–46.1)
HCT VFR BLD AUTO: 36.1 % (ref 34.8–46.1)
HGB BLD-MCNC: 11.2 G/DL (ref 11.5–15.4)
HGB BLD-MCNC: 11.6 G/DL (ref 11.5–15.4)
HGB UR QL STRIP.AUTO: NEGATIVE
IMM GRANULOCYTES # BLD AUTO: 0.04 THOUSAND/UL (ref 0–0.2)
IMM GRANULOCYTES # BLD AUTO: 0.04 THOUSAND/UL (ref 0–0.2)
IMM GRANULOCYTES NFR BLD AUTO: 1 % (ref 0–2)
IMM GRANULOCYTES NFR BLD AUTO: 1 % (ref 0–2)
INR PPP: 0.99 (ref 0.84–1.19)
KETONES UR STRIP-MCNC: NEGATIVE MG/DL
LACTATE SERPL-SCNC: 0.9 MMOL/L (ref 0.5–2)
LEUKOCYTE ESTERASE UR QL STRIP: NEGATIVE
LYMPHOCYTES # BLD AUTO: 0.7 THOUSANDS/ΜL (ref 0.6–4.47)
LYMPHOCYTES # BLD AUTO: 1.26 THOUSANDS/ΜL (ref 0.6–4.47)
LYMPHOCYTES NFR BLD AUTO: 15 % (ref 14–44)
LYMPHOCYTES NFR BLD AUTO: 8 % (ref 14–44)
MCH RBC QN AUTO: 28.5 PG (ref 26.8–34.3)
MCH RBC QN AUTO: 28.5 PG (ref 26.8–34.3)
MCHC RBC AUTO-ENTMCNC: 31.5 G/DL (ref 31.4–37.4)
MCHC RBC AUTO-ENTMCNC: 32.1 G/DL (ref 31.4–37.4)
MCV RBC AUTO: 89 FL (ref 82–98)
MCV RBC AUTO: 90 FL (ref 82–98)
MONOCYTES # BLD AUTO: 0.54 THOUSAND/ΜL (ref 0.17–1.22)
MONOCYTES # BLD AUTO: 0.67 THOUSAND/ΜL (ref 0.17–1.22)
MONOCYTES NFR BLD AUTO: 7 % (ref 4–12)
MONOCYTES NFR BLD AUTO: 8 % (ref 4–12)
NEUTROPHILS # BLD AUTO: 6.2 THOUSANDS/ΜL (ref 1.85–7.62)
NEUTROPHILS # BLD AUTO: 7.03 THOUSANDS/ΜL (ref 1.85–7.62)
NEUTS SEG NFR BLD AUTO: 75 % (ref 43–75)
NEUTS SEG NFR BLD AUTO: 83 % (ref 43–75)
NITRITE UR QL STRIP: NEGATIVE
NON-SQ EPI CELLS URNS QL MICRO: ABNORMAL /HPF
NRBC BLD AUTO-RTO: 0 /100 WBCS
NRBC BLD AUTO-RTO: 0 /100 WBCS
P AXIS: 61 DEGREES
PH UR STRIP.AUTO: 6 [PH]
PLATELET # BLD AUTO: 285 THOUSANDS/UL (ref 149–390)
PLATELET # BLD AUTO: 285 THOUSANDS/UL (ref 149–390)
PLATELET # BLD AUTO: 300 THOUSANDS/UL (ref 149–390)
PMV BLD AUTO: 9 FL (ref 8.9–12.7)
PMV BLD AUTO: 9.1 FL (ref 8.9–12.7)
PMV BLD AUTO: 9.1 FL (ref 8.9–12.7)
POTASSIUM SERPL-SCNC: 3.9 MMOL/L (ref 3.5–5.3)
POTASSIUM SERPL-SCNC: 4.4 MMOL/L (ref 3.5–5.3)
PR INTERVAL: 168 MS
PROT SERPL-MCNC: 6.7 G/DL (ref 6.4–8.2)
PROT UR STRIP-MCNC: ABNORMAL MG/DL
PROTHROMBIN TIME: 13.2 SECONDS (ref 11.6–14.5)
QRS AXIS: -4 DEGREES
QRS AXIS: -8 DEGREES
QRSD INTERVAL: 86 MS
QRSD INTERVAL: 86 MS
QT INTERVAL: 316 MS
QT INTERVAL: 358 MS
QTC INTERVAL: 430 MS
QTC INTERVAL: 439 MS
RBC # BLD AUTO: 3.93 MILLION/UL (ref 3.81–5.12)
RBC # BLD AUTO: 4.07 MILLION/UL (ref 3.81–5.12)
RBC #/AREA URNS AUTO: ABNORMAL /HPF
SODIUM SERPL-SCNC: 142 MMOL/L (ref 136–145)
SODIUM SERPL-SCNC: 143 MMOL/L (ref 136–145)
SP GR UR STRIP.AUTO: >=1.03 (ref 1–1.03)
T WAVE AXIS: 66 DEGREES
T WAVE AXIS: 73 DEGREES
T4 FREE SERPL-MCNC: 1.12 NG/DL (ref 0.76–1.46)
TROPONIN I SERPL-MCNC: <0.02 NG/ML
TSH SERPL DL<=0.05 MIU/L-ACNC: 5.16 UIU/ML (ref 0.36–3.74)
UROBILINOGEN UR QL STRIP.AUTO: 1 E.U./DL
VENTRICULAR RATE: 116 BPM
VENTRICULAR RATE: 87 BPM
WBC # BLD AUTO: 8.25 THOUSAND/UL (ref 4.31–10.16)
WBC # BLD AUTO: 8.36 THOUSAND/UL (ref 4.31–10.16)
WBC #/AREA URNS AUTO: ABNORMAL /HPF

## 2021-06-18 PROCEDURE — G1004 CDSM NDSC: HCPCS

## 2021-06-18 PROCEDURE — 87086 URINE CULTURE/COLONY COUNT: CPT | Performed by: EMERGENCY MEDICINE

## 2021-06-18 PROCEDURE — 93005 ELECTROCARDIOGRAM TRACING: CPT

## 2021-06-18 PROCEDURE — 84484 ASSAY OF TROPONIN QUANT: CPT | Performed by: EMERGENCY MEDICINE

## 2021-06-18 PROCEDURE — 87040 BLOOD CULTURE FOR BACTERIA: CPT | Performed by: EMERGENCY MEDICINE

## 2021-06-18 PROCEDURE — 85025 COMPLETE CBC W/AUTO DIFF WBC: CPT | Performed by: PHYSICIAN ASSISTANT

## 2021-06-18 PROCEDURE — 96361 HYDRATE IV INFUSION ADD-ON: CPT

## 2021-06-18 PROCEDURE — 85049 AUTOMATED PLATELET COUNT: CPT | Performed by: PHYSICIAN ASSISTANT

## 2021-06-18 PROCEDURE — 80053 COMPREHEN METABOLIC PANEL: CPT | Performed by: EMERGENCY MEDICINE

## 2021-06-18 PROCEDURE — 85025 COMPLETE CBC W/AUTO DIFF WBC: CPT | Performed by: EMERGENCY MEDICINE

## 2021-06-18 PROCEDURE — 70450 CT HEAD/BRAIN W/O DYE: CPT

## 2021-06-18 PROCEDURE — 82948 REAGENT STRIP/BLOOD GLUCOSE: CPT

## 2021-06-18 PROCEDURE — 84443 ASSAY THYROID STIM HORMONE: CPT | Performed by: EMERGENCY MEDICINE

## 2021-06-18 PROCEDURE — 84439 ASSAY OF FREE THYROXINE: CPT | Performed by: EMERGENCY MEDICINE

## 2021-06-18 PROCEDURE — 82550 ASSAY OF CK (CPK): CPT | Performed by: EMERGENCY MEDICINE

## 2021-06-18 PROCEDURE — 96365 THER/PROPH/DIAG IV INF INIT: CPT

## 2021-06-18 PROCEDURE — 83605 ASSAY OF LACTIC ACID: CPT | Performed by: EMERGENCY MEDICINE

## 2021-06-18 PROCEDURE — 36415 COLL VENOUS BLD VENIPUNCTURE: CPT | Performed by: EMERGENCY MEDICINE

## 2021-06-18 PROCEDURE — 99285 EMERGENCY DEPT VISIT HI MDM: CPT | Performed by: EMERGENCY MEDICINE

## 2021-06-18 PROCEDURE — 82140 ASSAY OF AMMONIA: CPT | Performed by: EMERGENCY MEDICINE

## 2021-06-18 PROCEDURE — 81001 URINALYSIS AUTO W/SCOPE: CPT | Performed by: EMERGENCY MEDICINE

## 2021-06-18 PROCEDURE — 74022 RADEX COMPL AQT ABD SERIES: CPT

## 2021-06-18 PROCEDURE — 93010 ELECTROCARDIOGRAM REPORT: CPT | Performed by: INTERNAL MEDICINE

## 2021-06-18 PROCEDURE — 86141 C-REACTIVE PROTEIN HS: CPT | Performed by: EMERGENCY MEDICINE

## 2021-06-18 PROCEDURE — 97163 PT EVAL HIGH COMPLEX 45 MIN: CPT

## 2021-06-18 PROCEDURE — 97530 THERAPEUTIC ACTIVITIES: CPT

## 2021-06-18 PROCEDURE — 99223 1ST HOSP IP/OBS HIGH 75: CPT | Performed by: INTERNAL MEDICINE

## 2021-06-18 PROCEDURE — 85610 PROTHROMBIN TIME: CPT | Performed by: EMERGENCY MEDICINE

## 2021-06-18 PROCEDURE — 85730 THROMBOPLASTIN TIME PARTIAL: CPT | Performed by: EMERGENCY MEDICINE

## 2021-06-18 PROCEDURE — 80048 BASIC METABOLIC PNL TOTAL CA: CPT | Performed by: PHYSICIAN ASSISTANT

## 2021-06-18 RX ORDER — ATORVASTATIN CALCIUM 40 MG/1
40 TABLET, FILM COATED ORAL DAILY
Status: DISCONTINUED | OUTPATIENT
Start: 2021-06-18 | End: 2021-06-21 | Stop reason: HOSPADM

## 2021-06-18 RX ORDER — PANTOPRAZOLE SODIUM 40 MG/1
40 TABLET, DELAYED RELEASE ORAL
Status: DISCONTINUED | OUTPATIENT
Start: 2021-06-18 | End: 2021-06-21 | Stop reason: HOSPADM

## 2021-06-18 RX ORDER — SODIUM CHLORIDE 9 MG/ML
125 INJECTION, SOLUTION INTRAVENOUS CONTINUOUS
Status: DISCONTINUED | OUTPATIENT
Start: 2021-06-18 | End: 2021-06-20

## 2021-06-18 RX ORDER — AMLODIPINE BESYLATE 10 MG/1
10 TABLET ORAL DAILY
Status: DISCONTINUED | OUTPATIENT
Start: 2021-06-18 | End: 2021-06-21 | Stop reason: HOSPADM

## 2021-06-18 RX ORDER — ACETAMINOPHEN 325 MG/1
650 TABLET ORAL EVERY 6 HOURS PRN
Status: DISCONTINUED | OUTPATIENT
Start: 2021-06-18 | End: 2021-06-21 | Stop reason: HOSPADM

## 2021-06-18 RX ORDER — RISPERIDONE 0.25 MG/1
0.25 TABLET, FILM COATED ORAL 2 TIMES DAILY
Status: DISCONTINUED | OUTPATIENT
Start: 2021-06-18 | End: 2021-06-21 | Stop reason: HOSPADM

## 2021-06-18 RX ORDER — FUROSEMIDE 20 MG/1
20 TABLET ORAL
Status: DISCONTINUED | OUTPATIENT
Start: 2021-06-18 | End: 2021-06-21 | Stop reason: HOSPADM

## 2021-06-18 RX ORDER — DONEPEZIL HYDROCHLORIDE 5 MG/1
10 TABLET, FILM COATED ORAL
Status: DISCONTINUED | OUTPATIENT
Start: 2021-06-18 | End: 2021-06-21 | Stop reason: HOSPADM

## 2021-06-18 RX ORDER — HEPARIN SODIUM 5000 [USP'U]/ML
5000 INJECTION, SOLUTION INTRAVENOUS; SUBCUTANEOUS EVERY 8 HOURS SCHEDULED
Status: DISCONTINUED | OUTPATIENT
Start: 2021-06-18 | End: 2021-06-21 | Stop reason: HOSPADM

## 2021-06-18 RX ORDER — FLUTICASONE PROPIONATE 50 MCG
1 SPRAY, SUSPENSION (ML) NASAL DAILY
Status: DISCONTINUED | OUTPATIENT
Start: 2021-06-18 | End: 2021-06-21 | Stop reason: HOSPADM

## 2021-06-18 RX ORDER — MECLIZINE HYDROCHLORIDE 25 MG/1
25 TABLET ORAL 2 TIMES DAILY
Status: DISCONTINUED | OUTPATIENT
Start: 2021-06-18 | End: 2021-06-21 | Stop reason: HOSPADM

## 2021-06-18 RX ORDER — POLYETHYLENE GLYCOL 3350 17 G/17G
17 POWDER, FOR SOLUTION ORAL DAILY PRN
Status: DISCONTINUED | OUTPATIENT
Start: 2021-06-18 | End: 2021-06-21 | Stop reason: HOSPADM

## 2021-06-18 RX ORDER — BUPROPION HYDROCHLORIDE 150 MG/1
300 TABLET ORAL DAILY
Status: DISCONTINUED | OUTPATIENT
Start: 2021-06-18 | End: 2021-06-21 | Stop reason: HOSPADM

## 2021-06-18 RX ORDER — HYDRALAZINE HYDROCHLORIDE 25 MG/1
25 TABLET, FILM COATED ORAL 3 TIMES DAILY
Status: DISCONTINUED | OUTPATIENT
Start: 2021-06-18 | End: 2021-06-21 | Stop reason: HOSPADM

## 2021-06-18 RX ORDER — FERROUS SULFATE 325(65) MG
325 TABLET ORAL
Status: DISCONTINUED | OUTPATIENT
Start: 2021-06-18 | End: 2021-06-21 | Stop reason: HOSPADM

## 2021-06-18 RX ORDER — MEMANTINE HYDROCHLORIDE 10 MG/1
10 TABLET ORAL 2 TIMES DAILY
Status: DISCONTINUED | OUTPATIENT
Start: 2021-06-18 | End: 2021-06-21 | Stop reason: HOSPADM

## 2021-06-18 RX ORDER — AMOXICILLIN 250 MG
1 CAPSULE ORAL
Status: DISCONTINUED | OUTPATIENT
Start: 2021-06-18 | End: 2021-06-21 | Stop reason: HOSPADM

## 2021-06-18 RX ORDER — CITALOPRAM 20 MG/1
40 TABLET ORAL DAILY
Status: DISCONTINUED | OUTPATIENT
Start: 2021-06-18 | End: 2021-06-21 | Stop reason: HOSPADM

## 2021-06-18 RX ORDER — TRAZODONE HYDROCHLORIDE 50 MG/1
50 TABLET ORAL
Status: DISCONTINUED | OUTPATIENT
Start: 2021-06-18 | End: 2021-06-21 | Stop reason: HOSPADM

## 2021-06-18 RX ORDER — ASPIRIN 81 MG/1
81 TABLET ORAL DAILY
Status: DISCONTINUED | OUTPATIENT
Start: 2021-06-18 | End: 2021-06-21 | Stop reason: HOSPADM

## 2021-06-18 RX ORDER — DOCUSATE SODIUM 100 MG/1
100 CAPSULE, LIQUID FILLED ORAL DAILY
Status: DISCONTINUED | OUTPATIENT
Start: 2021-06-18 | End: 2021-06-21 | Stop reason: HOSPADM

## 2021-06-18 RX ORDER — LOSARTAN POTASSIUM 25 MG/1
25 TABLET ORAL DAILY
Status: DISCONTINUED | OUTPATIENT
Start: 2021-06-18 | End: 2021-06-21 | Stop reason: HOSPADM

## 2021-06-18 RX ADMIN — CITALOPRAM HYDROBROMIDE 40 MG: 20 TABLET ORAL at 10:43

## 2021-06-18 RX ADMIN — HEPARIN SODIUM 5000 UNITS: 5000 INJECTION INTRAVENOUS; SUBCUTANEOUS at 15:02

## 2021-06-18 RX ADMIN — FUROSEMIDE 20 MG: 40 TABLET ORAL at 16:02

## 2021-06-18 RX ADMIN — CEFTRIAXONE SODIUM 1000 MG: 10 INJECTION, POWDER, FOR SOLUTION INTRAVENOUS at 02:52

## 2021-06-18 RX ADMIN — DONEPEZIL HYDROCHLORIDE 10 MG: 5 TABLET, FILM COATED ORAL at 21:30

## 2021-06-18 RX ADMIN — ATORVASTATIN CALCIUM 40 MG: 40 TABLET, FILM COATED ORAL at 09:07

## 2021-06-18 RX ADMIN — AMLODIPINE BESYLATE 10 MG: 10 TABLET ORAL at 09:07

## 2021-06-18 RX ADMIN — TRAZODONE HYDROCHLORIDE 50 MG: 50 TABLET ORAL at 21:30

## 2021-06-18 RX ADMIN — SODIUM CHLORIDE 250 ML: 0.9 INJECTION, SOLUTION INTRAVENOUS at 01:10

## 2021-06-18 RX ADMIN — INSULIN LISPRO 1 UNITS: 100 INJECTION, SOLUTION INTRAVENOUS; SUBCUTANEOUS at 12:33

## 2021-06-18 RX ADMIN — DOCUSATE SODIUM AND SENNOSIDES 1 TABLET: 8.6; 5 TABLET, FILM COATED ORAL at 21:30

## 2021-06-18 RX ADMIN — SODIUM CHLORIDE 125 ML/HR: 0.9 INJECTION, SOLUTION INTRAVENOUS at 01:10

## 2021-06-18 RX ADMIN — HEPARIN SODIUM 5000 UNITS: 5000 INJECTION INTRAVENOUS; SUBCUTANEOUS at 21:29

## 2021-06-18 RX ADMIN — LOSARTAN POTASSIUM 25 MG: 25 TABLET, FILM COATED ORAL at 09:07

## 2021-06-18 RX ADMIN — FUROSEMIDE 20 MG: 40 TABLET ORAL at 09:02

## 2021-06-18 RX ADMIN — RISPERIDONE 0.25 MG: 0.25 TABLET ORAL at 10:43

## 2021-06-18 RX ADMIN — MEMANTINE 10 MG: 10 TABLET ORAL at 18:15

## 2021-06-18 RX ADMIN — FLUTICASONE PROPIONATE 1 SPRAY: 50 SPRAY, METERED NASAL at 09:08

## 2021-06-18 RX ADMIN — BUPROPION HYDROCHLORIDE 300 MG: 150 TABLET, EXTENDED RELEASE ORAL at 10:43

## 2021-06-18 RX ADMIN — MECLIZINE HYDROCHLORIDE 25 MG: 25 TABLET ORAL at 21:30

## 2021-06-18 RX ADMIN — DOCUSATE SODIUM 100 MG: 100 CAPSULE, LIQUID FILLED ORAL at 09:02

## 2021-06-18 RX ADMIN — MEMANTINE 10 MG: 10 TABLET ORAL at 10:43

## 2021-06-18 RX ADMIN — MECLIZINE HYDROCHLORIDE 25 MG: 25 TABLET ORAL at 09:02

## 2021-06-18 RX ADMIN — HYDRALAZINE HYDROCHLORIDE 25 MG: 25 TABLET, FILM COATED ORAL at 09:02

## 2021-06-18 RX ADMIN — RISPERIDONE 0.25 MG: 0.25 TABLET ORAL at 18:15

## 2021-06-18 RX ADMIN — ASPIRIN 81 MG: 81 TABLET, COATED ORAL at 09:02

## 2021-06-18 RX ADMIN — PANTOPRAZOLE SODIUM 40 MG: 40 TABLET, DELAYED RELEASE ORAL at 06:05

## 2021-06-18 RX ADMIN — HYDRALAZINE HYDROCHLORIDE 25 MG: 25 TABLET, FILM COATED ORAL at 16:02

## 2021-06-18 RX ADMIN — HYDRALAZINE HYDROCHLORIDE 25 MG: 25 TABLET, FILM COATED ORAL at 21:30

## 2021-06-18 RX ADMIN — HEPARIN SODIUM 5000 UNITS: 5000 INJECTION INTRAVENOUS; SUBCUTANEOUS at 06:05

## 2021-06-18 NOTE — TELEPHONE ENCOUNTER
Just spoke w/ DTR Giovanna pt is in [place] since last night but did not get a bed yet  DTR will still like a video appt on Monday  Please advise

## 2021-06-18 NOTE — ASSESSMENT & PLAN NOTE
· Presents with worsening weakness with assisted fall earlier tonight  Denies head strike or LOC  Per ED notes, daughter is looking to have patient placed in an assisted living facility  · Consult PT/OT   · CT head with meningioma w/o other abnormalities   · Lab work appears unremarkable   · Was being treated for UTI in the outpatient setting which she finished keflex  · Urine with 4-10 WBCs with moderate bacteria  Also noted moderate epithelial cells    · Will hold on antibiotics for now  · Consult case management for help with placement

## 2021-06-18 NOTE — ED NOTES
Patient eating at this time  RN waiting for remainder of medications to come from pharmacy        Emilee Gallardo RN  06/18/21 5401

## 2021-06-18 NOTE — ED NOTES
Possible EKG changes noted on cardiac monitor at this time  Patient is awake and alert, no somatic complaints  EKG printed and showed to provider immediately  No further actions or orders written at this time  Will continue to monitor        Jimbo Marinelli RN  06/18/21 0630

## 2021-06-18 NOTE — PHYSICAL THERAPY NOTE
PHYSICAL THERAPY EVALUATION NOTE  Patient Name: Dorie Moscoso  [id number] Date: 2021     AGE:   77 y o  Mrn:   [id number]  ADMIT DX:  UTI (urinary tract infection) [N39 0]  Altered mental status [R41 82]  High blood sugar [R73 9]  Dementia (HCC) [F03 90]  Raised TSH level [R79 89]  History of recurrent UTIs [Z87 440]  Ambulatory dysfunction [R26 2]  AMS (altered mental status) [R41 82]    Past Medical History:   Diagnosis Date    Anxiety     Cholesterol depletion     Confusion     [facility] ([email or web address]     Depression     Diabetes ([email or web address] )     Early onset Alzheimer's dementia ([email or web address] )     Glaucoma     Hypertension     Hypertension     Memory loss      Length Of Stay: 0  PHYSICAL THERAPY EVALUATION :   Patient's identity confirmed via 2 patient identifiers (full name and ) at start of session       21 1450   PT Last Visit   PT Visit Date 21   Note Type   Note type Evaluation   Pain Assessment   Pain Assessment Tool FLACC   Pain Rating: FLACC (Rest) - Face 0   Pain Rating: FLACC (Rest) - Legs 0   Pain Rating: FLACC (Rest) - Activity 0   Pain Rating: FLACC (Rest) - Cry 0   Pain Rating: FLACC (Rest) - Consolability 0   Score: FLACC (Rest) 0   Pain Rating: FLACC (Activity) - Face 1   Pain Rating: FLACC (Activity) - Legs 1   Pain Rating: FLACC (Activity) - Activity 1   Pain Rating: FLACC (Activity) - Cry 1   Pain Rating: FLACC (Activity) - Consolability 1   Score: FLACC (Activity) 5   Home Living   Type of Home House   Home Layout One level;Stairs to enter with rails  (3 ASA)   Home Equipment Walker   Additional Comments Pt only oriented to name, unable to provide social history   Social obtained via CM chart review   Prior Function   Level of [place] Needs assistance with IADLs   Lives With Daughter  (& CHANDNI)   Receives Help From Family   ADL Assistance Needs assistance   IADLs Needs assistance   Falls in the last 6 months 1 to 4  (at least 1 per chart review)   Comments Pt resides w/ dtr and CHANDNI  Per chart review, pt is usually I w/ RW for mobility but has required increased A, ultimately leading to a controlled fall just PTA  Pt was able to perform ADLs w/ cues due to dementia, but now has been requiring max A and does not eat unless daughter feeds het   Restrictions/Precautions   Weight Bearing Precautions Per Order No   Other Precautions Cognitive; Bed Alarm;Multiple lines; Fall Risk;Pain  ([place], IV)   General   Family/Caregiver Present No   Cognition   Overall Cognitive Status Impaired   Arousal/Participation Lethargic   Orientation Level Oriented to person;Disoriented to place; Disoriented to time;Disoriented to situation   Memory Decreased recall of biographical information;Decreased short term memory;Decreased recall of recent events;Decreased recall of precautions   Following Commands Follows one step commands inconsistently   Comments Pt lethargic and confused, hallucainates "little boys playing in the corner", most of conversation does not make sense   RLE Assessment   RLE Assessment WFL   Strength RLE   RLE Overall Strength   (at least 3/5 functionally observed)   LLE Assessment   LLE Assessment WFL   Strength LLE   LLE Overall Strength   (at least 3/5 functionally observed)   Bed Mobility   Rolling R 2  Maximal assistance   Additional items Assist x 1; Increased time required;Verbal cues   Supine to Sit 2  Maximal assistance   Additional items Assist x 1; Increased time required;Verbal cues   Additional Comments Pt sat EOB x 6 min w/ inital poor - and actively resisting therapist, however pt also ?falling asleep  When alert, able to maintain w/ poor +  Transfers   Sit to Stand 2  Maximal assistance   Additional items Assist x 2; Increased time required;Verbal cues   Stand to Sit 2  Maximal assistance   Additional items Assist x 2; Increased time required;Verbal cues   Additional Comments Pt is able to just clear bottom from bed before requesting to sit back down again   Ambulation/Elevation   Gait pattern Not appropriate  (limited standing tolerance)   Balance   Static Sitting   (Inconsistent Poor - <> Poor +)   Static Standing Zero  (Ax2)   Activity Tolerance   Activity Tolerance Patient limited by fatigue; Other (Comment)  (decreased cognition and poor command following)   Medical Staff Made Aware Spoke to [address] [address],  [place]   Nurse Made Aware Spoke to RN Nathaly Dow, RN Sanjana   Assessment   Prognosis Fair   Problem List Decreased strength;Decreased endurance; Impaired balance;Decreased cognition; Impaired judgement;Decreased safety awareness;Pain   Assessment Anastasiya Carbajal is a 77 y o  Female who [facility] on 6/17/2021 from home w/ c/o FTT and diagnosis of ambulatory dysfunction  Orders for PT eval and treat received, w/ activity orders of up w/ A and fall precautions  Pt presents w/ comorbidities of Alzheimer's Dementia, HTN, glaucoma, CKD Stage 4, DMII and personal factors including: advanced age, mobilizing w/ assistive device, stair(s) to enter home, decreased cognition, positive fall history, anxiety, depression and inability to perform IADLs  At baseline, pt mobilizes independently w/ RW, and reports at least 1 falls in the last 6 months  Upon evaluation, pt presents w/ the following deficits: weakness, impaired balance, decreased endurance, pain limiting functional mobility and decreased cognition  Pt requires max A x 1 for bed mobility, max A x 2 for transfers  Pt's clinical presentation is unstable/unpredictable due to need for assist w/ all phases of mobility when usually mobilizing independently, need for input for task focus and mobility technique, recent drastic decline in mobility compared to baseline, recent history of falls and decreased cognition impacting overall mobility status  Pt is at an increased risk of falls due to physical, safety awareness, and cognitive deficits   Given the above findings, discharge recommendation is for Post-acute inpatient rehabilitation  During this admission, pt would benefit from skilled acute inpatient PT in order to address the abovementioned deficits to maximize function and mobility before DC from acute care  Goals   Patient Goals pt unable to express, will continue to assess   STG Expiration Date 06/28/21   Short Term Goal #1 Patient will: Increase bilateral LE strength 1/2 grade to facilitate independent mobility, Perform all bed mobility tasks w/ minx1 to decrease fall risk factors, Perform all transfers w/ modx1 to improve independence, Increase all balance 1/2 grade to decrease risk for falls, Complete exercise program independently, Tolerate 3 hr OOB to faciliate upright tolerance and PT to assess ambulation when appropriate   PT Treatment Day 1  (see treatment note below)   Plan   Treatment/Interventions Functional transfer training;LE strengthening/ROM; Therapeutic exercise; Endurance training;Cognitive reorientation;Patient/family training;Equipment eval/education; Bed mobility   PT Frequency Other (Comment)  (3-5x/wk)   Recommendation   PT Discharge Recommendation Post acute rehabilitation services   Additional Comments Pt may be appropriate for Joao Carballo, PT will continue to assess   AM-[facility] Basic Mobility Inpatient   Turning in Bed Without Bedrails 2   Lying on Back to Sitting on Edge of Flat Bed 2   Moving Bed to Chair 1   Standing Up From Chair 1   Walk in Room 1   Climb 3-5 Stairs 1   Basic Mobility Inpatient Raw Score 8   Turning Head Towards Sound 2   Follow Simple Instructions 2   Low Function Basic Mobility Raw Score 12   Low Function Basic Mobility Standardized Score 18 33     The patient's AM-[facility] Basic Mobility Inpatient Short Form Low Function Raw Score 12 , Standardized Score is 18 33  A standardized score less 42 9 suggests the patient may benefit from discharge to post-acute rehab services   Please also refer to the recommendation of the Physical Therapist for safe discharge planning  PHYSICAL THERAPY TREATMENT NOTE    Name: Flori Avery  : 1945  Time in: 1442  Time out: 1450  Total treat time: 8 min    S: Pt seated EOB w/ RN Sanjana and PCA present  Continues to be confused  O: Therapist provided R knee block, placed pt's BUEs on RW for tactile cues, BUE support, and potential assistance to convey directions to pt (secondary to her increased confusion/dx of dementia)  Instructed pt to stand  Pt is able to achieve a full stand w/ max A x 2 for ~ 35s w/ BUE support of RW before asking to sit  Pt w/ some mild retropulsion  Pt returned EOB and able to sit w/ poor + balance  Pt required max A x 2 to return sit -> supine, and total A x 2 to reposition towards HOB  Pt supine in bed w/ bed alarm activated, call bell and room phone within reach w/ all needs met  A: Pt continues to be confused  Benefits from tactile cues and use of RW to assist w/ command following, but still inconsistent  She requires max A x 2 and is able to achieve a stand for ~ 30s before requesting to sit  She requires max A x 2 to lay back down, but the increase in assistance may also be due to decreased cognition and command following       P: Continue per evaluation above    Skilled inpatient PT is recommended during this admission in order to progress patient toward goals so patient is able to maximize independence    Yusuf Esquivel, PT

## 2021-06-18 NOTE — CASE MANAGEMENT
CM made aware by therapy team that after their evaluation, their recommendation would be post acute rehabilitation services  CM called and spoke to daughter Efren Nash via phone to discuss same, daughter reports she would be on board with patient going to [facility] and do whatever needed to help patient improve and get stronger  CM discussed freedom of choice in facilities and daughter reports she would like her to stay in [place] or close to the area  CM discussed making blanket referral to all facilities within 10 miles of patient's zip code [address] [place] [address] to see what options there are in accepting facilities and go from there  Daughter would like CM to send the blanket referral  [place] referral sent  CM will continue to follow for further care coordination needs

## 2021-06-18 NOTE — H&P
[facility]  H&P- Dorie Heal 1945, 77 y o  female MRN: [id number]  Unit/Bed#: ED 27 Encounter: [id number]  Primary Care Provider: Feliciano Garcia MD   Date and time admitted to hospital: 6/17/2021 11:09 PM    * Ambulatory dysfunction  Assessment & Plan  · Presents with worsening weakness with assisted fall earlier tonight  Denies head strike or LOC  Per ED notes, daughter is looking to have patient placed in an assisted living facility  · Consult PT/OT   · CT head with meningioma w/o other abnormalities   · Lab work appears unremarkable   · Was being treated for UTI in the outpatient setting which she finished keflex  · Urine with 4-10 WBCs with moderate bacteria  Also noted moderate epithelial cells  · Will hold on antibiotics for now  · Consult case management for help with placement    Late onset Alzheimer's disease without behavioral disturbance ([facility] )  Assessment & Plan  · Noted history   · Supportive care     CKD (chronic kidney disease) stage 4, GFR 15-29 ml/min [facility])  Assessment & Plan  Lab Results   Component Value Date    EGFR 32 06/18/2021    EGFR 34 06/10/2021    EGFR 28 08/11/2020    CREATININE 1 75 (H) 06/18/2021    CREATININE 1 67 (H) 06/10/2021    CREATININE 1 97 (H) 08/11/2020     · Renal function appears to be at baseline  · Continue to trend metabolic panels daily    Type 2 diabetes mellitus with stage 4 chronic kidney disease and hypertension [facility])  Assessment & Plan    Lab Results   Component Value Date    HGBA1C 5 7 (H) 06/10/2021     · Well controlled  · Hold Tradjenta start sliding scale insulin  · Accu-Cheks q i d  Essential hypertension  Assessment & Plan  · Initially elevated now stable  · Continue home medications including Lasix 20 mg b i d , hydralazine 25 mg t i d , losartan 25 mg daily, and amlodipine 10 mg     VTE Pharmacologic Prophylaxis: VTE Score: 4 Moderate Risk (Score 3-4) - Pharmacological DVT Prophylaxis Ordered: heparin    Code Status: No Order full   Discussion with family: Attempted to update  (daughter) via phone  Left voicemail  Anticipated Length of Stay: Patient will be admitted on an inpatient basis with an anticipated length of stay of greater than 2 midnights secondary to ambulatory dysfunction   Total Time for Visit, including Counseling / Coordination of Care: 70 minutes Greater than 50% of this total time spent on direct patient counseling and coordination of care  Chief Complaint:  Ambulatory dysfunction    History of Present Illness:  Yobani Lau is a 77 y o  female with a PMH of out's I MERS dementia, diabetes, hypertension who presents with ambulatory dysfunction  Patient has significant history for Alzheimer's dementia and is unable to provide history so most of this is taken from ED provider and associated nodes  Patient had assisted fall earlier today and over the past couple of days has been having increasing weakness with associated ambulatory dysfunction  Patient's family is unable to care for her at home and is requesting possible placement for patient after her acute hospitalization  Patient herself offers no complaints down stairs  Review of Systems:  Review of Systems   Unable to perform ROS: Dementia       Past Medical and Surgical History:   Past Medical History:   Diagnosis Date    Anxiety     Cholesterol depletion     Confusion     Dementia ([facility] )     Depression     Diabetes ([facility] )     Early onset Alzheimer's dementia ([facility] )     Glaucoma     Hypertension     Hypertension     Memory loss        Past Surgical History:   Procedure Laterality Date    CYSTOSCOPY  06/10/2021    HYSTERECTOMY         Meds/Allergies:  Prior to Admission medications    Medication Sig Start Date End Date Taking?  Authorizing Provider   amLODIPine (NORVASC) 10 mg tablet TAKE 1 TABLET BY MOUTH EVERY DAY 3/24/21   Nanette Upton MD   Ascorbic Acid (VITAMIN C) 100 MG tablet Take 100 mg by mouth daily    Historical Provider, MD   aspirin (ECOTRIN LOW STRENGTH) 81 mg EC tablet Take 81 mg by mouth daily    Historical Provider, MD   atorvastatin (LIPITOR) 40 mg tablet Take 1 tablet (40 mg total) by mouth daily 12/31/20   Greyson Lopez MD   Azopt 1 % ophthalmic suspension  10/1/20   Historical Provider, MD   Brimonidine Tartrate-Timolol (COMBIGAN OP) Apply to eye    Historical Provider, MD   buPROPion [facility] SR) 150 mg 12 hr tablet Take 1 tablet (150 mg total) by mouth 2 (two) times a day 11/18/20   Glenroy Bermudez MD   Calcifediol ER (Rayaldee) 30 MCG CPCR Take 30 mcg by mouth daily 7/30/20   Daja Nicholson MD   cephalexin (KEFLEX) 500 mg capsule Take 500 mg by mouth every 6 (six) hours    Historical Provider, MD   cetirizine (ZyrTEC) 10 mg tablet Take 10 mg by mouth daily    Historical Provider, MD   citalopram (CeleXA) 40 mg tablet Take 1 tablet (40 mg total) by mouth daily 11/18/20   Glenroy Bermudez MD   diclofenac sodium (VOLTAREN) 1 % Apply 2 g topically 3 (three) times a day as needed (Pain) 9/22/20   Marleny Wan DO   Docusate Sodium (COLACE PO) Take by mouth    Historical Provider, MD   donepezil (ARICEPT) 10 mg tablet Take 1 tablet (10 mg total) by mouth daily at bedtime 11/18/20   Glenroy Bermudez MD   dorzolamide (TRUSOPT) 2 % ophthalmic solution 1 drop 3 (three) times a day    Historical Provider, MD   ferrous sulfate 325 (65 Fe) mg tablet Take 325 mg by mouth daily with breakfast    Historical Provider, MD   fluticasone (FLONASE) 50 mcg/act nasal spray 1 spray into each nostril daily 5/18/21   Nanette Upton MD   furosemide (LASIX) 20 mg tablet Take 20 mg by mouth 2 (two) times a day    Historical Provider, MD   hydrALAZINE (APRESOLINE) 25 mg tablet TAKE 1 TABLET BY MOUTH THREE TIMES A DAY 5/17/21   Nanette Upton MD   Latanoprostene Bunod (Vyzulta) 0 024 % SOLN Apply to eye    Historical Provider, MD   losartan (COZAAR) 25 mg tablet Take 25 mg by mouth daily    Historical Provider, MD   meclizine (ANTIVERT) 25 mg tablet Take 1 tablet (25 mg total) by mouth 2 (two) times a day 20   Vishnu Guerrero MD   memantine (NAMENDA) 10 mg tablet TAKE 1 TABLET BY MOUTH TWICE A DAY 3/8/21   Tristan Calvin MD   Multiple Vitamins-Minerals (CENTRUM SILVER PO) Take by mouth    Historical Provider, MD   nitrofurantoin (MACROBID) 100 mg capsule Take 1 capsule (100 mg total) by mouth 2 (two) times a day 20   Nanette Upton MD   omeprazole (PriLOSEC) 20 mg delayed release capsule Take 20 mg by mouth daily    Historical Provider, MD   risperiDONE (RisperDAL) 0 25 mg tablet TAKE 1 TABLET (0 25 MG TOTAL) BY MOUTH 2 (TWO) TIMES A DAY 21   Tristan Calvin MD   Tradjenta 5 MG TABS TAKE 1 TABLET DAILY 21   Vishnu Guerrero MD   traZODone (DESYREL) 50 mg tablet Take 1 tablet (50 mg total) by mouth daily at bedtime 20   Tristan Calvin MD     I have reveiwed home medications using records provided by [facility]  Allergies:    Allergies   Allergen Reactions    Codeine        Social History:  Marital Status: Single   Occupation: unknown   Patient Pre-hospital Living Situation: Home  Patient Pre-hospital Level of Mobility: unable to be assessed at time of evaluation  Patient Pre-hospital Diet Restrictions: carb controlled   Substance Use History:   Social History     Substance and Sexual Activity   Alcohol Use Not Currently     Social History     Tobacco Use   Smoking Status Former Smoker    Types: Cigarettes    Quit date: 2018    Years since quittin 8   Smokeless Tobacco Never Used     Social History     Substance and Sexual Activity   Drug Use Never       Family History:  Family History   Problem Relation Age of Onset    Diabetes Mother     Hypertension Mother        Physical Exam:     Vitals:   Blood Pressure: 156/62 (21 0300)  Pulse: 94 (21 0300)  Temperature: 98 2 °F (36 8 °C) (21 0050)  Temp Source: Oral (21 0050)  Respirations: 20 (21 0300)  SpO2: 96 % (06/18/21 0300)    Physical Exam  Constitutional:       General: She is not in acute distress  Appearance: She is not diaphoretic  HENT:      Head: Normocephalic and atraumatic  Mouth/Throat:      Mouth: Mucous membranes are moist       Pharynx: Oropharynx is clear  No oropharyngeal exudate  Eyes:      General:         Right eye: No discharge  Left eye: No discharge  Conjunctiva/sclera: Conjunctivae normal       Pupils: Pupils are equal, round, and reactive to light  Cardiovascular:      Rate and Rhythm: Normal rate and regular rhythm  Pulses: Normal pulses  Heart sounds: Normal heart sounds  No murmur heard  Pulmonary:      Effort: Pulmonary effort is normal  No respiratory distress  Breath sounds: Normal breath sounds  No wheezing or rales  Abdominal:      General: Abdomen is flat  There is no distension  Palpations: Abdomen is soft  Tenderness: There is no abdominal tenderness  Musculoskeletal:         General: Normal range of motion  Cervical back: Neck supple  No muscular tenderness  Right lower leg: No edema  Left lower leg: No edema  Skin:     General: Skin is warm and dry  Capillary Refill: Capillary refill takes less than 2 seconds  Coloration: Skin is not jaundiced  Neurological:      General: No focal deficit present  Mental Status: She is alert  She is disoriented  Cranial Nerves: No cranial nerve deficit  Motor: No weakness        Comments: Pleasantly confused   Psychiatric:         Mood and Affect: Mood normal          Additional Data:     Lab Results:  Results from last 7 days   Lab Units 06/18/21  0057   WBC Thousand/uL 8 36   HEMOGLOBIN g/dL 11 6   HEMATOCRIT % 36 1   PLATELETS Thousands/uL 300   NEUTROS PCT % 83*   LYMPHS PCT % 8*   MONOS PCT % 7   EOS PCT % 0     Results from last 7 days   Lab Units 06/18/21  0057   SODIUM mmol/L 142   POTASSIUM mmol/L 4 4   CHLORIDE mmol/L 107 CO2 mmol/L 23   BUN mg/dL 23   CREATININE mg/dL 1 75*   ANION GAP mmol/L 12   CALCIUM mg/dL 10 2*   ALBUMIN g/dL 3 5   TOTAL BILIRUBIN mg/dL 0 58   ALK PHOS U/L 116   ALT U/L 18   AST U/L 19   GLUCOSE RANDOM mg/dL 141*     Results from last 7 days   Lab Units 06/18/21  0057   INR  0 99     Results from last 7 days   Lab Units 06/18/21  0106   POC GLUCOSE mg/dl 140         Results from last 7 days   Lab Units 06/18/21  0057   LACTIC ACID mmol/L 0 9       Imaging: Reviewed radiology reports from this admission including: CT head  XR abdomen obstruction series   ED Interpretation by Natali Watters MD (06/18 9854)   No acute disease; increased stool; NSBGP; no obstruction or free air read by me  CT head without contrast   ED Interpretation by Natali Watters MD (06/18 9811)   FINDINGS:     PARENCHYMA:  There is an approximately 6 x 9 x 7 mm hyperdensity along the right hand aspect of the posterior falx (series 2 image 28, coronal series 400 image 74)  A small calcification is present along the falx in this area  This may represent a   small meningioma  Short-term follow-up is recommended in order to ensure stability  There is no evidence of significant associated edema within the adjacent brain  There is no midline shift  No CT signs of acute infarction  There is central atrophy  Moderate microangiopathic changes are present      VENTRICLES AND EXTRA-AXIAL SPACES:  Ventricles and extra-axial CSF spaces are prominent commensurate with the degree of volume loss  No hydrocephalus        VISUALIZED ORBITS AND PARANASAL SINUSES:  Unremarkable      CALVARIUM AND EXTRACRANIAL SOFT TISSUES:  Normal      IMPRESSION:     There is an approximately 6 x 9 x 7 mm hyperdensity along the right hand aspect of the posterior falx, as described above  Please see d   iscussion  This may represent a small meningioma  Short-term follow-up is recommended in order to ensure stability      Atrophy    Moderate microangiopathic change      This examination demonstrates findings for which imaging follow-up is recommended and was logged as such in [address] [place] [address] Rd            I personally discussed this study with [address] [place] [address] on 6/18/2021 at 3:45 AM                           Workstation performed: [id number]         Final Result by Kenna Moreau MD (06/18 0353)      There is an approximately 6 x 9 x 7 mm hyperdensity along the right hand aspect of the posterior falx, as described above  Please see discussion  This may represent a small meningioma  Short-term follow-up is recommended in order to ensure stability  Atrophy  Moderate microangiopathic change  This examination demonstrates findings for which imaging follow-up is recommended and was logged as such in [address] [place] [address] Rd  I personally discussed this study with [address] [place] [address] on 6/18/2021 at 3:45 AM                            Workstation performed: [id number]             EKG and Other Studies Reviewed on Admission:   · EKG: No EKG obtained  ** Please Note: This note has been constructed using a voice recognition system   **

## 2021-06-18 NOTE — ASSESSMENT & PLAN NOTE
· Initially elevated now stable    · Continue home medications including Lasix 20 mg b i d , hydralazine 25 mg t i d , losartan 25 mg daily, and amlodipine 10 mg

## 2021-06-18 NOTE — CASE MANAGEMENT
Admitted today, not a bundle or readmission  Patient is a low risk for readmission with risk for readmission score of 18  CM consulted for post acute placement  Patient is not alert and oriented x 4  CM called daughter Estephanie De La Torre and spoke to her and patient's natalya Washington via conference call to introduce self, explain role, complete CM assessment, and discuss DC planning  Patient resides with daughter and daughter's spouse in a ranch home with 3 ASA  Patient was able to independent with use of a walker until the last 5-6 days where she has been requiring moderate to maximum assistance of one person in addition to walker to ambulate more recently  Patient in the last year has declined in capability to perform ADLs from requiring consistent verbal cues and redirection with minimal assistance to becoming maximum assistance  Daughter reports patient will not eat unless patient feeds her  Daughter performs all IADLs including medication management, cooking, cleaning, and providing all transportation  Hx [facility] when patient resided in [facility] with natalya Washington  No Hx STR  No LW or POA, declined CM offer for information at this time, Estephanie De La Torre reports she is the primary point of contact, then natalya Washington as first alternate  Patient has MH diagnoses of MDD, anxiety, and Bipolar disorder  Patient is on medications managed by Dr Mickey Lopez whom she sees about every six months  No IP stays  No Hx substance use  PCP is Dr Emily Campuzano, patient has prescription coverage, and prefers using [facility] for medications  CM discussed DC plan upon medical stability as per chart review, family expressed interest in placing patient in a facility  Family reports they want to do whatever is in patient's best interest, providing her the best quality of life  Family would be interested in [address] for patient if recommended, and would like to await therapy evaluations prior to making a decision      Family reports no additional concerns from CM standpoint at this time  CM encouraged family to reach out with any questions or concerns  CM will continue to follow for further care coordination needs throughout hospital stay

## 2021-06-18 NOTE — ED PROVIDER NOTES
History  Chief Complaint   Patient presents with    Failure To Thrive     as per ems 7-10 days daughter noticed declined, hx of dementia, daughter wants her to be evaluated for placement, , also fell from standing position -head strike, -loc, -thinners     Patient is a 77year old female with increased weakness and fatigue for past 1 week and not being able to walk for past few days  No BM for past 4 days  No vomiting  No fever  No cough  Was gently lowered to the ground tonight when she could not stand or walk  Recently finished abx for UTI  Patient has dementia and has had worsening mental status over past several days as well  No recent old records from this ED seen on computer system  [facility] website checked on this patient and no Rx found  Has had covid 19 vaccine  History provided by:  EMS personnel and relative (daughter)  History limited by:  Dementia   used: No        Prior to Admission Medications   Prescriptions Last Dose Informant Patient Reported? Taking?    Ascorbic Acid (VITAMIN C) 100 MG tablet  Child Yes No   Sig: Take 100 mg by mouth daily   Azopt 1 % ophthalmic suspension  Child Yes No   Brimonidine Tartrate-Timolol (COMBIGAN OP)  Child Yes No   Sig: Apply to eye   Calcifediol ER ([place]) 30 MCG CPCR  Child No No   Sig: Take 30 mcg by mouth daily   Docusate Sodium (COLACE PO)  Child Yes No   Sig: Take by mouth   Latanoprostene Bunod (Vyzulta) 0 024 % SOLN  Child Yes No   Sig: Apply to eye   Multiple Vitamins-Minerals (CENTRUM SILVER PO)  Child Yes No   Sig: Take by mouth   Tradjenta 5 MG TABS  Child No No   Sig: TAKE 1 TABLET DAILY   amLODIPine (NORVASC) 10 mg tablet  Child No No   Sig: TAKE 1 TABLET BY MOUTH EVERY DAY   aspirin (ECOTRIN LOW STRENGTH) 81 mg EC tablet  Child Yes No   Sig: Take 81 mg by mouth daily   atorvastatin (LIPITOR) 40 mg tablet  Child No No   Sig: Take 1 tablet (40 mg total) by mouth daily   buPROPion (WELLBUTRIN SR) 150 mg 12 hr tablet Child No No   Sig: Take 1 tablet (150 mg total) by mouth 2 (two) times a day   cephalexin (KEFLEX) 500 mg capsule  Child Yes No   Sig: Take 500 mg by mouth every 6 (six) hours   cetirizine (ZyrTEC) 10 mg tablet  Child Yes No   Sig: Take 10 mg by mouth daily   citalopram (CeleXA) 40 mg tablet  Child No No   Sig: Take 1 tablet (40 mg total) by mouth daily   diclofenac sodium (VOLTAREN) 1 %  Child No No   Sig: Apply 2 g topically 3 (three) times a day as needed (Pain)   donepezil (ARICEPT) 10 mg tablet  Child No No   Sig: Take 1 tablet (10 mg total) by mouth daily at bedtime   dorzolamide (TRUSOPT) 2 % ophthalmic solution  Child Yes No   Si drop 3 (three) times a day   ferrous sulfate 325 (65 Fe) mg tablet  Child Yes No   Sig: Take 325 mg by mouth daily with breakfast   fluticasone (FLONASE) 50 mcg/act nasal spray  Child No No   Si spray into each nostril daily   furosemide (LASIX) 20 mg tablet  Child Yes No   Sig: Take 20 mg by mouth 2 (two) times a day   hydrALAZINE (APRESOLINE) 25 mg tablet  Child No No   Sig: TAKE 1 TABLET BY MOUTH THREE TIMES A DAY   losartan (COZAAR) 25 mg tablet  Child Yes No   Sig: Take 25 mg by mouth daily   meclizine (ANTIVERT) 25 mg tablet  Child No No   Sig: Take 1 tablet (25 mg total) by mouth 2 (two) times a day   memantine (NAMENDA) 10 mg tablet  Child No No   Sig: TAKE 1 TABLET BY MOUTH TWICE A DAY   nitrofurantoin (MACROBID) 100 mg capsule  Child No No   Sig: Take 1 capsule (100 mg total) by mouth 2 (two) times a day   omeprazole (PriLOSEC) 20 mg delayed release capsule  Child Yes No   Sig: Take 20 mg by mouth daily   risperiDONE (RisperDAL) 0 25 mg tablet  Child No No   Sig: TAKE 1 TABLET (0 25 MG TOTAL) BY MOUTH 2 (TWO) TIMES A DAY   traZODone (DESYREL) 50 mg tablet  Child No No   Sig: Take 1 tablet (50 mg total) by mouth daily at bedtime      Facility-Administered Medications: None       Past Medical History:   Diagnosis Date    Anxiety     Cholesterol depletion     Confusion     D[facility]     Depression     [facility]     Early onset Alzheimer's dementia ([facility] )     Glaucoma     Hypertension     Hypertension     Memory loss        Past Surgical History:   Procedure Laterality Date    CYSTOSCOPY  06/10/2021    HYSTERECTOMY         Family History   Problem Relation Age of Onset    Diabetes Mother     Hypertension Mother      I have reviewed and agree with the history as documented  E-Cigarette/Vaping    E-Cigarette Use Never User      E-Cigarette/Vaping Substances     Social History     Tobacco Use    Smoking status: Former Smoker     Types: Cigarettes     Quit date: 2018     Years since quittin 8    Smokeless tobacco: Never Used   Vaping Use    Vaping Use: Never used   Substance Use Topics    Alcohol use: Not Currently    Drug use: Never       Review of Systems   Unable to perform ROS: Dementia   Constitutional: Positive for fatigue  Negative for fever  Gastrointestinal: Positive for constipation  Negative for vomiting  Neurological: Positive for weakness  Psychiatric/Behavioral: Positive for confusion (worsening)  Physical Exam  Physical Exam  Vitals and nursing note reviewed  Constitutional:       General: She is in acute distress (moderate)  Comments: Awake     HENT:      Head: Normocephalic and atraumatic  Cardiovascular:      Rate and Rhythm: Normal rate and regular rhythm  Heart sounds: Normal heart sounds  No murmur heard  Pulmonary:      Effort: Pulmonary effort is normal  No respiratory distress  Breath sounds: Normal breath sounds  No stridor  No wheezing, rhonchi or rales  Abdominal:      General: Bowel sounds are normal  There is no distension  Palpations: Abdomen is soft  Tenderness: There is no abdominal tenderness  Musculoskeletal:         General: No deformity  Cervical back: Normal range of motion and neck supple  Right lower leg: No edema        Left lower leg: No edema    Skin:     General: Skin is warm and dry  Findings: No rash  Neurological:      Comments: Awake  Confused  No gross focal deficits      Psychiatric:         Mood and Affect: Mood normal          Vital Signs  ED Triage Vitals   Temperature Pulse Respirations Blood Pressure SpO2   06/18/21 0050 06/17/21 2314 06/17/21 2314 06/17/21 2314 06/17/21 2314   98 2 °F (36 8 °C) 95 18 (!) 173/84 97 %      Temp Source Heart Rate Source Patient Position - Orthostatic VS BP Location FiO2 (%)   06/18/21 0050 06/17/21 2314 06/17/21 2314 06/17/21 2314 --   Oral Monitor Lying Right arm       Pain Score       --                  Vitals:    06/17/21 2314 06/18/21 0129 06/18/21 0215 06/18/21 0300   BP: (!) 173/84 131/69 141/65 156/62   Pulse: 95 100 96 94   Patient Position - Orthostatic VS: Lying Lying Lying Lying         Visual Acuity  Visual Acuity      Most Recent Value   L Pupil Size (mm)  3   R Pupil Size (mm)  3          ED Medications  Medications   sodium chloride 0 9 % infusion (125 mL/hr Intravenous New Bag 6/18/21 0110)   sodium chloride 0 9 % bolus 250 mL (0 mL Intravenous Stopped 6/18/21 0253)   ceftriaxone (ROCEPHIN) 1 g/50 mL in dextrose IVPB (1,000 mg Intravenous New Bag 6/18/21 0252)       Diagnostic Studies  Results Reviewed     Procedure Component Value Units Date/Time    Urine Microscopic [[id number]]  (Abnormal) Collected: 06/18/21 0121    Lab Status: Final result Specimen: Urine, Straight Cath Updated: 06/18/21 0213     RBC, UA 4-10 /hpf      WBC, UA 4-10 /hpf      Epithelial Cells Moderate /hpf      Bacteria, UA Moderate /hpf     UA (URINE) with reflex to Scope [[id number]]  (Abnormal) Collected: 06/18/21 0121    Lab Status: Final result Specimen: Urine, Straight Cath Updated: 06/18/21 0153     Color, UA Yellow     Clarity, UA Clear     Specific Gravity, UA >=1 030     pH, UA 6 0     Leukocytes, UA Negative     Nitrite, UA Negative     Protein, UA 30 (1+) mg/dl      Glucose, UA Negative mg/dl Ketones, UA Negative mg/dl      Urobilinogen, UA 1 0 E U /dl      Bilirubin, UA Negative     Blood, UA Negative    Urine culture [[id number]] Collected: 06/18/21 0121    Lab Status: In process Specimen: Urine, Straight Cath Updated: 06/18/21 0141    TSH, 3rd generation with Free T4 reflex [[id number]]  (Abnormal) Collected: 06/18/21 0057    Lab Status: Final result Specimen: Blood from Arm, Left Updated: 06/18/21 0135     TSH 3RD GENERATON 5 162 uIU/mL     Narrative:      Patients undergoing fluorescein dye angiography may retain small amounts of fluorescein in the body for 48-72 hours post procedure  Samples containing fluorescein can produce falsely depressed TSH values  If the patient had this procedure,a specimen should be resubmitted post fluorescein clearance  High sensitivity CRP [[id number]] Collected: 06/18/21 0057    Lab Status: Final result Specimen: Blood from Arm, Left Updated: 06/18/21 0135     CRP, High Sensitivity 1 65 mg/L     Narrative:            HsCRP Level       Relative Risk           <1 0 mg/L          Low           1 0 to 3 0 mg/L    Average           >3 0 mg/L          High        CK Total with Reflex CKMB [[id number]]  (Normal) Collected: 06/18/21 0057    Lab Status: Final result Specimen: Blood from Arm, Left Updated: 06/18/21 0135     Total CK 91 U/L     T4, free [[id number]] Collected: 06/18/21 0057    Lab Status: In process Specimen: Blood from Arm, Left Updated: 06/18/21 0135    Ammonia [[id number]]  (Abnormal) Collected: 06/18/21 0057    Lab Status: Final result Specimen: Blood from Arm, Left Updated: 06/18/21 0134     Ammonia <10 umol/L     Lactic acid [[id number]]  (Normal) Collected: 06/18/21 0057    Lab Status: Final result Specimen: Blood from Arm, Left Updated: 06/18/21 0127     LACTIC ACID 0 9 mmol/L     Narrative:      Result may be elevated if tourniquet was used during collection      Troponin I [[id number]]  (Normal) Collected: 06/18/21 0057    Lab Status: Final result Specimen: Blood from Arm, Left Updated: 06/18/21 0126     Troponin I <0 02 ng/mL     Protime-INR [[id number]]  (Normal) Collected: 06/18/21 0057    Lab Status: Final result Specimen: Blood from Arm, Left Updated: 06/18/21 0123     Protime 13 2 seconds      INR 0 99    APTT [[id number]]  (Normal) Collected: 06/18/21 0057    Lab Status: Final result Specimen: Blood from Arm, Left Updated: 06/18/21 0123     PTT 25 seconds     Comprehensive metabolic panel [[id number]]  (Abnormal) Collected: 06/18/21 0057    Lab Status: Final result Specimen: Blood from Arm, Left Updated: 06/18/21 0123     Sodium 142 mmol/L      Potassium 4 4 mmol/L      Chloride 107 mmol/L      CO2 23 mmol/L      ANION GAP 12 mmol/L      BUN 23 mg/dL      Creatinine 1 75 mg/dL      Glucose 141 mg/dL      Calcium 10 2 mg/dL      AST 19 U/L      ALT 18 U/L      Alkaline Phosphatase 116 U/L      Total Protein 6 7 g/dL      Albumin 3 5 g/dL      Total Bilirubin 0 58 mg/dL      eGFR 32 ml/min/1 73sq m     Narrative:      [place] guidelines for Chronic Kidney Disease (CKD):     Stage 1 with normal or high GFR (GFR > 90 mL/min/1 73 square meters)    Stage 2 Mild CKD (GFR = 60-89 mL/min/1 73 square meters)    Stage 3A Moderate CKD (GFR = 45-59 mL/min/1 73 square meters)    Stage 3B Moderate CKD (GFR = 30-44 mL/min/1 73 square meters)    Stage 4 Severe CKD (GFR = 15-29 mL/min/1 73 square meters)    Stage 5 End Stage CKD (GFR <15 mL/min/1 73 square meters)  Note: GFR calculation is accurate only with a steady state creatinine    Fingerstick Glucose (POCT) [[id number]]  (Normal) Collected: 06/18/21 0106    Lab Status: Final result Updated: 06/18/21 0107     POC Glucose 140 mg/dl     CBC and differential [[id number]]  (Abnormal) Collected: 06/18/21 0057    Lab Status: Final result Specimen: Blood from Arm, Left Updated: 06/18/21 0106     WBC 8 36 Thousand/uL      RBC 4 07 Million/uL      Hemoglobin 11 6 g/dL      Hematocrit 36 1 % MCV 89 fL      MCH 28 5 pg      MCHC 32 1 g/dL      RDW 14 9 %      MPV 9 0 fL      Platelets 543 Thousands/uL      nRBC 0 /100 WBCs      Neutrophils Relative 83 %      Immat GRANS % 1 %      Lymphocytes Relative 8 %      Monocytes Relative 7 %      Eosinophils Relative 0 %      Basophils Relative 1 %      Neutrophils Absolute 7 03 Thousands/µL      Immature Grans Absolute 0 04 Thousand/uL      Lymphocytes Absolute 0 70 Thousands/µL      Monocytes Absolute 0 54 Thousand/µL      Eosinophils Absolute 0 01 Thousand/µL      Basophils Absolute 0 04 Thousands/µL     Blood culture #2 [[id number]] Collected: 06/18/21 0057    Lab Status: In process Specimen: Blood from Arm, Right Updated: 06/18/21 0102    Blood culture #1 [[id number]] Collected: 06/18/21 0057    Lab Status: In process Specimen: Blood from Arm, Left Updated: 06/18/21 0102                 XR abdomen obstruction series   ED Interpretation by Mac Morgan MD (06/18 4813)   No acute disease; increased stool; NSBGP; no obstruction or free air read by me  CT head without contrast   ED Interpretation by Mac Morgan MD (06/18 3355)   FINDINGS:     PARENCHYMA:  There is an approximately 6 x 9 x 7 mm hyperdensity along the right hand aspect of the posterior falx (series 2 image 28, coronal series 400 image 74)  A small calcification is present along the falx in this area  This may represent a   small meningioma  Short-term follow-up is recommended in order to ensure stability  There is no evidence of significant associated edema within the adjacent brain  There is no midline shift  No CT signs of acute infarction  There is central atrophy  Moderate microangiopathic changes are present      VENTRICLES AND EXTRA-AXIAL SPACES:  Ventricles and extra-axial CSF spaces are prominent commensurate with the degree of volume loss    No hydrocephalus        VISUALIZED ORBITS AND PARANASAL SINUSES:  Unremarkable      CALVARIUM AND EXTRACRANIAL SOFT TISSUES:  Normal      IMPRESSION:     There is an approximately 6 x 9 x 7 mm hyperdensity along the right hand aspect of the posterior falx, as described above  Please see d   iscussion  This may represent a small meningioma  Short-term follow-up is recommended in order to ensure stability      Atrophy  Moderate microangiopathic change      This examination demonstrates findings for which imaging follow-up is recommended and was logged as such in [address] [place] [address]            I personally discussed this study with [address] [place] [address] on 6/18/2021 at 3:45 AM                           Workstation performed: [id number]         Final Result by Nico Reeves MD (06/18 0353)      There is an approximately 6 x 9 x 7 mm hyperdensity along the right hand aspect of the posterior falx, as described above  Please see discussion  This may represent a small meningioma  Short-term follow-up is recommended in order to ensure stability  Atrophy  Moderate microangiopathic change  This examination demonstrates findings for which imaging follow-up is recommended and was logged as such in [address] [place] [address]               I personally discussed this study with [address] [place] [address] on 6/18/2021 at 3:45 AM                            Workstation performed: [id number]                    Procedures  ECG 12 Lead Documentation Only    Date/Time: 6/18/2021 1:21 AM  Performed by: Carlo Hughes MD  Authorized by: Carlo Hughes MD     Indications / Diagnosis:  AMS, weakness  ECG reviewed by me, the ED Provider: yes    Patient location:  ED  Quality:     Tracing quality:  Limited by artifact  Rate:     ECG rate:  87    ECG rate assessment: normal    Rhythm:     Rhythm: sinus rhythm    Ectopy:     Ectopy: none    QRS:     QRS axis:  Normal    QRS intervals:  Normal  Conduction:     Conduction: normal    ST segments:     ST segments:  Normal  T waves:     T waves: normal    Q waves:     Q waves:  V2             ED Course  ED Course as of Jun 18 0358 Fri Jun 18, 2021   0358 IV rocephin ordered  Urine Microscopic(!)             HEART Risk Score      Most Recent Value   Heart Score Risk Calculator   History  0 Filed at: 06/18/2021 0128   ECG  0 Filed at: 06/18/2021 0128   Age  2 Filed at: 06/18/2021 0128   Risk Factors  2 Filed at: 06/18/2021 0128   Troponin  0 Filed at: 06/18/2021 0128   HEART Score  4 Filed at: 06/18/2021 0128                      SBIRT 22yo+      Most Recent Value   SBIRT (23 yo +)   In order to provide better care to our patients, we are screening all of our patients for alcohol and drug use  Would it be okay to ask you these screening questions? No Filed at: 06/18/2021 0138   Initial Alcohol Screen: US AUDIT-C    1  How often do you have a drink containing alcohol?  0 Filed at: 06/18/2021 0138   2  How many drinks containing alcohol do you have on a typical day you are drinking? 0 Filed at: 06/18/2021 0138   3a  Male UNDER 65: How often do you have five or more drinks on one occasion? 0 Filed at: 06/18/2021 0138   3b  FEMALE Any Age, or MALE 65+: How often do you have 4 or more drinks on one occassion? 0 Filed at: 06/18/2021 0138   Audit-C Score  0 Filed at: 06/18/2021 0240   MIKE: How many times in the past year have you    Used an illegal drug or used a prescription medication for non-medical reasons? Never Filed at: 06/18/2021 0138                    MDM  Number of Diagnoses or Management Options  History of recurrent UTIs  Diagnosis management comments: DDx including but not limited to: metabolic abnormality, intracranial etiology, cardiac etiology, infectious etiology including UTI, thyroid disease, hyperammonemia, delirium, dementia, overmedication, constipation; doubt acute surgical intraabdominal process          Amount and/or Complexity of Data Reviewed  Clinical lab tests: ordered and reviewed  Tests in the radiology section of CPT®: ordered and reviewed  Decide to obtain previous medical records or to obtain history from someone other than the patient: yes  Obtain history from someone other than the patient: yes  Discuss the patient with other providers: yes  Independent visualization of images, tracings, or specimens: yes        Disposition  Final diagnoses:   History of recurrent UTIs   AMS (altered mental status)   Dementia ([facility] )   Ambulatory dysfunction   Raised TSH level   UTI (urinary tract infection)     Time reflects when diagnosis was documented in both MDM as applicable and the Disposition within this note     Time User Action Codes Description Comment    6/18/2021 12:05 AM Arch Pardo Add [Z87 440] History of recurrent UTIs     6/18/2021  1:49 AM Arch Pardo Add [R41 82] AMS (altered mental status)     6/18/2021  1:49 AM Arch Pardo Add [F03 90] Dementia ([facility] )     6/18/2021  1:49 AM Arch Pardo Add [R26 2] Ambulatory dysfunction     6/18/2021  1:49 AM Arch Pardo Modify [Z87 440] History of recurrent UTIs     6/18/2021  1:49 AM Arch Pardo Modify [R41 82] AMS (altered mental status)     6/18/2021  1:49 AM Arch Pardo Add [R79 89] Raised TSH level     6/18/2021  2:20 AM Arch Pardo Add [N39 0] UTI (urinary tract infection)       ED Disposition     ED Disposition Condition Date/Time Comment    Admit Stable Fri Jun 18, 2021  3:58 AM Case was discussed with FEDERICO Hudson  and the patient's admission status was agreed to be Admission Status: inpatient status to the service of Dr Sugar Fatima   Follow-up Information    None         Patient's Medications   Discharge Prescriptions    No medications on file     No discharge procedures on file      PDMP Review       Value Time User    PDMP Reviewed  Yes 6/17/2021 11:57 PM David Desai MD          ED Provider  Electronically Signed by           David Desai MD  06/18/21 9838

## 2021-06-18 NOTE — PLAN OF CARE
Problem: PHYSICAL THERAPY ADULT  Goal: Performs mobility at highest level of function for planned discharge setting  See evaluation for individualized goals  Description: Treatment/Interventions: Functional transfer training, LE strengthening/ROM, Therapeutic exercise, Endurance training, Cognitive reorientation, Patient/family training, Equipment eval/education, Bed mobility          See flowsheet documentation for full assessment, interventions and recommendations  6/18/2021 1534 by Devang Bocanegra PT  Note: Prognosis: Fair  Problem List: Decreased strength, Decreased endurance, Impaired balance, Decreased cognition, Impaired judgement, Decreased safety awareness, Pain  Assessment: Kierra Mendez is a 77 y o  Female who [facility] from home w/ c/o FTT and diagnosis of ambulatory dysfunction  Orders for PT eval and treat received, w/ activity orders of up w/ A and fall precautions  Pt presents w/ comorbidities of Alzheimer's Dementia, HTN, glaucoma, CKD Stage 4, DMII and personal factors including: advanced age, mobilizing w/ assistive device, stair(s) to enter home, decreased cognition, positive fall history, anxiety, depression and inability to perform IADLs  At baseline, pt mobilizes independently w/ RW, and reports at least 1 falls in the last 6 months  Upon evaluation, pt presents w/ the following deficits: weakness, impaired balance, decreased endurance, pain limiting functional mobility and decreased cognition  Pt requires max A x 1 for bed mobility, max A x 2 for transfers  Pt's clinical presentation is unstable/unpredictable due to need for assist w/ all phases of mobility when usually mobilizing independently, need for input for task focus and mobility technique, recent drastic decline in mobility compared to baseline, recent history of falls and decreased cognition impacting overall mobility status   Pt is at an increased risk of falls due to physical, safety awareness, and cognitive deficits  Given the above findings, discharge recommendation is for Post-acute inpatient rehabilitation  During this admission, pt would benefit from skilled acute inpatient PT in order to address the abovementioned deficits to maximize function and mobility before DC from acute care  PT Discharge Recommendation: Post acute rehabilitation services          See flowsheet documentation for full assessment

## 2021-06-18 NOTE — ASSESSMENT & PLAN NOTE
Lab Results   Component Value Date    HGBA1C 5 7 (H) 06/10/2021     · Well controlled  · Hold Tradjenta start sliding scale insulin  · Accu-Cheks q i d

## 2021-06-18 NOTE — ASSESSMENT & PLAN NOTE
Lab Results   Component Value Date    EGFR 32 06/18/2021    EGFR 34 06/10/2021    EGFR 28 08/11/2020    CREATININE 1 75 (H) 06/18/2021    CREATININE 1 67 (H) 06/10/2021    CREATININE 1 97 (H) 08/11/2020     · Renal function appears to be at baseline    · Continue to trend metabolic panels daily

## 2021-06-19 LAB
BACTERIA UR CULT: NORMAL
GLUCOSE SERPL-MCNC: 139 MG/DL (ref 65–140)
GLUCOSE SERPL-MCNC: 156 MG/DL (ref 65–140)
GLUCOSE SERPL-MCNC: 177 MG/DL (ref 65–140)
GLUCOSE SERPL-MCNC: 179 MG/DL (ref 65–140)

## 2021-06-19 PROCEDURE — 99232 SBSQ HOSP IP/OBS MODERATE 35: CPT | Performed by: INTERNAL MEDICINE

## 2021-06-19 PROCEDURE — 82948 REAGENT STRIP/BLOOD GLUCOSE: CPT

## 2021-06-19 PROCEDURE — 97167 OT EVAL HIGH COMPLEX 60 MIN: CPT

## 2021-06-19 RX ADMIN — FERROUS SULFATE TAB 325 MG (65 MG ELEMENTAL FE) 325 MG: 325 (65 FE) TAB at 08:56

## 2021-06-19 RX ADMIN — RISPERIDONE 0.25 MG: 0.25 TABLET ORAL at 08:55

## 2021-06-19 RX ADMIN — HEPARIN SODIUM 5000 UNITS: 5000 INJECTION INTRAVENOUS; SUBCUTANEOUS at 05:31

## 2021-06-19 RX ADMIN — DOCUSATE SODIUM AND SENNOSIDES 1 TABLET: 8.6; 5 TABLET, FILM COATED ORAL at 21:59

## 2021-06-19 RX ADMIN — LOSARTAN POTASSIUM 25 MG: 25 TABLET, FILM COATED ORAL at 08:55

## 2021-06-19 RX ADMIN — FUROSEMIDE 20 MG: 40 TABLET ORAL at 08:55

## 2021-06-19 RX ADMIN — BUPROPION HYDROCHLORIDE 300 MG: 150 TABLET, EXTENDED RELEASE ORAL at 08:54

## 2021-06-19 RX ADMIN — HEPARIN SODIUM 5000 UNITS: 5000 INJECTION INTRAVENOUS; SUBCUTANEOUS at 21:59

## 2021-06-19 RX ADMIN — HYDRALAZINE HYDROCHLORIDE 25 MG: 25 TABLET, FILM COATED ORAL at 21:59

## 2021-06-19 RX ADMIN — DONEPEZIL HYDROCHLORIDE 10 MG: 5 TABLET, FILM COATED ORAL at 21:59

## 2021-06-19 RX ADMIN — HEPARIN SODIUM 5000 UNITS: 5000 INJECTION INTRAVENOUS; SUBCUTANEOUS at 13:26

## 2021-06-19 RX ADMIN — ASPIRIN 81 MG: 81 TABLET, COATED ORAL at 08:55

## 2021-06-19 RX ADMIN — SODIUM CHLORIDE 125 ML/HR: 0.9 INJECTION, SOLUTION INTRAVENOUS at 09:02

## 2021-06-19 RX ADMIN — MECLIZINE HYDROCHLORIDE 25 MG: 25 TABLET ORAL at 21:59

## 2021-06-19 RX ADMIN — AMLODIPINE BESYLATE 10 MG: 10 TABLET ORAL at 08:54

## 2021-06-19 RX ADMIN — HYDRALAZINE HYDROCHLORIDE 25 MG: 25 TABLET, FILM COATED ORAL at 08:54

## 2021-06-19 RX ADMIN — RISPERIDONE 0.25 MG: 0.25 TABLET ORAL at 17:18

## 2021-06-19 RX ADMIN — ATORVASTATIN CALCIUM 40 MG: 40 TABLET, FILM COATED ORAL at 08:55

## 2021-06-19 RX ADMIN — MECLIZINE HYDROCHLORIDE 25 MG: 25 TABLET ORAL at 08:55

## 2021-06-19 RX ADMIN — PANTOPRAZOLE SODIUM 40 MG: 40 TABLET, DELAYED RELEASE ORAL at 05:31

## 2021-06-19 RX ADMIN — CITALOPRAM HYDROBROMIDE 40 MG: 20 TABLET ORAL at 08:55

## 2021-06-19 RX ADMIN — TRAZODONE HYDROCHLORIDE 50 MG: 50 TABLET ORAL at 21:59

## 2021-06-19 RX ADMIN — HYDRALAZINE HYDROCHLORIDE 25 MG: 25 TABLET, FILM COATED ORAL at 17:18

## 2021-06-19 RX ADMIN — INSULIN LISPRO 1 UNITS: 100 INJECTION, SOLUTION INTRAVENOUS; SUBCUTANEOUS at 17:19

## 2021-06-19 RX ADMIN — MEMANTINE 10 MG: 10 TABLET ORAL at 17:18

## 2021-06-19 RX ADMIN — DOCUSATE SODIUM 100 MG: 100 CAPSULE, LIQUID FILLED ORAL at 08:54

## 2021-06-19 RX ADMIN — MEMANTINE 10 MG: 10 TABLET ORAL at 08:54

## 2021-06-19 RX ADMIN — FUROSEMIDE 20 MG: 40 TABLET ORAL at 17:18

## 2021-06-19 NOTE — ASSESSMENT & PLAN NOTE
· Presents with worsening weakness with assisted fall earlier tonight  Denies head strike or LOC  Per ED notes, daughter is looking to have patient placed in an assisted living facility  · Consult PT/OT   · CT head with meningioma w/o other abnormalities   · Lab work appears unremarkable   · Was being treated for UTI in the outpatient setting which she finished keflex  · Urine with 4-10 WBCs with moderate bacteria  Also noted moderate epithelial cells  · Will hold on antibiotics for now  · Consult case management for help with placement  · Discussed with CM today - no facility found as of yet

## 2021-06-19 NOTE — ASSESSMENT & PLAN NOTE
Lab Results   Component Value Date    EGFR 34 06/18/2021    EGFR 32 06/18/2021    EGFR 34 06/10/2021    CREATININE 1 69 (H) 06/18/2021    CREATININE 1 75 (H) 06/18/2021    CREATININE 1 67 (H) 06/10/2021     · Renal function appears to be at baseline    · Continue to trend metabolic panels daily

## 2021-06-19 NOTE — CASE MANAGEMENT
MACARIO called [facility], and requested the status on bed availability for the Pt's d/c this weekend to their [facility] facility, per the request of her daughter Tere Perea to check anf follow up with MACARIO SORTO will continue to follow

## 2021-06-19 NOTE — CASE MANAGEMENT
MACARIO was in contact with Pt's daughter Keven Goff [phone] to discuss the currently accepting SNF  Giovanna reported she will review with her siblings and follow up with MACARIO regarding the facility of their choice  MACARIO will continue to follow

## 2021-06-19 NOTE — PLAN OF CARE
Problem: Potential for Falls  Goal: Patient will remain free of falls  Description: INTERVENTIONS:  - Educate patient/family on patient safety including physical limitations  - Instruct patient to call for assistance with activity   - Consult OT/PT to assist with strengthening/mobility   - Keep Call bell within reach  - Keep bed low and locked with side rails adjusted as appropriate  - Keep care items and personal belongings within reach  - Initiate and maintain comfort rounds  - Make Fall Risk Sign visible to staff  - Offer Toileting every 2 Hours, in advance of need  - Initiate/Maintain bed alarm  - Obtain necessary fall risk management equipment:   - Apply yellow socks and bracelet for high fall risk patients  - Consider moving patient to room near nurses station  Outcome: Progressing     Problem: MOBILITY - ADULT  Goal: Maintain or return to baseline ADL function  Description: INTERVENTIONS:  -  Assess patient's ability to carry out ADLs; assess patient's baseline for ADL function and identify physical deficits which impact ability to perform ADLs (bathing, care of mouth/teeth, toileting, grooming, dressing, etc )  - Assess/evaluate cause of self-care deficits   - Assess range of motion  - Assess patient's mobility; develop plan if impaired  - Assess patient's need for assistive devices and provide as appropriate  - Encourage maximum independence but intervene and supervise when necessary  - Involve family in performance of ADLs  - Assess for home care needs following discharge   - Consider OT consult to assist with ADL evaluation and planning for discharge  - Provide patient education as appropriate  Outcome: Progressing  Goal: Maintains/Returns to pre admission functional level  Description: INTERVENTIONS:  - Perform BMAT or MOVE assessment daily    - Set and communicate daily mobility goal to care team and patient/family/caregiver     - Collaborate with rehabilitation services on mobility goals if consulted  - Perform Range of Motion 4 times a day  - Reposition patient every 2 hours    - Dangle patient 4 times a day  - Out of bed to chair 3 times a day   - Out of bed for meals 3 times a day  - Out of bed for toileting  - Record patient progress and toleration of activity level   Outcome: Progressing     Problem: Prexisting or High Potential for Compromised Skin Integrity  Goal: Skin integrity is maintained or improved  Description: INTERVENTIONS:  - Identify patients at risk for skin breakdown  - Assess and monitor skin integrity  - Assess and monitor nutrition and hydration status  - Monitor labs   - Assess for incontinence   - Turn and reposition patient  - Assist with mobility/ambulation  - Relieve pressure over bony prominences  - Avoid friction and shearing  - Provide appropriate hygiene as needed including keeping skin clean and dry  - Evaluate need for skin moisturizer/barrier cream  - Collaborate with interdisciplinary team   - Patient/family teaching  - Consider wound care consult   Outcome: Progressing

## 2021-06-19 NOTE — OCCUPATIONAL THERAPY NOTE
Occupational Therapy Evaluation     Patient Name: Flori Avery  Today's Date: 6/19/2021  Problem List  Principal Problem:    Ambulatory dysfunction  Active Problems:    Late onset Alzheimer's disease without behavioral disturbance ([facility] )    Type 2 diabetes mellitus with stage 4 chronic kidney disease and hypertension ([facility])    CKD (chronic kidney disease) stage 4, GFR 15-29 ml/min ([facility])    Essential hypertension    Past Medical History  Past Medical History:   Diagnosis Date    Anxiety     Cholesterol depletion     Confusion     Dementia ([facility] )     Depression     Diabetes ([facility] )     Early onset Alzheimer's dementia ([facility] )     Glaucoma     Hypertension     Hypertension     Memory loss      Past Surgical History  Past Surgical History:   Procedure Laterality Date    CYSTOSCOPY  06/10/2021    HYSTERECTOMY               06/19/21 0942   OT Last Visit   OT Visit Date 06/19/21   Note Type   Note type Evaluation   Restrictions/Precautions   Weight Bearing Precautions Per Order No   Other Precautions Chair Alarm; Bed Alarm;Cognitive; Fall Risk   Pain Assessment   Pain Assessment Tool FLACC   Pain Rating: FLACC (Rest) - Face 0   Pain Rating: FLACC (Rest) - Legs 0   Pain Rating: FLACC (Rest) - Activity 0   Pain Rating: FLACC (Rest) - Cry 0   Pain Rating: FLACC (Rest) - Consolability 0   Score: FLACC (Rest) 0   Pain Rating: FLACC (Activity) - Face 0   Pain Rating: FLACC (Activity) - Legs 0   Pain Rating: FLACC (Activity) - Activity 0   Pain Rating: FLACC (Activity) - Cry 0   Pain Rating: FLACC (Activity) - Consolability 0   Score: FLACC (Activity) 0   Home Living   Type of Home House  (3STE)   Home Layout One level   Additional Comments Pt poor historian due to cognition  Per CM note patient resides with daughter in 1 level ranch   She was independnet with functional mobility with walker untill 5-6 days prior to admission and then has needed mod/max assist with ambulation   She has had decline since last year with performing ADL from requiring Min A to needing Max a to perform  Pt will not eat unless daughter assists  Daughter performs IADL  and med managment  Prior Function   Level of [place] Needs assistance with IADLs; Needs assistance with ADLs and functional mobility   Lives With Daughter   Receives Help From Family   ADL Assistance Needs assistance   IADLs Needs assistance   ADL   Eating Assistance 4  Minimal Assistance   Eating Deficit Increased time to complete;Verbal cueing   Grooming Assistance 3  Moderate Assistance   Grooming Deficit Increased time to complete;Verbal cueing   UB Bathing Assistance 3  Moderate Assistance   UB Bathing Deficit Right arm;Left arm; Increased time to complete;Verbal cueing   LB Bathing Assistance Unable to assess   UB Dressing Assistance 4  Minimal [address]  Maximal [address]  Unable to assess   Bed Mobility   Supine to Sit 2  Maximal assistance   Additional items HOB elevated; Increased time required;Verbal cues   Sit to Supine 2  Maximal assistance   Additional items Increased time required;Verbal cues   Transfers   Sit to Stand 2  Maximal assistance   Additional items Assist x 1; Increased time required;Verbal cues   Stand to Sit 2  Maximal assistance   Additional items Assist x 1; Increased time required;Verbal cues   Additional Comments Patient required verbal cues and max encouragement to perform task   patient required verbal cues for hand placement unable to follow 1 -step direction   Functional Mobility   Additional Comments Unable to assess today pt has limited standing tolerance   Balance   Static Sitting Fair -   Dynamic Sitting Fair -   Static Standing Poor -   Dynamic Standing Poor -   Activity Tolerance   Activity Tolerance Patient limited by fatigue   Nurse Made Aware ZOË cantor   RUSTANISLAW Assessment   RUE Assessment WFL   RUE Strength   RUE Overall Strength   (3/5)   LUE Assessment   LUE Assessment WFL   LUE Strength   LUE Overall Strength   (3/5)   Cognition   Arousal/Participation Lethargic   Orientation Level Oriented to person  (Able to state )   Following Commands Follows one step commands inconsistently   Comments Pt ID by wristband name and    Assessment   Limitation Decreased ADL status; Decreased UE strength;Decreased Safe judgement during ADL;Decreased cognition;Decreased endurance;Decreased self-care trans;Decreased high-level ADLs   Prognosis Fair   Assessment Patient evaluated by Occupational Therapy  Patient admitted with Ambulatory dysfunction  Patient had a assisted fall  The patients occupational profile, medical and therapy history includes a expanded review of medical and/or therapy records and additional review of physical, cognitive, or psychosocial history related to current functional performance  Comorbidities affecting functional mobility and ADLS include: anxiety, dementia, diabetes and hypertension  Prior to admission, patient was requiring assist for functional mobility with RW, requiring assist for ADLS, requiring assist for IADLS and living with daughet in a 1 level home with 3 steps to enter  Patient performed   grooming and UB bathing mod A, UB dressing Min A, LB dressing Max A, bed mobility Max A and transfer Max A  The evaluation identifies the following performance deficits: weakness, impaired balance, decreased endurance, decreased coordination, increased fall risk, new onset of impairment of functional mobility, decreased ADLS, decreased IADLS, decreased activity tolerance, decreased safety awareness and impaired judgement, that result in activity limitations and/or participation restrictions  This evaluation requires clinical decision making of high complexity, because the patient presents with comorbidites that affect occupational performance and required significant modification of tasks or assistance with consideration of multiple treatment options    The Barthel Index was used as a functional outcome tool presenting with a score of 10 The patient's raw score on the AM-PAC Daily Activity inpatient short form is 13, standardized score is 32 03, less than 39 4  Patients at this level are likely to benefit from DC to post-acute rehabilitation services  Please refer to the recommendation of the Occupational Therapist for safe DC planning  Patient will benefit from skilled Occupational Therapy services to address above deficits and facilitate a safe return to prior level of function  Goals   LTG Time Frame   (8-14)   Long Term Goal #1 Patient will increase standing tolerance to 5 minutes during ADL task to decrease assistance level and decrease fall risk; Patient will increase bed mobility to min assist in preparation for ADLS and transfers; Patient will increase functional mobility to and from bathroom with rolling walker with min assist to increase performance with ADLS and to use a toilet; Patient will tolerate 10 minutes of UE ROM/strengthening to increase general activity tolerance and performance in ADLS/IADLS; Patient will improve functional activity tolerance to 10 minutes of sustained functional tasks to increase participation in basic self-care and decrease assistance level;  Patient will increase dynamic standing balance to fair to improve postural stability and decrease fall risk during standing ADLS and transfers  Functional Transfer Goals   Pt Will Transfer To Bedside Commode With min assist   Pt Will Transfer To Toilet With min assist   Pt Will Transfer To Shower With min assist   ADL Goals   Pt Will Perform Eating With stand by assist   Pt Will Perform Grooming With stand by assist   Pt Will Perform Bathing With mod assist   Pt Will Perform UE Dressing With stand by assist   Pt Will Perform LE Dressing With min assist   Pt Will Perform Toileting With min assist   Plan   Treatment Interventions ADL retraining;Functional transfer training;UE strengthening/ROM; Cognitive reorientation; Neuromuscular reeducation;Continued evaluation; Activityengagement; Energy conservation   Goal Expiration Date 07/03/21   OT Frequency 3-5x/wk   Recommendation   OT Discharge Recommendation Post acute rehabilitation services   AM-PAC Daily Activity Inpatient   Lower Body Dressing 1   Bathing 1   Toileting 2   Upper Body Dressing 3   Grooming 3   Eating 3   Daily Activity Raw Score 13   Daily Activity Standardized Score (Calc for Raw Score >=11) 32 03   AM-PAC Applied Cognition Inpatient   Following a Speech/Presentation 2   Understanding Ordinary Conversation 2   Taking Medications 1   Remembering Where Things Are Placed or Put Away 1   Remembering List of 4-5 Errands 1   Taking Care of Complicated Tasks 1   Applied Cognition Raw Score 8   Applied Cognition Standardized Score 19 32   Barthel Index   Feeding 5   Bathing 0   Grooming Score 0   Dressing Score 5   Bladder Score 0   Bowels Score 0   Toilet Use Score 0   Transfers (Bed/Chair) Score 0   Mobility (Level Surface) Score 0   Stairs Score 0   Barthel Index Score 10   Zuleyma Giron, OT

## 2021-06-19 NOTE — PROGRESS NOTES
[facility]  Progress Note - Nsehakatiana Gordillomiko 1945, 77 y o  female MRN: [id number]  Unit/Bed#: S -01 Encounter: [id number]  Primary Care Provider: Andrew Lobo MD   Date and time admitted to hospital: 6/17/2021 11:09 PM    Essential hypertension  Assessment & Plan  · Initially elevated now stable  · Continue home medications including Lasix 20 mg b i d , hydralazine 25 mg t i d , losartan 25 mg daily, and amlodipine 10 mg    CKD (chronic kidney disease) stage 4, GFR 15-29 ml/min [facility])  Assessment & Plan  Lab Results   Component Value Date    EGFR 34 06/18/2021    EGFR 32 06/18/2021    EGFR 34 06/10/2021    CREATININE 1 69 (H) 06/18/2021    CREATININE 1 75 (H) 06/18/2021    CREATININE 1 67 (H) 06/10/2021     · Renal function appears to be at baseline  · Continue to trend metabolic panels daily    Type 2 diabetes mellitus with stage 4 chronic kidney disease and hypertension [facility])  Assessment & Plan    Lab Results   Component Value Date    HGBA1C 5 7 (H) 06/10/2021     · Well controlled  · Hold Tradjenta start sliding scale insulin  · Accu-Cheks q i d  Late onset Alzheimer's disease without behavioral disturbance ([facility] )  Assessment & Plan  · Noted history   · Supportive care     * Ambulatory dysfunction  Assessment & Plan  · Presents with worsening weakness with assisted fall earlier tonight  Denies head strike or LOC  Per ED notes, daughter is looking to have patient placed in an assisted living facility  · Consult PT/OT   · CT head with meningioma w/o other abnormalities   · Lab work appears unremarkable   · Was being treated for UTI in the outpatient setting which she finished keflex  · Urine with 4-10 WBCs with moderate bacteria  Also noted moderate epithelial cells  · Will hold on antibiotics for now  · Consult case management for help with placement  · Discussed with CM today - no facility found as of yet             VTE Pharmacologic Prophylaxis:   Pharmacologic: Heparin  Mechanical VTE Prophylaxis in Place: Yes    Patient Centered Rounds: I have performed bedside rounds with nursing staff today  Discussions with Specialists or Other Care Team Provider: Discussed with CM  Education and Discussions with Family / Patient: Discussed with patient  Called daughter Wayne Matos  Poor connection  Time Spent for Care: 30 minutes  More than 50% of total time spent on counseling and coordination of care as described above  Current Length of Stay: 1 day(s)    Current Patient Status: Inpatient   Certification Statement: The patient will continue to require additional inpatient hospital stay due to need for placement  Discharge Plan: Not medically stable for DC  Code Status: Level 1 - Full Code      Subjective:   Patient seen and examined  Feeling "good"    Objective:     Vitals:   Temp (24hrs), Av 5 °F (36 4 °C), Min:97 4 °F (36 3 °C), Max:97 5 °F (36 4 °C)    Temp:  [97 4 °F (36 3 °C)-97 5 °F (36 4 °C)] 97 5 °F (36 4 °C)  HR:  [75-90] 90  Resp:  [14-20] 20  BP: (128-175)/(66-89) 160/78  SpO2:  [93 %-96 %] 96 %  Body mass index is 28 84 kg/m²  Input and Output Summary (last 24 hours): Intake/Output Summary (Last 24 hours) at 2021 1510  Last data filed at 2021 0902  Gross per 24 hour   Intake 1000 ml   Output 950 ml   Net 50 ml       Physical Exam:     Physical Exam  Constitutional:       General: She is not in acute distress  Appearance: She is not ill-appearing or toxic-appearing  HENT:      Head: Normocephalic  Nose: Nose normal       Mouth/Throat:      Mouth: Mucous membranes are moist    Eyes:      General: No scleral icterus  Right eye: No discharge  Left eye: No discharge  Pupils: Pupils are equal, round, and reactive to light  Cardiovascular:      Rate and Rhythm: Normal rate  Heart sounds: No murmur heard  No friction rub  No gallop      Pulmonary:      Effort: Pulmonary effort is normal  No respiratory distress  Breath sounds: No stridor  No wheezing, rhonchi or rales  Chest:      Chest wall: No tenderness  Abdominal:      General: Abdomen is flat  There is no distension  Palpations: There is no mass  Tenderness: There is no abdominal tenderness  There is no right CVA tenderness, left CVA tenderness, guarding or rebound  Hernia: No hernia is present  Musculoskeletal:         General: No swelling, tenderness, deformity or signs of injury  Cervical back: Normal range of motion  Right lower leg: No edema  Left lower leg: No edema  Skin:     Capillary Refill: Capillary refill takes less than 2 seconds  Coloration: Skin is pale  Skin is not jaundiced  Findings: No bruising, erythema, lesion or rash  Neurological:      General: No focal deficit present  Mental Status: She is alert  Cranial Nerves: No cranial nerve deficit  Sensory: No sensory deficit  Motor: No weakness        Coordination: Coordination normal       Gait: Gait normal       Deep Tendon Reflexes: Reflexes normal    Psychiatric:         Mood and Affect: Mood normal          Additional Data:     Labs:    Results from last 7 days   Lab Units 06/18/21  0618   WBC Thousand/uL 8 25   HEMOGLOBIN g/dL 11 2*   HEMATOCRIT % 35 5   PLATELETS Thousands/uL 285  285   NEUTROS PCT % 75   LYMPHS PCT % 15   MONOS PCT % 8   EOS PCT % 0     Results from last 7 days   Lab Units 06/18/21  0618 06/18/21  0057   SODIUM mmol/L 143 142   POTASSIUM mmol/L 3 9 4 4   CHLORIDE mmol/L 109* 107   CO2 mmol/L 22 23   BUN mg/dL 22 23   CREATININE mg/dL 1 69* 1 75*   ANION GAP mmol/L 12 12   CALCIUM mg/dL 10 2* 10 2*   ALBUMIN g/dL  --  3 5   TOTAL BILIRUBIN mg/dL  --  0 58   ALK PHOS U/L  --  116   ALT U/L  --  18   AST U/L  --  19   GLUCOSE RANDOM mg/dL 110 141*     Results from last 7 days   Lab Units 06/18/21  0057   INR  0 99     Results from last 7 days   Lab Units 06/19/21  1112 06/19/21  0815 06/18/21  2116 06/18/21  1649 06/18/21  1213 06/18/21  0729 06/18/21  0106   POC GLUCOSE mg/dl 179* 139 142* 141* 166* 114 140         Results from last 7 days   Lab Units 06/18/21  0057   LACTIC ACID mmol/L 0 9           * I Have Reviewed All Lab Data Listed Above  * Additional Pertinent Lab Tests Reviewed: All Labs Within Last 24 Hours Reviewed    Imaging:    Imaging Reports Reviewed Today Include: CxR -   "  Nonobstructive bowel gas pattern  "  Imaging Personally Reviewed by Myself Includes:  As above  Recent Cultures (last 7 days):     Results from last 7 days   Lab Units 06/18/21  0121 06/18/21  0057   BLOOD CULTURE   --  No Growth at 24 hrs  No Growth at 24 hrs     URINE CULTURE  No Growth <1000 cfu/mL  --        Last 24 Hours Medication List:   Current Facility-Administered Medications   Medication Dose Route Frequency Provider Last Rate    acetaminophen  650 mg Oral Q6H PRN Jag Figueredo PA-C      amLODIPine  10 mg Oral Daily Jag Figueredo PA-C      aspirin  81 mg Oral Daily Jag Figueredo PA-C      atorvastatin  40 mg Oral Daily Jag Figueredo PA-C      buPROPion  300 mg Oral Daily Jag Figueredo PA-C      citalopram  40 mg Oral Daily Jag Figueredo PA-C      docusate sodium  100 mg Oral Daily Jag Figueredo PA-C      donepezil  10 mg Oral HS Jag Figueredo PA-C      ferrous sulfate  325 mg Oral Daily With Breakfast Jag Figueredo PA-C      fluticasone  1 spray Nasal Daily Jag Figueredo PA-C      furosemide  20 mg Oral BID (diuretic) Jag Figueredo PA-C      heparin (porcine)  5,000 Units Subcutaneous [facility] Jag Fgiueredo PA-C      hydrALAZINE  25 mg Oral TID Orquidea Naqvi PA-C      insulin lispro  1-5 Units Subcutaneous TID AC Jag Figueredo PA-C      losartan  25 mg Oral Daily Jag Figueredo PA-C      meclizine  25 mg Oral BID Jag Figueredo PA-C      memantine  10 mg Oral BID Jag Figueredo PA-C      pantoprazole  40 mg Oral Early Morning Jag Figueredo DORINA      polyethylene glycol  17 g Oral Daily PRN Prabhakar Must, DORINA      risperiDONE  0 25 mg Oral BID Prabhakar MustDORINA      senna-docusate sodium  1 tablet Oral HS Jag Figueredo PA-C      sodium chloride  125 mL/hr Intravenous Continuous Elen Miller  mL/hr (06/19/21 0902)    traZODone  50 mg Oral HS Jag Figueredo PA-C          Today, Patient Was Seen By: Srinivasan Barahona MD    ** Please Note: Dictation voice to text software may have been used in the creation of this document   **

## 2021-06-19 NOTE — PLAN OF CARE
Problem: OCCUPATIONAL THERAPY ADULT  Goal: Performs self-care activities at highest level of function for planned discharge setting  See evaluation for individualized goals  Description: Treatment Interventions: ADL retraining, Functional transfer training, UE strengthening/ROM, Cognitive reorientation, Neuromuscular reeducation, Continued evaluation, Activityengagement, Energy conservation          See flowsheet documentation for full assessment, interventions and recommendations  Note: Limitation: Decreased ADL status, Decreased UE strength, Decreased Safe judgement during ADL, Decreased cognition, Decreased endurance, Decreased self-care trans, Decreased high-level ADLs  Prognosis: Fair  Assessment: Patient evaluated by Occupational Therapy  Patient admitted with Ambulatory dysfunction  Patient had a assisted fall  The patients occupational profile, medical and therapy history includes a expanded review of medical and/or therapy records and additional review of physical, cognitive, or psychosocial history related to current functional performance  Comorbidities affecting functional mobility and ADLS include: anxiety, dementia, diabetes and hypertension  Prior to admission, patient was requiring assist for functional mobility with RW, requiring assist for ADLS, requiring assist for IADLS and living with daughet in a 1 level home with 3 steps to enter  Patient performed   grooming and UB bathing mod A, UB dressing Min A, LB dressing Max A, bed mobility Max A and transfer Max A  The evaluation identifies the following performance deficits: weakness, impaired balance, decreased endurance, decreased coordination, increased fall risk, new onset of impairment of functional mobility, decreased ADLS, decreased IADLS, decreased activity tolerance, decreased safety awareness and impaired judgement, that result in activity limitations and/or participation restrictions   This evaluation requires clinical decision making of high complexity, because the patient presents with comorbidites that affect occupational performance and required significant modification of tasks or assistance with consideration of multiple treatment options  The Barthel Index was used as a functional outcome tool presenting with a score of 10 The patient's raw score on the -PAC Daily Activity inpatient short form is 13, standardized score is 32 03, less than 39 4  Patients at this level are likely to benefit from DC to post-acute rehabilitation services  Please refer to the recommendation of the Occupational Therapist for safe DC planning  Patient will benefit from skilled Occupational Therapy services to address above deficits and facilitate a safe return to prior level of function       OT Discharge Recommendation: Post acute rehabilitation services

## 2021-06-20 LAB
GLUCOSE SERPL-MCNC: 111 MG/DL (ref 65–140)
GLUCOSE SERPL-MCNC: 129 MG/DL (ref 65–140)
GLUCOSE SERPL-MCNC: 133 MG/DL (ref 65–140)
GLUCOSE SERPL-MCNC: 178 MG/DL (ref 65–140)

## 2021-06-20 PROCEDURE — 82948 REAGENT STRIP/BLOOD GLUCOSE: CPT

## 2021-06-20 PROCEDURE — 99232 SBSQ HOSP IP/OBS MODERATE 35: CPT | Performed by: INTERNAL MEDICINE

## 2021-06-20 RX ORDER — POLYETHYLENE GLYCOL 3350 17 G/17G
17 POWDER, FOR SOLUTION ORAL 2 TIMES DAILY
Status: DISCONTINUED | OUTPATIENT
Start: 2021-06-20 | End: 2021-06-21 | Stop reason: HOSPADM

## 2021-06-20 RX ADMIN — MEMANTINE 10 MG: 10 TABLET ORAL at 09:17

## 2021-06-20 RX ADMIN — ASPIRIN 81 MG: 81 TABLET, COATED ORAL at 09:17

## 2021-06-20 RX ADMIN — FUROSEMIDE 20 MG: 40 TABLET ORAL at 09:17

## 2021-06-20 RX ADMIN — AMLODIPINE BESYLATE 10 MG: 10 TABLET ORAL at 09:17

## 2021-06-20 RX ADMIN — FLUTICASONE PROPIONATE 1 SPRAY: 50 SPRAY, METERED NASAL at 09:18

## 2021-06-20 RX ADMIN — BUPROPION HYDROCHLORIDE 300 MG: 150 TABLET, EXTENDED RELEASE ORAL at 09:17

## 2021-06-20 RX ADMIN — HEPARIN SODIUM 5000 UNITS: 5000 INJECTION INTRAVENOUS; SUBCUTANEOUS at 05:39

## 2021-06-20 RX ADMIN — HYDRALAZINE HYDROCHLORIDE 25 MG: 25 TABLET, FILM COATED ORAL at 22:32

## 2021-06-20 RX ADMIN — POLYETHYLENE GLYCOL 3350 17 G: 17 POWDER, FOR SOLUTION ORAL at 11:57

## 2021-06-20 RX ADMIN — MEMANTINE 10 MG: 10 TABLET ORAL at 19:22

## 2021-06-20 RX ADMIN — FERROUS SULFATE TAB 325 MG (65 MG ELEMENTAL FE) 325 MG: 325 (65 FE) TAB at 09:17

## 2021-06-20 RX ADMIN — RISPERIDONE 0.25 MG: 0.25 TABLET ORAL at 19:20

## 2021-06-20 RX ADMIN — CITALOPRAM HYDROBROMIDE 40 MG: 20 TABLET ORAL at 09:17

## 2021-06-20 RX ADMIN — MECLIZINE HYDROCHLORIDE 25 MG: 25 TABLET ORAL at 22:28

## 2021-06-20 RX ADMIN — DONEPEZIL HYDROCHLORIDE 10 MG: 5 TABLET, FILM COATED ORAL at 19:20

## 2021-06-20 RX ADMIN — HEPARIN SODIUM 5000 UNITS: 5000 INJECTION INTRAVENOUS; SUBCUTANEOUS at 22:28

## 2021-06-20 RX ADMIN — PANTOPRAZOLE SODIUM 40 MG: 40 TABLET, DELAYED RELEASE ORAL at 05:39

## 2021-06-20 RX ADMIN — ATORVASTATIN CALCIUM 40 MG: 40 TABLET, FILM COATED ORAL at 09:17

## 2021-06-20 RX ADMIN — MECLIZINE HYDROCHLORIDE 25 MG: 25 TABLET ORAL at 09:17

## 2021-06-20 RX ADMIN — INSULIN LISPRO 1 UNITS: 100 INJECTION, SOLUTION INTRAVENOUS; SUBCUTANEOUS at 11:57

## 2021-06-20 RX ADMIN — RISPERIDONE 0.25 MG: 0.25 TABLET ORAL at 09:17

## 2021-06-20 RX ADMIN — FUROSEMIDE 20 MG: 40 TABLET ORAL at 19:20

## 2021-06-20 RX ADMIN — TRAZODONE HYDROCHLORIDE 50 MG: 50 TABLET ORAL at 22:28

## 2021-06-20 RX ADMIN — HYDRALAZINE HYDROCHLORIDE 25 MG: 25 TABLET, FILM COATED ORAL at 09:16

## 2021-06-20 RX ADMIN — LOSARTAN POTASSIUM 25 MG: 25 TABLET, FILM COATED ORAL at 09:17

## 2021-06-20 RX ADMIN — DOCUSATE SODIUM 100 MG: 100 CAPSULE, LIQUID FILLED ORAL at 09:17

## 2021-06-20 RX ADMIN — DOCUSATE SODIUM AND SENNOSIDES 1 TABLET: 8.6; 5 TABLET, FILM COATED ORAL at 22:28

## 2021-06-20 RX ADMIN — SODIUM CHLORIDE 125 ML/HR: 0.9 INJECTION, SOLUTION INTRAVENOUS at 04:31

## 2021-06-20 RX ADMIN — HEPARIN SODIUM 5000 UNITS: 5000 INJECTION INTRAVENOUS; SUBCUTANEOUS at 14:29

## 2021-06-20 NOTE — ASSESSMENT & PLAN NOTE
· Initially elevated now stable    · Continue home medications including Lasix 20 mg b i d , hydralazine 25 mg t i d , losartan 25 mg daily, and amlodipine 10 mg  · /71  ·

## 2021-06-20 NOTE — ASSESSMENT & PLAN NOTE
Lab Results   Component Value Date    EGFR 34 06/18/2021    EGFR 32 06/18/2021    EGFR 34 06/10/2021    CREATININE 1 69 (H) 06/18/2021    CREATININE 1 75 (H) 06/18/2021    CREATININE 1 67 (H) 06/10/2021     · Renal function appears to be at baseline    · Continue to trend metabolic panels daily  · No labs in the last 2 days so will check again tomorrow

## 2021-06-20 NOTE — CASE MANAGEMENT
CM informed by [facility] that patient is nearly stable for discharge; she just needs to have a BM first  CM contacted patient's daughter Misti Akins to review discharge options  She reports that the first choice is still [facility], in terms of location and ratings  MACARIO Mtz contacted central intake yesterday but did not get a confirmed response  MACARIO spoke with Dilmalinda Jordan in [address]; she confirms that because patient is fully vaccinated, they do have a bed to offer at [address]  MACARIO let Dilma Jordan  Know we'll plan to accept the bed for admission on 6/21/21  Patient does not need COVID test to admit and Dilma Jordan has vaccination information  CM let [facility]IM and patient's daughter Misti Akins know about the bed offer at [address]  Misti Akins is very happy to accept and will await call from [facility] tomorrow with transportation details

## 2021-06-20 NOTE — PROGRESS NOTES
[facility]  Progress Note - [facility] 1945, 77 y o  female MRN: [id number]  Unit/Bed#: S -01 Encounter: [id number]  Primary Care Provider: Kirk Osei MD   Date and time admitted to hospital: 6/17/2021 11:09 PM    Essential hypertension  Assessment & Plan  · Initially elevated now stable  · Continue home medications including Lasix 20 mg b i d , hydralazine 25 mg t i d , losartan 25 mg daily, and amlodipine 10 mg  · /71  ·     CKD (chronic kidney disease) stage 4, GFR 15-29 ml/min [facility])  Assessment & Plan  Lab Results   Component Value Date    EGFR 34 06/18/2021    EGFR 32 06/18/2021    EGFR 34 06/10/2021    CREATININE 1 69 (H) 06/18/2021    CREATININE 1 75 (H) 06/18/2021    CREATININE 1 67 (H) 06/10/2021     · Renal function appears to be at baseline  · Continue to trend metabolic panels daily  · No labs in the last 2 days so will check again tomorrow    Type 2 diabetes mellitus with stage 4 chronic kidney disease and hypertension [facility])  Assessment & Plan    Lab Results   Component Value Date    HGBA1C 5 7 (H) 06/10/2021     · Well controlled  · Hold Tradjenta start sliding scale insulin  · Accu-Cheks q i d  Late onset Alzheimer's disease without behavioral disturbance ([facility] [address] )  Assessment & Plan  · Noted history   · Supportive care     * Ambulatory dysfunction  Assessment & Plan  · Presents with worsening weakness with assisted fall earlier tonight  Denies head strike or LOC  Per ED notes, daughter is looking to have patient placed in an assisted living facility  · Consult PT/OT   · CT head with meningioma w/o other abnormalities   · Lab work appears unremarkable   · Was being treated for UTI in the outpatient setting which she finished keflex  · Urine with 4-10 WBCs with moderate bacteria  Also noted moderate epithelial cells    · Will hold on antibiotics for now  · Consult case management for help with placement  · Discussed with CM today - no facility found as of yet  VTE Pharmacologic Prophylaxis:   Pharmacologic: Heparin  Mechanical VTE Prophylaxis in Place: Yes    Patient Centered Rounds: I have performed bedside rounds with nursing staff today  Discussions with Specialists or Other Care Team Provider:  Discussed with nursing    Education and Discussions with Family / Patient:  Discussed with the patient she says she has not had a bowel movement in 5-6 days and nothing as charted will start aggressive bowel regimen    Time Spent for Care: 30 minutes  More than 50% of total time spent on counseling and coordination of care as described above  Current Length of Stay: 2 day(s)    Current Patient Status: Inpatient   Certification Statement: The patient will continue to require additional inpatient hospital stay due to Awaiting placement also needs to have bowel movement    Discharge Plan:  When Case Management ready    Code Status: Level 1 - Full Code      Subjective:   Patient seen and examined  No new complaints  Denies any abdominal pain  "Haven't gone to the bathroom and at least 5 days"  Objective:     Vitals:   Temp (24hrs), Av 6 °F (36 4 °C), Min:97 4 °F (36 3 °C), Max:97 8 °F (36 6 °C)    Temp:  [97 4 °F (36 3 °C)-97 8 °F (36 6 °C)] 97 8 °F (36 6 °C)  HR:  [77-85] 84  Resp:  [16-18] 16  BP: (126-145)/(63-71) 145/71  SpO2:  [95 %-98 %] 98 %  Body mass index is 28 79 kg/m²  Input and Output Summary (last 24 hours): Intake/Output Summary (Last 24 hours) at 2021 1109  Last data filed at 2021 0900  Gross per 24 hour   Intake 280 ml   Output 700 ml   Net -420 ml       Physical Exam:     Physical Exam  Constitutional:       General: She is not in acute distress  Appearance: She is not ill-appearing, toxic-appearing or diaphoretic  HENT:      Head: Normocephalic  Right Ear: There is no impacted cerumen  Left Ear: There is no impacted cerumen  Nose: Nose normal  No congestion or rhinorrhea  Mouth/Throat:      Mouth: Mucous membranes are moist       Pharynx: No oropharyngeal exudate or posterior oropharyngeal erythema  Eyes:      General: No scleral icterus  Right eye: No discharge  Left eye: No discharge  Pupils: Pupils are equal, round, and reactive to light  Cardiovascular:      Rate and Rhythm: Normal rate  Heart sounds: No murmur heard  No gallop  Abdominal:      General: There is no distension  Palpations: There is no mass  Tenderness: There is no abdominal tenderness  There is no right CVA tenderness, left CVA tenderness, guarding or rebound  Hernia: No hernia is present  Musculoskeletal:         General: No swelling, tenderness or deformity  Cervical back: Normal range of motion  Right lower leg: No edema  Skin:     Capillary Refill: Capillary refill takes less than 2 seconds  Coloration: Skin is pale  Skin is not jaundiced  Findings: No bruising, erythema, lesion or rash  Neurological:      General: No focal deficit present  Mental Status: She is alert  Cranial Nerves: No cranial nerve deficit  Sensory: No sensory deficit  Motor: No weakness        Coordination: Coordination normal       Gait: Gait normal    Psychiatric:         Mood and Affect: Mood normal            Additional Data:     Labs:    Results from last 7 days   Lab Units 06/18/21  0618   WBC Thousand/uL 8 25   HEMOGLOBIN g/dL 11 2*   HEMATOCRIT % 35 5   PLATELETS Thousands/uL 285  285   NEUTROS PCT % 75   LYMPHS PCT % 15   MONOS PCT % 8   EOS PCT % 0     Results from last 7 days   Lab Units 06/18/21  0618 06/18/21  0057   SODIUM mmol/L 143 142   POTASSIUM mmol/L 3 9 4 4   CHLORIDE mmol/L 109* 107   CO2 mmol/L 22 23   BUN mg/dL 22 23   CREATININE mg/dL 1 69* 1 75*   ANION GAP mmol/L 12 12   CALCIUM mg/dL 10 2* 10 2*   ALBUMIN g/dL  --  3 5   TOTAL BILIRUBIN mg/dL  --  0 58   ALK PHOS U/L  --  116   ALT U/L  --  18   AST U/L  --  19 GLUCOSE RANDOM mg/dL 110 141*     Results from last 7 days   Lab Units 06/18/21  0057   INR  0 99     Results from last 7 days   Lab Units 06/20/21  1059 06/20/21  0748 06/19/21  2053 06/19/21  1624 06/19/21  1112 06/19/21  0815 06/18/21  2116 06/18/21  1649 06/18/21  1213 06/18/21  0729 06/18/21  0106   POC GLUCOSE mg/dl 178* 111 177* 156* 179* 139 142* 141* 166* 114 140         Results from last 7 days   Lab Units 06/18/21  0057   LACTIC ACID mmol/L 0 9           * I Have Reviewed All Lab Data Listed Above  * Additional Pertinent Lab Tests Reviewed: [address] Admission Reviewed    Imaging:    Imaging Reports Reviewed Today Include:   None pertinent to this encounter  Imaging Personally Reviewed by Myself Includes:  As above    Recent Cultures (last 7 days):     Results from last 7 days   Lab Units 06/18/21  0121 06/18/21  0057   BLOOD CULTURE   --  No Growth at 48 hrs  No Growth at 48 hrs     URINE CULTURE  No Growth <1000 cfu/mL  --        Last 24 Hours Medication List:   Current Facility-Administered Medications   Medication Dose Route Frequency Provider Last Rate    acetaminophen  650 mg Oral Q6H PRN Jag Figueredo PA-C      amLODIPine  10 mg Oral Daily Jag Figueredo PA-C      aspirin  81 mg Oral Daily Jag Figueredo PA-C      atorvastatin  40 mg Oral Daily Jag Figueredo PA-C      buPROPion  300 mg Oral Daily Jag Figueredo PA-C      citalopram  40 mg Oral Daily Jag Figueredo PA-C      docusate sodium  100 mg Oral Daily Jag Figueredo PA-C      donepezil  10 mg Oral HS Jag Figueredo PA-C      ferrous sulfate  325 mg Oral Daily With Breakfast Jag Figueredo PA-C      fluticasone  1 spray Nasal Daily Jag Figueredo PA-C      furosemide  20 mg Oral BID (diuretic) Jag Figueredo PA-C      heparin (porcine)  5,000 Units Subcutaneous [facility] Jag Figueredo PA-C      hydrALAZINE  25 mg Oral TID Jag Figueredo PA-C      insulin lispro  1-5 Units Subcutaneous TID AC Jag Figueredo PA-C      losartan  25 mg Oral Daily Jag Figueredo PA-C      meclizine  25 mg Oral BID Jacklyn Martinez PA-C      memantine  10 mg Oral BID Jag Figueredo PA-C      pantoprazole  40 mg Oral Early Morning Jag Figueredo PA-C      polyethylene glycol  17 g Oral Daily PRN Jacklyn Martinez PA-C      polyethylene glycol  17 g Oral BID Conchis Paulino MD      risperiDONE  0 25 mg Oral BID Jacklyn Martinez PA-C      senna-docusate sodium  1 tablet Oral HS Jag Figueredo PA-C      sodium chloride  125 mL/hr Intravenous Continuous Freddy Bender  mL/hr (06/20/21 0431)    traZODone  50 mg Oral HS Jacklyn Martinez PA-C          Today, Patient Was Seen By: Conchis Paulino MD    ** Please Note: Dictation voice to text software may have been used in the creation of this document   **

## 2021-06-21 VITALS
DIASTOLIC BLOOD PRESSURE: 72 MMHG | RESPIRATION RATE: 18 BRPM | HEIGHT: 65 IN | WEIGHT: 173.72 LBS | SYSTOLIC BLOOD PRESSURE: 134 MMHG | TEMPERATURE: 98.1 F | OXYGEN SATURATION: 96 % | BODY MASS INDEX: 28.94 KG/M2 | HEART RATE: 98 BPM

## 2021-06-21 LAB
ANION GAP SERPL CALCULATED.3IONS-SCNC: 14 MMOL/L (ref 4–13)
BUN SERPL-MCNC: 24 MG/DL (ref 5–25)
CALCIUM SERPL-MCNC: 10.2 MG/DL (ref 8.3–10.1)
CHLORIDE SERPL-SCNC: 108 MMOL/L (ref 100–108)
CO2 SERPL-SCNC: 22 MMOL/L (ref 21–32)
CREAT SERPL-MCNC: 1.85 MG/DL (ref 0.6–1.3)
GFR SERPL CREATININE-BSD FRML MDRD: 30 ML/MIN/1.73SQ M
GLUCOSE SERPL-MCNC: 106 MG/DL (ref 65–140)
GLUCOSE SERPL-MCNC: 121 MG/DL (ref 65–140)
POTASSIUM SERPL-SCNC: 3.9 MMOL/L (ref 3.5–5.3)
SODIUM SERPL-SCNC: 144 MMOL/L (ref 136–145)

## 2021-06-21 PROCEDURE — 80048 BASIC METABOLIC PNL TOTAL CA: CPT | Performed by: INTERNAL MEDICINE

## 2021-06-21 PROCEDURE — 82948 REAGENT STRIP/BLOOD GLUCOSE: CPT

## 2021-06-21 PROCEDURE — 99239 HOSP IP/OBS DSCHRG MGMT >30: CPT | Performed by: INTERNAL MEDICINE

## 2021-06-21 RX ORDER — TRAZODONE HYDROCHLORIDE 50 MG/1
50 TABLET ORAL
Qty: 90 TABLET | Refills: 0
Start: 2021-06-21 | End: 2021-07-06 | Stop reason: SDUPTHER

## 2021-06-21 RX ORDER — AMOXICILLIN 250 MG
1 CAPSULE ORAL
Qty: 30 TABLET | Refills: 0
Start: 2021-06-21 | End: 2021-08-30 | Stop reason: SDUPTHER

## 2021-06-21 RX ORDER — POLYETHYLENE GLYCOL 3350 17 G/17G
17 POWDER, FOR SOLUTION ORAL DAILY PRN
Qty: 1 EACH | Refills: 0
Start: 2021-06-21 | End: 2021-08-30 | Stop reason: SDUPTHER

## 2021-06-21 RX ORDER — FUROSEMIDE 20 MG/1
20 TABLET ORAL 2 TIMES DAILY
Qty: 30 TABLET | Refills: 0
Start: 2021-06-23 | End: 2021-06-22 | Stop reason: ALTCHOICE

## 2021-06-21 RX ADMIN — HEPARIN SODIUM 5000 UNITS: 5000 INJECTION INTRAVENOUS; SUBCUTANEOUS at 05:11

## 2021-06-21 RX ADMIN — DOCUSATE SODIUM 100 MG: 100 CAPSULE, LIQUID FILLED ORAL at 09:58

## 2021-06-21 RX ADMIN — HYDRALAZINE HYDROCHLORIDE 25 MG: 25 TABLET, FILM COATED ORAL at 09:57

## 2021-06-21 RX ADMIN — LOSARTAN POTASSIUM 25 MG: 25 TABLET, FILM COATED ORAL at 09:58

## 2021-06-21 RX ADMIN — AMLODIPINE BESYLATE 10 MG: 10 TABLET ORAL at 09:57

## 2021-06-21 RX ADMIN — FERROUS SULFATE TAB 325 MG (65 MG ELEMENTAL FE) 325 MG: 325 (65 FE) TAB at 10:11

## 2021-06-21 RX ADMIN — ATORVASTATIN CALCIUM 40 MG: 40 TABLET, FILM COATED ORAL at 09:57

## 2021-06-21 RX ADMIN — RISPERIDONE 0.25 MG: 0.25 TABLET ORAL at 09:58

## 2021-06-21 RX ADMIN — FUROSEMIDE 20 MG: 40 TABLET ORAL at 10:11

## 2021-06-21 RX ADMIN — FLUTICASONE PROPIONATE 1 SPRAY: 50 SPRAY, METERED NASAL at 09:58

## 2021-06-21 RX ADMIN — CITALOPRAM HYDROBROMIDE 40 MG: 20 TABLET ORAL at 09:58

## 2021-06-21 RX ADMIN — MEMANTINE 10 MG: 10 TABLET ORAL at 09:57

## 2021-06-21 RX ADMIN — ASPIRIN 81 MG: 81 TABLET, COATED ORAL at 09:57

## 2021-06-21 RX ADMIN — MECLIZINE HYDROCHLORIDE 25 MG: 25 TABLET ORAL at 09:58

## 2021-06-21 RX ADMIN — BUPROPION HYDROCHLORIDE 300 MG: 150 TABLET, EXTENDED RELEASE ORAL at 09:58

## 2021-06-21 RX ADMIN — POLYETHYLENE GLYCOL 3350 17 G: 17 POWDER, FOR SOLUTION ORAL at 09:58

## 2021-06-21 NOTE — ASSESSMENT & PLAN NOTE
· Presents with worsening weakness with assisted fall earlier tonight  Denies head strike or LOC  Per ED notes, daughter is looking to have patient placed in an assisted living facility  · Consult PT/OT   · CT head with meningioma w/o other abnormalities   · Lab work appears unremarkable   · Was being treated for UTI in the outpatient setting which she finished keflex  · Urine with 4-10 WBCs with moderate bacteria  Also noted moderate epithelial cells    · Will hold on antibiotics for now  · Consult case management for help with placement  DC to [facility] today

## 2021-06-21 NOTE — DISCHARGE SUMMARY
[facility]  Discharge- Emilee Kessler 1945, 77 y o  female MRN: [id number]  Unit/Bed#: S -01 Encounter: [id number]  Primary Care Provider: Mackenzie Faulkner MD   Date and time admitted to hospital: 6/17/2021 11:09 PM    Essential hypertension  Assessment & Plan  · Initially elevated now stable  · Continue home medications including Lasix 20 mg b i d , hydralazine 25 mg t i d , losartan 25 mg daily, and amlodipine 10 mg  · /72  ·     CKD (chronic kidney disease) stage 4, GFR 15-29 ml/min [facility])  Assessment & Plan  Lab Results   Component Value Date    EGFR 30 06/21/2021    EGFR 34 06/18/2021    EGFR 32 06/18/2021    CREATININE 1 85 (H) 06/21/2021    CREATININE 1 69 (H) 06/18/2021    CREATININE 1 75 (H) 06/18/2021     · Renal function slightly above baseline   · Hold lasix indefinitely  · Daughter not sure that she is even taking it  · Encourage oral intake  · Signed out to Dr Galdino Apodaca and [facility]     Type 2 diabetes mellitus with stage 4 chronic kidney disease and hypertension [facility])  Assessment & Plan    Lab Results   Component Value Date    HGBA1C 5 7 (H) 06/10/2021     · Well controlled  · Hold Tradjenta start sliding scale insulin  · Accu-Cheks q i d  Late onset Alzheimer's disease without behavioral disturbance ([address] )  Assessment & Plan  · Noted history   · Supportive care   · Discussed at length with all 3 children including Sariah Hartman and Joel Olivarez - she is slightly more confused than what she would be at baseline  · Discussed at length CT scan negative, urinalysis negative  · Possibly secondary to her having a bowel movement today x3  · Possibly secondary to hospital environment  · Will discharge to [facility] but will have close sign-out with PCP and also [facility] staff    * Ambulatory dysfunction  Assessment & Plan  · Presents with worsening weakness with assisted fall earlier tonight  Denies head strike or LOC   Per ED notes, daughter is looking to have patient placed in an assisted living facility  · Consult PT/OT   · CT head with meningioma w/o other abnormalities   · Lab work appears unremarkable   · Was being treated for UTI in the outpatient setting which she finished keflex  · Urine with 4-10 WBCs with moderate bacteria  Also noted moderate epithelial cells  · Will hold on antibiotics for now  · Consult case management for help with placement  DC to [facility] today         Discharging Physician / Practitioner: Stella Charlton MD  PCP: Molly Pisano MD  Admission Date:   Admission Orders (From admission, onward)     Ordered        06/18/21 0356  Inpatient Admission  Once                   Discharge Date: 06/21/21    Medical Problems     Resolved Problems  Date Reviewed: 6/21/2021    None                Consultations During Hospital Stay:  · None  Procedures Performed:   · CT brain     Significant Findings / Test Results:   · Meningioma  · Needs follow up CT in 3 months time     Incidental Findings:   · As above  Test Results Pending at Discharge (will require follow up): · BMP to be checked tomorrow   Outpatient Tests Requested:  · As above  BMP   · Also need CT brain PCP to follow  Complications:    None  Reason for Admission: ambulatory dysfunction  Hospital Course:     Abhinav Mary is a 77 y o  female patient who originally presented to the hospital on 6/17/2021 due to ambulatory dysfunction which is gradually been getting worse since January and then over the last week or 2 has been getting acutely worse as per her children  The patient does have a background of dementia but has been appearing more confused to her children over the last couple of weeks as well      She has not had a bowel movement in 5-6 days and so she received aggressive bowel regimen after which she had 3-4 large bowel movements here in hospital   Following this she was a little bit more tired than usual and her creatinine was also bumped to 1 85 with a baseline of what appears to be 1 6-1 9  I did discuss with her family and after lengthy discussion was determined that she would be best served to be discharged old ordered to follow-up with her geriatric physician and [facility] physician, her BMP and creatinine will be checked tomorrow the day after  Her Lasix will be held indefinitely  Furthermore she should hydrate well and if there is any significant decline in her mental status or ambulatory dysfunction which could not be attributed to just worsening dementia and age then she should be readmitted to hospital for further workup      Please see above list of diagnoses and related plan for additional information  Condition at Discharge: stable     Discharge Day Visit / Exam:     Subjective:    Patient seen and examined  No new symptoms  Feeling "better"  After bowel move  Vitals: Blood Pressure: 134/72 (06/21/21 0700)  Pulse: 98 (06/21/21 0700)  Temperature: 98 1 °F (36 7 °C) (06/21/21 0700)  Temp Source: Oral (06/21/21 0700)  Respirations: 18 (06/21/21 0700)  Height: 5' 5" (165 1 cm) (06/18/21 0600)  Weight - Scale: 78 8 kg (173 lb 11 6 oz) (06/21/21 0537)  SpO2: 96 % (06/21/21 0700)  Exam:   Physical Exam  Constitutional:       Comments: Elderly, frail  HENT:      Head: Normocephalic  Nose: Nose normal  No congestion or rhinorrhea  Mouth/Throat:      Mouth: Mucous membranes are moist    Eyes:      General:         Right eye: No discharge  Left eye: No discharge  Pupils: Pupils are equal, round, and reactive to light  Cardiovascular:      Rate and Rhythm: Normal rate  Heart sounds: No murmur heard  No friction rub  No gallop  Abdominal:      General: Abdomen is flat  There is no distension  Palpations: There is no mass  Tenderness: There is no abdominal tenderness  There is no right CVA tenderness, left CVA tenderness or guarding     Musculoskeletal:         General: No swelling, tenderness, deformity or signs of injury  Right lower leg: No edema  Skin:     Capillary Refill: Capillary refill takes less than 2 seconds  Coloration: Skin is pale  Skin is not jaundiced  Findings: No bruising or lesion  Neurological:      General: No focal deficit present  Mental Status: She is alert  Cranial Nerves: No cranial nerve deficit  Sensory: No sensory deficit  Motor: No weakness  Gait: Gait normal       Deep Tendon Reflexes: Reflexes normal    Psychiatric:         Mood and Affect: Mood normal          Discussion with Family:  Discussed with Alfredo Key  Discharge instructions/Information to patient and family:   See after visit summary for information provided to patient and family  Provisions for Follow-Up Care:  See after visit summary for information related to follow-up care and any pertinent home health orders  Disposition:     Home    For Discharges to [facility] SNF:   · Not Applicable to this Patient - Not Applicable to this Patient    Planned Readmission: none  Discharge Statement:  I spent 45 minutes discharging the patient  This time was spent on the day of discharge  I had direct contact with the patient on the day of discharge  Greater than 50% of the total time was spent examining patient, answering all patient questions, arranging and discussing plan of care with patient as well as directly providing post-discharge instructions  Additional time then spent on discharge activities  Discharge Medications:  See after visit summary for reconciled discharge medications provided to patient and family        ** Please Note: This note has been constructed using a voice recognition system **

## 2021-06-21 NOTE — ASSESSMENT & PLAN NOTE
· Noted history   · Supportive care   · Discussed at length with all 3 children including Euna Duane and Joel merry Almaguer - she is slightly more confused than what she would be at baseline  · Discussed at length CT scan negative, urinalysis negative     · Possibly secondary to her having a bowel movement today x3  · Possibly secondary to hospital environment  · Will discharge to [address] but will have close sign-out with PCP and also [address] staff

## 2021-06-21 NOTE — ASSESSMENT & PLAN NOTE
· Initially elevated now stable    · Continue home medications including Lasix 20 mg b i d , hydralazine 25 mg t i d , losartan 25 mg daily, and amlodipine 10 mg  · /72  ·

## 2021-06-21 NOTE — ASSESSMENT & PLAN NOTE
Lab Results   Component Value Date    EGFR 30 06/21/2021    EGFR 34 06/18/2021    EGFR 32 06/18/2021    CREATININE 1 85 (H) 06/21/2021    CREATININE 1 69 (H) 06/18/2021    CREATININE 1 75 (H) 06/18/2021     · Renal function slightly above baseline   · Hold lasix indefinitely  · Daughter not sure that she is even taking it  · Encourage oral intake     · Signed out to Dr Belinda Henderson and RICHARDO

## 2021-06-21 NOTE — DISCHARGE INSTR - AVS FIRST PAGE
Dear Hilary Almendarez,     It was our pleasure to care for you here at [facility]  It is our hope that we were always able to exceed the expected standards for your care during your stay  You were hospitalized due to ambulatory dysfunciton  You were cared for on the 3rd  floor under the service of Albania Johnson MD with the [address] [place] [address] [facility] who covers for your primary care physician (PCP), Russell Oleary MD, while you were hospitalized  If you have any questions or concerns related to this hospitalization, you may contact us at [phone]  For follow up as well as medication refills, we recommend that you follow up with your primary care physician  A registered nurse will reach out to you by phone within a few days after your discharge to answer any additional questions that you may have after going home  However, at this time we provide for you here, the most important instructions / recommendations at discharge:     · Notable Medication Adjustments -   · We held your lasix for the next 2 days because your creatinine was trending up  · You can restart this in 2 days time, perhaps 20 mg once a day or every other day, but will defer this to your primary care physician or nursing home physician  · Testing Required after Discharge -   · You will need to follow up with a CT scan of the head to watch a meningioma which was picked up during your hospitalization   · CT head -   · "There is an approximately 6 x 9 x 7 mm hyperdensity along the right hand aspect of the posterior falx, as described above   Please see discussion   This may represent a small meningioma   Short-term follow-up is recommended in order to ensure stability  · 3-6 months follow up with CT scan - I discussed with neurosurgery   Atrophy   Moderate microangiopathic change       This examination demonstrates findings for which imaging follow-up is recommended and was logged as such in [facility]  "    · Important follow up information -   · Please check your BMP tomorrow   · Other Instructions -   · None   · Please review this entire after visit summary as additional general instructions including medication list, appointments, activity, diet, any pertinent wound care, and other additional recommendations from your care team that may be provided for you        Sincerely,     Conchis Paulino MD

## 2021-06-21 NOTE — CASE MANAGEMENT
Pt for d/c today to [facility], to be transported by [facility] at 12pm via bls, MACARIO completed facility transfer and cmn form  CM notified Pt's daughter Ayden Junior and facility [facility] of d/c arrangements  Pt's IMM reviewed with Giovanna

## 2021-06-22 ENCOUNTER — NURSING HOME VISIT (OUTPATIENT)
Dept: GERIATRICS | Facility: OTHER | Age: 76
End: 2021-06-22
Payer: MEDICARE

## 2021-06-22 DIAGNOSIS — G30.1 LATE ONSET ALZHEIMER'S DISEASE WITHOUT BEHAVIORAL DISTURBANCE (HCC): ICD-10-CM

## 2021-06-22 DIAGNOSIS — N18.4 CKD (CHRONIC KIDNEY DISEASE) STAGE 4, GFR 15-29 ML/MIN (HCC): Primary | ICD-10-CM

## 2021-06-22 DIAGNOSIS — F02.80 LATE ONSET ALZHEIMER'S DISEASE WITHOUT BEHAVIORAL DISTURBANCE (HCC): ICD-10-CM

## 2021-06-22 PROCEDURE — 99307 SBSQ NF CARE SF MDM 10: CPT | Performed by: NURSE PRACTITIONER

## 2021-06-22 NOTE — ASSESSMENT & PLAN NOTE
-Patient with a history of severe dementia   -Continues on Aricept and Namenda   -Per daughter patient has had progressive decline and has been dependent for most ADLs and all IADLs  - Provide frequent redirection, reorientation, distraction techniques  - continue fall precautions at facility  - continue to assist with ADLs at facility   - Avoid deliriogenic medications such as tramadol, benzodiazepines, anticholinergics,  Benadryl  - Encourage Hydration/ Nutrition  - Implement sleep hygiene, limit night time interuptions, group activities

## 2021-06-22 NOTE — ASSESSMENT & PLAN NOTE
Lab Results   Component Value Date    EGFR 30 06/21/2021    EGFR 34 06/18/2021    EGFR 32 06/18/2021    CREATININE 1 85 (H) 06/21/2021    CREATININE 1 69 (H) 06/18/2021    CREATININE 1 75 (H) 06/18/2021     Baseline 1 6-1 9  Most recent creatinine 1 97 with a GFR of 28  Per discharge summary patient should have Lasix held indefinitely  Discontinue Lasix  Ensure adequate nutrition/hydration  Avoid nephrotoxic agents  Renally dose medications    Repeat BMP 06/24/2021

## 2021-06-22 NOTE — PROGRESS NOTES
[facility]  [address]  [phone]  [place]  Code 31  Acute Visit        NAME: Jose Lunsford  AGE: 77 y o  SEX: female CODE STATUS: Full Code    DATE OF ENCOUNTER: 6/22/2021    Assessment and Plan     Late onset Alzheimer's disease without behavioral disturbance ([facility] [address] )  -Patient with a history of severe dementia   -Continues on Aricept and Namenda   -Per daughter patient has had progressive decline and has been dependent for most ADLs and all IADLs  - Provide frequent redirection, reorientation, distraction techniques  - continue fall precautions at facility  - continue to assist with ADLs at facility   - Avoid deliriogenic medications such as tramadol, benzodiazepines, anticholinergics,  Benadryl  - Encourage Hydration/ Nutrition  - Implement sleep hygiene, limit night time interuptions, group activities          CKD (chronic kidney disease) stage 4, GFR 15-29 ml/min (HCC)  Lab Results   Component Value Date    EGFR 30 06/21/2021    EGFR 34 06/18/2021    EGFR 32 06/18/2021    CREATININE 1 85 (H) 06/21/2021    CREATININE 1 69 (H) 06/18/2021    CREATININE 1 75 (H) 06/18/2021     Baseline 1 6-1 9  Most recent creatinine 1 97 with a GFR of 28  Per discharge summary patient should have Lasix held indefinitely  Discontinue Lasix  Ensure adequate nutrition/hydration  Avoid nephrotoxic agents  Renally dose medications    Repeat BMP 06/24/2021      All medications and routine orders were reviewed and updated as needed  Chief Complaint     Acute visit     History of Present Illness     Courtney Grey is a 67-year-old female with multiple comorbidities including but not limited to CKD stage 4, Alzheimer's dementia, ambulatory dysfunction seen in collaboration with nursing for an acute visit  Patient was recently discharged from [facility] where she presented with progressive ambulatory dysfunction since January that worsened within the 2 weeks prior to admission    Of note patient does have a history of dementia and family states this has also worsened in the last 2 weeks  Of Note she had not had a bowel movement in 5-6 days received aggressive bowel regimen and had 3-4 large bowel movements in the hospital   Regarding her creatinine she had a bump of 1 85 during her hospitalizations her baseline appears to be 1 6-1 9  Repeat BMP done in facility creatinine 1 97 and GFR 28  Per hospital discharge summary patient was to have Lasix held indefinitely upon admission to SNF  Upon exam today patient is resting comfortably in bed, offers no complaints, is alert and oriented to herself only  Denies chest pain, shortness of breath, N/V/D  States she is eating well and staying hydrated  Denies any bowel or bladder issues  Last BM was 6/21/2021  She appears euvolemic on exam today no edema in her upper and lower extremities lungs are clear to auscultation  The patient's allergies, past medical, surgical, social and family history were reviewed and unchanged  Review of Systems     Review of Systems   Unable to perform ROS: Dementia         Objective     Vitals:  Temp 97 1° pulse 91 respirations 20 /64 96% on room air    Physical Exam  Vitals and nursing note reviewed  Constitutional:       General: She is not in acute distress  Appearance: Normal appearance  HENT:      Head: Normocephalic and atraumatic  Nose: No rhinorrhea  Mouth/Throat:      Mouth: Mucous membranes are moist    Eyes:      General: No scleral icterus  Conjunctiva/sclera: Conjunctivae normal       Pupils: Pupils are equal, round, and reactive to light  Cardiovascular:      Rate and Rhythm: Normal rate and regular rhythm  Pulses: Normal pulses  Heart sounds: No murmur heard  Pulmonary:      Effort: Pulmonary effort is normal  No respiratory distress  Breath sounds: Normal breath sounds  No wheezing, rhonchi or rales     Abdominal:      General: Bowel sounds are normal  There is no distension  Palpations: Abdomen is soft  There is no mass  Tenderness: There is no abdominal tenderness  Hernia: No hernia is present  Musculoskeletal:         General: No swelling or tenderness  Normal range of motion  Lymphadenopathy:      Cervical: No cervical adenopathy  Skin:     General: Skin is warm and dry  Coloration: Skin is not pale  Findings: No rash  Neurological:      Mental Status: She is alert  Mental status is at baseline  She is disoriented  Motor: No weakness  Gait: Gait abnormal    Psychiatric:         Mood and Affect: Mood normal          Behavior: Behavior normal       Comments: Pleasant/cooperative         Pertinent Laboratory/Diagnostic Studies:  Labs:    Collection Date:06/22/2021 98:99  BASIC METABOLIC PNL  GLUCOSE 85 mg/dL 65-99 Final  BUN 31 mg/dL 7-25 H Final  CREATININE 1 97 mg/dL 0 40-1 10 H Final  SODIUM 143 mmol/L 135-145 Final  POTASSIUM 3 8 mmol/L 3 5-5 2 Final  CHLORIDE 112 mmol/L 100-109 H Final  CARBON DIOXIDE 22 mmol/L 23-31 L Final  CALCIUM 10 0 mg/dL 8 5-10 1 Final  ANION GAP 9 3-11 Final  eGFR, NON  AM 24 >60 L Final  eGFR,  AMER 28 >60 L Final  eGFR COMMENT (Note) Final   Units: mL/min per 1 73 square meters   Normal Function or Mild Renal   Disease (if clinically at risk): >or=60   Moderately Decreased: 30-59   Severely Decreased: 15-29   Renal Failure: <15   Please note that the eGFR is based on the CKD-EPI calculation, and is   not intended to be used for drug dosing  Note: Calculated GFR may not be an accurate indicator of renal   function if the patient's renal function is not in a steady state      CBC NO DIFF  HEMOGLOBIN 10 7 g/dL 11 5-14 5 L Final  HEMATOCRIT 32 7 % 35 0-43 0 L Final  WBC 9 9 thou/cmm 4 0-10 0 Final  RBC 3 72 mill/cmm 3 70-4 70 Final  PLATELET COUNT 277 thou/cmm 140-350 Final  MPV 8 1 fL 7 5-11 3 Final  MCV 88 fL  Final  MCV 88 fL  Final  MCH 28 9 pg 26 0-34 0 Final  MCHC 32 8 g/dL 32 0-37 0 Final  RDW 15 6 % 12 0-16 0 Final      Current Medications   Medications reviewed and updated see facility [facility] for details        Current Outpatient Medications:     amLODIPine (NORVASC) 10 mg tablet, TAKE 1 TABLET BY MOUTH EVERY DAY, Disp: 90 tablet, Rfl: 1    Ascorbic Acid (VITAMIN C) 100 MG tablet, Take 100 mg by mouth daily, Disp: , Rfl:     aspirin (ECOTRIN LOW STRENGTH) 81 mg EC tablet, Take 81 mg by mouth daily, Disp: , Rfl:     atorvastatin (LIPITOR) 40 mg tablet, Take 1 tablet (40 mg total) by mouth daily, Disp: 90 tablet, Rfl: 1    Azopt 1 % ophthalmic suspension, , Disp: , Rfl:     Brimonidine Tartrate-Timolol (COMBIGAN OP), Apply to eye, Disp: , Rfl:     buPROPion (WELLBUTRIN SR) 150 mg 12 hr tablet, Take 1 tablet (150 mg total) by mouth 2 (two) times a day, Disp: 180 tablet, Rfl: 1    Calcifediol ER (Rayaldee) 30 MCG CPCR, Take 30 mcg by mouth daily, Disp: 90 capsule, Rfl: 2    cetirizine (ZyrTEC) 10 mg tablet, Take 10 mg by mouth daily, Disp: , Rfl:     citalopram (CeleXA) 40 mg tablet, Take 1 tablet (40 mg total) by mouth daily, Disp: 90 tablet, Rfl: 1    diclofenac sodium (VOLTAREN) 1 %, Apply 2 g topically 3 (three) times a day as needed (Pain), Disp: 100 g, Rfl: 1    Docusate Sodium (COLACE PO), Take by mouth, Disp: , Rfl:     donepezil (ARICEPT) 10 mg tablet, Take 1 tablet (10 mg total) by mouth daily at bedtime, Disp: 90 tablet, Rfl: 1    dorzolamide (TRUSOPT) 2 % ophthalmic solution, 1 drop 3 (three) times a day, Disp: , Rfl:     ferrous sulfate 325 (65 Fe) mg tablet, Take 325 mg by mouth daily with breakfast, Disp: , Rfl:     fluticasone (FLONASE) 50 mcg/act nasal spray, 1 spray into each nostril daily, Disp: 1 g, Rfl: 2    hydrALAZINE (APRESOLINE) 25 mg tablet, TAKE 1 TABLET BY MOUTH THREE TIMES A DAY, Disp: 270 tablet, Rfl: 1    Latanoprostene Bunod (Vyzulta) 0 024 % SOLN, Apply to eye, Disp: , Rfl:     losartan (COZAAR) 25 mg tablet, Take 25 mg by mouth daily, Disp: , Rfl:     meclizine (ANTIVERT) 25 mg tablet, Take 1 tablet (25 mg total) by mouth 2 (two) times a day, Disp: 30 tablet, Rfl: 0    memantine (NAMENDA) 10 mg tablet, TAKE 1 TABLET BY MOUTH TWICE A DAY, Disp: 180 tablet, Rfl: 0    Multiple Vitamins-Minerals (CENTRUM SILVER PO), Take by mouth, Disp: , Rfl:     omeprazole (PriLOSEC) 20 mg delayed release capsule, Take 20 mg by mouth daily, Disp: , Rfl:     polyethylene glycol (MIRALAX) 17 g packet, Take 17 g by mouth daily as needed (constipation), Disp: 1 each, Rfl: 0    risperiDONE (RisperDAL) 0 25 mg tablet, TAKE 1 TABLET (0 25 MG TOTAL) BY MOUTH 2 (TWO) TIMES A DAY, Disp: 60 tablet, Rfl: 0    senna-docusate sodium (SENOKOT S) 8 6-50 mg per tablet, Take 1 tablet by mouth daily at bedtime, Disp: 30 tablet, Rfl: 0    Tradjenta 5 MG TABS, TAKE 1 TABLET DAILY, Disp: 90 tablet, Rfl: 2    traZODone (DESYREL) 50 mg tablet, Take 1 tablet (50 mg total) by mouth daily at bedtime, Disp: 90 tablet, Rfl: 0  No current facility-administered medications for this visit  Plan discussed with Dr Mary Bower noted agreement with assessment and plan  Please note:  Voice-recognition software may have been used in the preparation of this document  Occasional wrong word or "sound-alike" substitutions may have occurred due to the inherent limitations of voice recognition software  Interpretation should be guided by NESSA Hernandez  6/22/2021 12:41 PM

## 2021-06-23 LAB
BACTERIA BLD CULT: NORMAL
BACTERIA BLD CULT: NORMAL

## 2021-06-24 ENCOUNTER — NURSING HOME VISIT (OUTPATIENT)
Dept: GERIATRICS | Facility: OTHER | Age: 76
End: 2021-06-24
Payer: MEDICARE

## 2021-06-24 DIAGNOSIS — N18.4 TYPE 2 DIABETES MELLITUS WITH STAGE 4 CHRONIC KIDNEY DISEASE AND HYPERTENSION (HCC): ICD-10-CM

## 2021-06-24 DIAGNOSIS — E11.22 TYPE 2 DIABETES MELLITUS WITH STAGE 4 CHRONIC KIDNEY DISEASE AND HYPERTENSION (HCC): ICD-10-CM

## 2021-06-24 DIAGNOSIS — F02.80 LATE ONSET ALZHEIMER'S DISEASE WITHOUT BEHAVIORAL DISTURBANCE (HCC): Primary | ICD-10-CM

## 2021-06-24 DIAGNOSIS — I10 ESSENTIAL HYPERTENSION: ICD-10-CM

## 2021-06-24 DIAGNOSIS — R26.2 AMBULATORY DYSFUNCTION: ICD-10-CM

## 2021-06-24 DIAGNOSIS — I12.9 TYPE 2 DIABETES MELLITUS WITH STAGE 4 CHRONIC KIDNEY DISEASE AND HYPERTENSION (HCC): ICD-10-CM

## 2021-06-24 DIAGNOSIS — F33.1 MODERATE EPISODE OF RECURRENT MAJOR DEPRESSIVE DISORDER (HCC): ICD-10-CM

## 2021-06-24 DIAGNOSIS — G30.1 LATE ONSET ALZHEIMER'S DISEASE WITHOUT BEHAVIORAL DISTURBANCE (HCC): Primary | ICD-10-CM

## 2021-06-24 PROCEDURE — 99306 1ST NF CARE HIGH MDM 50: CPT | Performed by: FAMILY MEDICINE

## 2021-06-24 NOTE — PROGRESS NOTES
[address]  [address]   [facility]  History and Physical    NAME: Alena Marvin  AGE: 77 y o  SEX: female [id number]    DATE OF ENCOUNTER: 6/24/2021    Code status:  CPR    Assessment and Plan     1  Late onset Alzheimer's disease without behavioral disturbance ([facility] [address] )     - redirection, reorientation     - assistance with ADLs and supervision     - cont Risperidone 0 25 mg po bid     - cont Memantine 10 mg po qd     - cont donepezil 10 mg po qhs    2  Ambulatory dysfunction     - Fall precautions in place     - PT/OT ordered    3  Moderate episode of recurrent major depressive disorder (HCC)     - stable     - cont trazodone 50 mg po qhs     - cont citalopram 40 mg po qd     - cont Bupropion 150 mg po bid    4  Essential hypertension     - controlled     - cont losartan 25 mg po qd     - cont hydralazine 25 mg po q8     - cont Amlodipine 10 mg po qd    5  Type 2 diabetes mellitus with stage 4 chronic kidney disease and hypertension (HCC)     - cont Tradjenta 5 mg po qd     - cont accuchecks as ordered      All medications and routine orders were reviewed and updated as needed  Plan discussed with: patient and staff    Chief Complaint     Seen for admission at [address] [place] [address]    History of Present Illness     Alena Marvin, a 76 y/o female with history of dementia got admitted to the hospital with worsening generalized weakness and ambulatory dysfunction  She also found to have elevated creatinine at 1 8 and constipation  Her Lasix dose was adjusted  Her overall condition improved  She got discharged to [address] [place] [address] for subacute rehab    she was seen and examined at bedside, stable  She found lying in bed comfortably  She is unable to give history due to dementia  She denies any pain  She lives at home with daughter and family  Staff have no concerns at this time      HISTORY:  Past Medical History:   Diagnosis Date    Anxiety     Cholesterol depletion     Confusion     Dementia (HCC)     Depression     [facility]     Early onset Alzheimer's dementia ([facility] )     Glaucoma     Hypertension     Hypertension     Memory loss      Family History   Problem Relation Age of Onset    Diabetes Mother     Hypertension Mother      Social History     Socioeconomic History    Marital status: Single     Spouse name: Not on file    Number of children: 6    Years of education: 12    Highest education level: Bachelor's degree (e g , BA, AB, BS)   Occupational History    Occupation: retired   Tobacco Use    Smoking status: Former Smoker     Types: Cigarettes     Quit date: 2018     Years since quittin 9    Smokeless tobacco: Never Used   Vaping Use    Vaping Use: Never used   Substance and Sexual Activity    Alcohol use: Not Currently    Drug use: Never    Sexual activity: Not Currently   Other Topics Concern    Not on file   Social History Narrative    Not on file     Social Determinants of Health     Financial Resource Strain: Low Risk     Difficulty of Paying Living Expenses: Not hard at all   Food Insecurity: No Food Insecurity    Worried About Running Out of Food in the Last Year: Never true    [address] in the Last Year: Never true   Transportation Needs: No Transportation Needs    Lack of Transportation (Medical): No    Lack of Transportation (Non-Medical):  No   Physical Activity: Inactive    Days of Exercise per Week: 0 days    Minutes of Exercise per Session: 0 min   Stress: Stress Concern Present    Feeling of Stress : Very much   Social Connections: Unknown    Frequency of Communication with Friends and Family: Once a week    Frequency of Social Gatherings with Friends and Family: Once a week    Attends Confucianist Services: 1 to 4 times per year    Active Member of [employer] or Organizations: No    Attends Club or Organization Meetings: Never    Marital Status: Patient refused   Intimate Partner Violence: Unknown  Fear of Current or Ex-Partner: Not on file    Emotionally Abused: No    Physically Abused: Not on file    Sexually Abused: No       Allergies: Allergies   Allergen Reactions    Codeine        Review of Systems     Review of Systems   Unable to perform ROS: Dementia   As in HPI  Medications and orders     All medications reviewed and updated in Nursing Home EMR  Objective     Vitals: T: 97 3, P: 81, R: 16, BP: 133/60, 96% on RA, Wt: 173 6 lbs    Physical Exam  Vitals and nursing note reviewed  Constitutional:       General: She is not in acute distress  Appearance: Normal appearance  She is well-developed  She is not diaphoretic  HENT:      Head: Normocephalic and atraumatic  Nose: Nose normal       Mouth/Throat:      Mouth: Mucous membranes are moist       Pharynx: Oropharynx is clear  No oropharyngeal exudate  Eyes:      General: No scleral icterus  Right eye: No discharge  Left eye: No discharge  Extraocular Movements: Extraocular movements intact  Conjunctiva/sclera: Conjunctivae normal       Pupils: Pupils are equal, round, and reactive to light  Cardiovascular:      Rate and Rhythm: Normal rate and regular rhythm  Heart sounds: Normal heart sounds  No murmur heard  No friction rub  No gallop  Pulmonary:      Effort: Pulmonary effort is normal  No respiratory distress  Breath sounds: Normal breath sounds  No wheezing or rales  Chest:      Chest wall: No tenderness  Abdominal:      General: Bowel sounds are normal       Palpations: Abdomen is soft  Tenderness: There is no abdominal tenderness  There is no guarding or rebound  Musculoskeletal:         General: No tenderness or deformity  Normal range of motion  Cervical back: Normal range of motion and neck supple  Right lower leg: No edema  Left lower leg: No edema  Skin:     General: Skin is warm and dry  Neurological:      Mental Status: She is alert  Cranial Nerves: No cranial nerve deficit  Comments:  Oriented to self   verbal   able to follow simple commands  Psychiatric:         Mood and Affect: Mood normal          Behavior: Behavior normal       Comments:  Confused and forgetful         Pertinent Laboratory/Diagnostic Studies: The following labs/studies were reviewed please see chart or hospital paperwork for details  Ref Range & Units 6/21/21 0445    Sodium 136 - 145 mmol/L 144    Potassium 3 5 - 5 3 mmol/L 3 9    Chloride 100 - 108 mmol/L 108    CO2 21 - 32 mmol/L 22    ANION GAP 4 - 13 mmol/L 14High     BUN 5 - 25 mg/dL 24    Creatinine 0 60 - 1 30 mg/dL 1  85High     Comment: Standardized to IDMS reference method   Glucose 65 - 140 mg/dL 121       Calcium 8 3 - 10 1 mg/dL 10  2High     eGFR ml/min/1 73sq m 30      Ref Range & Units 6/18/21 0618    WBC 4 31 - 10 16 Thousand/uL 8 25    RBC 3 81 - 5 12 Million/uL 3 93    Hemoglobin 11 5 - 15 4 g/dL 11 2Low     Hematocrit 34 8 - 46 1 % 35 5    MCV 82 - 98 fL 90    MCH 26 8 - 34 3 pg 28 5    MCHC 31 4 - 37 4 g/dL 31 5    RDW 11 6 - 15 1 % 15 1    MPV 8 9 - 12 7 fL 9 1    Platelets 951 - 320 Thousands/uL 285    nRBC /100 WBCs 0    Neutrophils Relative 43 - 75 % 75    Immat GRANS % 0 - 2 % 1    Lymphocytes Relative 14 - 44 % 15    Monocytes Relative 4 - 12 % 8    Eosinophils Relative 0 - 6 % 0    Basophils Relative 0 - 1 % 1    Neutrophils Absolute 1 85 - 7 62 Thousands/µL 6 20    Immature Grans Absolute 0 00 - 0 20 Thousand/uL 0 04    Lymphocytes Absolute 0 60 - 4 47 Thousands/µL 1 26    Monocytes Absolute 0 17 - 1 22 Thousand/µL 0 67    Eosinophils Absolute 0 00 - 0 61 Thousand/µL 0 03    Basophils Absolute 0 00 - 0 10 Thousands/µL 0 05        - Counseling Documentation: patient was counseled regarding: prognosis

## 2021-06-25 DIAGNOSIS — E78.49 OTHER HYPERLIPIDEMIA: ICD-10-CM

## 2021-06-25 RX ORDER — ATORVASTATIN CALCIUM 40 MG/1
TABLET, FILM COATED ORAL
Qty: 90 TABLET | Refills: 1 | Status: SHIPPED | OUTPATIENT
Start: 2021-06-25 | End: 2021-07-06 | Stop reason: SDUPTHER

## 2021-06-29 ENCOUNTER — NURSING HOME VISIT (OUTPATIENT)
Dept: GERIATRICS | Facility: OTHER | Age: 76
End: 2021-06-29
Payer: MEDICARE

## 2021-06-29 DIAGNOSIS — F33.1 MODERATE EPISODE OF RECURRENT MAJOR DEPRESSIVE DISORDER (HCC): ICD-10-CM

## 2021-06-29 DIAGNOSIS — N18.4 CKD (CHRONIC KIDNEY DISEASE) STAGE 4, GFR 15-29 ML/MIN (HCC): ICD-10-CM

## 2021-06-29 DIAGNOSIS — E11.22 TYPE 2 DIABETES MELLITUS WITH STAGE 4 CHRONIC KIDNEY DISEASE AND HYPERTENSION (HCC): Primary | ICD-10-CM

## 2021-06-29 DIAGNOSIS — I10 ESSENTIAL HYPERTENSION: ICD-10-CM

## 2021-06-29 DIAGNOSIS — G30.1 LATE ONSET ALZHEIMER'S DISEASE WITHOUT BEHAVIORAL DISTURBANCE (HCC): ICD-10-CM

## 2021-06-29 DIAGNOSIS — R26.2 AMBULATORY DYSFUNCTION: ICD-10-CM

## 2021-06-29 DIAGNOSIS — K59.01 SLOW TRANSIT CONSTIPATION: ICD-10-CM

## 2021-06-29 DIAGNOSIS — F02.80 LATE ONSET ALZHEIMER'S DISEASE WITHOUT BEHAVIORAL DISTURBANCE (HCC): ICD-10-CM

## 2021-06-29 DIAGNOSIS — N18.4 TYPE 2 DIABETES MELLITUS WITH STAGE 4 CHRONIC KIDNEY DISEASE AND HYPERTENSION (HCC): Primary | ICD-10-CM

## 2021-06-29 DIAGNOSIS — I12.9 TYPE 2 DIABETES MELLITUS WITH STAGE 4 CHRONIC KIDNEY DISEASE AND HYPERTENSION (HCC): Primary | ICD-10-CM

## 2021-06-29 PROBLEM — D63.1 ANEMIA DUE TO STAGE 4 CHRONIC KIDNEY DISEASE (HCC): Status: ACTIVE | Noted: 2020-10-05

## 2021-06-29 PROCEDURE — 99309 SBSQ NF CARE MODERATE MDM 30: CPT | Performed by: NURSE PRACTITIONER

## 2021-06-29 NOTE — ASSESSMENT & PLAN NOTE
BP and HR stable and controlled with current meds  BP range (6/21-29/2021) = 122/62 to 138/68  HR range (6/21-29/2021) = 72 to 91/min  Continue the following meds:  * Amlodipine 10mg daily  * ASA 81mg daily  * Losartan 25mg daily  * Hydralazine 25mg TID  Renal functions stable (6/24/2021)  To follow-up with PCP upon discharge

## 2021-06-29 NOTE — ASSESSMENT & PLAN NOTE
Stable  Adjusting to new environment: sleep  No behavioral disturbance per nursing  Weight gain   Ave meal completion %: 50-75%  Continue the following meds:  * Aricept 10mg daily  * Memantine 10mg BID  * Risperidone 0 25mg BID  Will continue to monitor

## 2021-06-29 NOTE — ASSESSMENT & PLAN NOTE
Lab Results   Component Value Date    EGFR 30 06/21/2021    EGFR 34 06/18/2021    EGFR 32 06/18/2021    CREATININE 1 85 (H) 06/21/2021    CREATININE 1 69 (H) 06/18/2021    CREATININE 1 75 (H) 06/18/2021   Baseline crea: 1 75 (2021)  Current : 1 82 (6/24/2021)  Hbg/Hct level: 10 7 / 32 7 (stable: 6/22/2021)  Continue to avoid hypotensive episodes and use of nephrotoxic medications  Not on diuretics  Nursing to actively offer oral fluids as tolerated    Continue FeSO4 325mg daily + Vit C 250mg daily  Continue MIV daily  To follow-up with nephrology office upon discharge

## 2021-06-29 NOTE — ASSESSMENT & PLAN NOTE
Lab Results   Component Value Date    HGBA1C 5 7 (H) 06/10/2021   Goal: < 8%  FBG range (6/23-29/2021) = 107 to 145  2100 BG range (6/21-28/2021) = 143 to 190  Continue Trajenta 5mg daily

## 2021-06-29 NOTE — PROGRESS NOTES
[address], [address], [facility], [facility]  [phone]    NAME: Jia Shaffer  AGE: 77 y o  SEX: female    Progress Note    Location: OO  POS: 32 (SNF)    Assessment/Plan:    Type 2 diabetes mellitus with stage 4 chronic kidney disease and hypertension (HCC)    Lab Results   Component Value Date    HGBA1C 5 7 (H) 06/10/2021   Goal: < 8%  FBG range (6/23-29/2021) = 107 to 145  2100 BG range (6/21-28/2021) = 143 to 190  Continue Trajenta 5mg daily    Late onset Alzheimer's disease without behavioral disturbance (HCC)  Stable  Adjusting to new environment: sleep  No behavioral disturbance per nursing  Weight gain  Ave meal completion %: 50-75%  Continue the following meds:  * Aricept 10mg daily  * Memantine 10mg BID  * Risperidone 0 25mg BID  Will continue to monitor    Moderate episode of recurrent major depressive disorder (HCC)  Stable  Continue the following meds:  * Wellbutrin SR 150mg BID  * Citalopram 40mg daily  * Trazodone 50mg daily  Alk  Phosph/AST/ALT (WNL: June 2021: [facility] Epic record   Nursing to continue to monitor sleep/mood/behavior and appetite patterns   To follow-up with psychiatry upon discharge (Dr Layla Irvin)    Anemia due to stage 4 chronic kidney disease [facility])  Lab Results   Component Value Date    EGFR 30 06/21/2021    EGFR 34 06/18/2021    EGFR 32 06/18/2021    CREATININE 1 85 (H) 06/21/2021    CREATININE 1 69 (H) 06/18/2021    CREATININE 1 75 (H) 06/18/2021   Baseline crea: 1 75 (2021)  Current : 1 82 (6/24/2021)  Hbg/Hct level: 10 7 / 32 7 (stable: 6/22/2021)  Continue to avoid hypotensive episodes and use of nephrotoxic medications  Not on diuretics  Nursing to actively offer oral fluids as tolerated    Continue FeSO4 325mg daily + Vit C 250mg daily  Continue MIV daily  To follow-up with nephrology office upon discharge    Ambulatory dysfunction  Multi-etiology:  Continue 24/7 [facility] supportive care and management  Continue PT/OT/ST as scheduled  Fall precaution    Essential hypertension  BP and HR stable and controlled with current meds  BP range (6/21-29/2021) = 122/62 to 138/68  HR range (6/21-29/2021) = 72 to 91/min  Continue the following meds:  * Amlodipine 10mg daily  * ASA 81mg daily  * Losartan 25mg daily  * Hydralazine 25mg TID  Renal functions stable (6/24/2021)  To follow-up with PCP upon discharge    Slow transit constipation  Hx of impaction  Reviewed BM log: Last BM documented on 6/256/2021  Start Miralax 17 G daily - HOLD for loose stools  Increase Senna-S 2 tablets at bedtime  D/C PRN Colace  D/C PRN Miralax  Nursing to continue to monitor  Chief complaint / Reason for visit: Follow-up visit    History of Present Illness: This is a 28-year-old female patient currently admitted at [facility] (6/21/2021 to present) following discharge from acute care hospitalization [facility]) with Dx of Ambulatory dysfunction  Patient is seen and examined today to follow up acute and chronic medical conditions as mentioned above and:  Late onset Alzheimer's disease, DM Type 2, Anemia, CKD4 and hypertension  Patient is out of bed for this visit sitting in manual wheelchair in room - just returned from therapy session this morning - presented to be fatigued and sleepy - patient acknowledged not sleeping well at night - acknowledge possibly 2 to new place  Patient poorly engage in this visit due to somnolence versus path fatigue - able to respond to simple assessment questions special yes/no responses  Patient is verbal with clear if otherwise soft voice - oriented to name/birthday and current month only - thinks it is years 2000  Limited ROS assessment on this visit, however patient denies pain, shortness of breath, chest pain, GI/ related concerns  Per nursing patient is relatively new - no event over the weekend - described as stable and no acute medical concerns at this visit      Review of Systems:  Per history of present illness, all other systems reviewed and negative  HISTORY:  Medical Hx: Reviewed, unchanged  Family Hx: Reviewed, unchanged  Soc Hx: Reviewed,  unchanged    ALLERGY: Reviewed, unchanged  Allergies   Allergen Reactions    Codeine         PHYSICAL EXAM:  Vital Signs: T97 0F -P88 -R17 BP: 122/62 SpO2: 95% RA  Weight: 180 0 lbs (6/28/2021) <= 173 6 lbs (6/21/2021)    General: NAD  Obese  Head: Atraumatic  Normocephalic  Eye Exam: anicteric sclera, no discharge, PERRLA, No injection  Oral Exam: moist mucous membranes, no buccaloropharyngeal erythema, palatine tonsils WNL  Poor dentition: missing teeth  Neck Exam: no anterior cervical lymphadenopathy noted, neck supple  Cardiovascular: regular rate, regular rhythm, no murmurs, rubs, or gallops  Pulmonary: no wheeze, no rhonchi, no rales  No chest tenderness  Abdominal: soft, non-tender, nondistended, bowel sounds audible x 4 quadrants  : Non distended bladder  Extremities and skin: no edema noted, no rashes  Intact skin  Neurological: alert, cooperative and responsive, Oriented x 3, moving all 4 extremities symmetrically    Laboratory results / Imaging reviewed: Hard copy/ies in medical chart:    * BMP (6/24/2021) = WNL except:  BUN: 40 (H)  Crea: 1 82 (H)  Cl: 114 (H)  GFR: 31 (L)    * CBC w/o diff (6/22/2021) = WNL except:  Hbg: 10 7 (L)  Hct: 32 7 (L)    Current Medications: All medications reviewed and updated in Nursing Home Chart    Please note:  Voice-recognition software may have been used in the preparation of this document  Occasional wrong word or "sound-alike" substitutions may have occurred due to the inherent limitations of voice recognition software  Interpretation should be guided by context      NESSA Cope  6/29/2021

## 2021-06-29 NOTE — ASSESSMENT & PLAN NOTE
Stable  Continue the following meds:  * Wellbutrin SR 150mg BID  * Citalopram 40mg daily  * Trazodone 50mg daily  Alk   Phosph/AST/ALT (WNL: June 2021: [facility] Epic record   Nursing to continue to monitor sleep/mood/behavior and appetite patterns   To follow-up with psychiatry upon discharge (Dr Arevalo Certain)

## 2021-06-29 NOTE — ASSESSMENT & PLAN NOTE
Multi-etiology:  Continue 24/7 SNF supportive care and management  Continue PT/OT/ST as scheduled  Fall precaution

## 2021-07-03 DIAGNOSIS — F02.81 LATE ONSET ALZHEIMER'S DISEASE WITH BEHAVIORAL DISTURBANCE (HCC): ICD-10-CM

## 2021-07-03 DIAGNOSIS — G30.1 LATE ONSET ALZHEIMER'S DISEASE WITH BEHAVIORAL DISTURBANCE (HCC): ICD-10-CM

## 2021-07-05 RX ORDER — MEMANTINE HYDROCHLORIDE 10 MG/1
TABLET ORAL
Qty: 180 TABLET | Refills: 0 | Status: SHIPPED | OUTPATIENT
Start: 2021-07-05 | End: 2021-07-06 | Stop reason: SDUPTHER

## 2021-07-06 ENCOUNTER — TELEPHONE (OUTPATIENT)
Dept: GERIATRICS | Age: 76
End: 2021-07-06

## 2021-07-06 ENCOUNTER — NURSING HOME VISIT (OUTPATIENT)
Dept: GERIATRICS | Facility: OTHER | Age: 76
End: 2021-07-06
Payer: MEDICARE

## 2021-07-06 DIAGNOSIS — F02.80 LATE ONSET ALZHEIMER'S DISEASE WITHOUT BEHAVIORAL DISTURBANCE (HCC): Primary | ICD-10-CM

## 2021-07-06 DIAGNOSIS — N18.4 TYPE 2 DIABETES MELLITUS WITH STAGE 4 CHRONIC KIDNEY DISEASE AND HYPERTENSION (HCC): ICD-10-CM

## 2021-07-06 DIAGNOSIS — R26.2 AMBULATORY DYSFUNCTION: ICD-10-CM

## 2021-07-06 DIAGNOSIS — K59.01 SLOW TRANSIT CONSTIPATION: ICD-10-CM

## 2021-07-06 DIAGNOSIS — I10 ESSENTIAL HYPERTENSION: ICD-10-CM

## 2021-07-06 DIAGNOSIS — I12.9 TYPE 2 DIABETES MELLITUS WITH STAGE 4 CHRONIC KIDNEY DISEASE AND HYPERTENSION (HCC): ICD-10-CM

## 2021-07-06 DIAGNOSIS — R42 VERTIGO: ICD-10-CM

## 2021-07-06 DIAGNOSIS — E11.22 TYPE 2 DIABETES MELLITUS WITH STAGE 4 CHRONIC KIDNEY DISEASE AND HYPERTENSION (HCC): ICD-10-CM

## 2021-07-06 DIAGNOSIS — E78.49 OTHER HYPERLIPIDEMIA: ICD-10-CM

## 2021-07-06 DIAGNOSIS — E55.9 VITAMIN D DEFICIENCY: ICD-10-CM

## 2021-07-06 DIAGNOSIS — G30.1 LATE ONSET ALZHEIMER'S DISEASE WITHOUT BEHAVIORAL DISTURBANCE (HCC): Primary | ICD-10-CM

## 2021-07-06 DIAGNOSIS — F33.1 MODERATE EPISODE OF RECURRENT MAJOR DEPRESSIVE DISORDER (HCC): ICD-10-CM

## 2021-07-06 PROBLEM — Z12.11 ENCOUNTER FOR SCREENING COLONOSCOPY: Status: RESOLVED | Noted: 2020-09-22 | Resolved: 2021-07-06

## 2021-07-06 PROBLEM — R41.0 CONFUSION: Status: RESOLVED | Noted: 2021-05-17 | Resolved: 2021-07-06

## 2021-07-06 PROBLEM — N30.00 ACUTE CYSTITIS WITHOUT HEMATURIA: Status: RESOLVED | Noted: 2020-08-13 | Resolved: 2021-07-06

## 2021-07-06 PROBLEM — J30.9 ALLERGIC RHINITIS: Status: RESOLVED | Noted: 2021-05-18 | Resolved: 2021-07-06

## 2021-07-06 PROCEDURE — 99316 NF DSCHRG MGMT 30 MIN+: CPT | Performed by: NURSE PRACTITIONER

## 2021-07-06 RX ORDER — AMLODIPINE BESYLATE 10 MG/1
10 TABLET ORAL DAILY
Qty: 14 TABLET | Refills: 0 | Status: SHIPPED | OUTPATIENT
Start: 2021-07-06 | End: 2021-07-06

## 2021-07-06 RX ORDER — DONEPEZIL HYDROCHLORIDE 10 MG/1
10 TABLET, FILM COATED ORAL
Qty: 14 TABLET | Refills: 0 | Status: SHIPPED | OUTPATIENT
Start: 2021-07-06 | End: 2021-07-20 | Stop reason: SDUPTHER

## 2021-07-06 RX ORDER — HYDRALAZINE HYDROCHLORIDE 25 MG/1
25 TABLET, FILM COATED ORAL 3 TIMES DAILY
Qty: 42 TABLET | Refills: 0 | Status: SHIPPED | OUTPATIENT
Start: 2021-07-06 | End: 2021-08-30 | Stop reason: SDUPTHER

## 2021-07-06 RX ORDER — AMLODIPINE BESYLATE 10 MG/1
TABLET ORAL
Qty: 90 TABLET | Refills: 1 | Status: SHIPPED | OUTPATIENT
Start: 2021-07-06 | End: 2022-03-29

## 2021-07-06 RX ORDER — BUPROPION HYDROCHLORIDE 150 MG/1
150 TABLET, EXTENDED RELEASE ORAL 2 TIMES DAILY
Qty: 28 TABLET | Refills: 0 | Status: SHIPPED | OUTPATIENT
Start: 2021-07-06 | End: 2021-08-30 | Stop reason: SDUPTHER

## 2021-07-06 RX ORDER — MECLIZINE HYDROCHLORIDE 25 MG/1
25 TABLET ORAL 2 TIMES DAILY
Qty: 28 TABLET | Refills: 0 | Status: SHIPPED | OUTPATIENT
Start: 2021-07-06 | End: 2021-08-30 | Stop reason: SDUPTHER

## 2021-07-06 RX ORDER — LOSARTAN POTASSIUM 25 MG/1
25 TABLET ORAL DAILY
Qty: 14 TABLET | Refills: 0 | Status: SHIPPED | OUTPATIENT
Start: 2021-07-06 | End: 2021-08-30 | Stop reason: SDUPTHER

## 2021-07-06 RX ORDER — RISPERIDONE 0.25 MG/1
0.25 TABLET, FILM COATED ORAL 2 TIMES DAILY
Qty: 28 TABLET | Refills: 0 | Status: SHIPPED | OUTPATIENT
Start: 2021-07-06 | End: 2021-07-20 | Stop reason: SDUPTHER

## 2021-07-06 RX ORDER — TRAZODONE HYDROCHLORIDE 50 MG/1
50 TABLET ORAL
Qty: 14 TABLET | Refills: 0 | Status: SHIPPED | OUTPATIENT
Start: 2021-07-06 | End: 2021-08-30 | Stop reason: SDUPTHER

## 2021-07-06 RX ORDER — CITALOPRAM 40 MG/1
40 TABLET ORAL DAILY
Qty: 14 TABLET | Refills: 0 | Status: SHIPPED | OUTPATIENT
Start: 2021-07-06 | End: 2021-08-30 | Stop reason: SDUPTHER

## 2021-07-06 RX ORDER — CALCIFEDIOL 30 UG/1
30 CAPSULE, EXTENDED RELEASE ORAL DAILY
Qty: 14 CAPSULE | Refills: 0 | Status: SHIPPED | OUTPATIENT
Start: 2021-07-06 | End: 2021-08-30 | Stop reason: SDUPTHER

## 2021-07-06 RX ORDER — ATORVASTATIN CALCIUM 40 MG/1
40 TABLET, FILM COATED ORAL DAILY
Qty: 14 TABLET | Refills: 0 | Status: SHIPPED | OUTPATIENT
Start: 2021-07-06 | End: 2021-08-30 | Stop reason: SDUPTHER

## 2021-07-06 RX ORDER — LINAGLIPTIN 5 MG/1
5 TABLET, FILM COATED ORAL DAILY
Qty: 14 TABLET | Refills: 0 | Status: SHIPPED | OUTPATIENT
Start: 2021-07-06 | End: 2021-08-30 | Stop reason: SDUPTHER

## 2021-07-06 RX ORDER — MEMANTINE HYDROCHLORIDE 10 MG/1
10 TABLET ORAL 2 TIMES DAILY
Qty: 28 TABLET | Refills: 0 | Status: SHIPPED | OUTPATIENT
Start: 2021-07-06 | End: 2021-08-30 | Stop reason: SDUPTHER

## 2021-07-06 NOTE — ASSESSMENT & PLAN NOTE
Lab Results   Component Value Date    HGBA1C 5 7 (H) 06/10/2021   Blood glucose log reviewed-stable   Per geriatric guidelines, goal HgA1c is > 7 5 to prevent hypoglycemic event in the older adult with cognitive decline  · Continue Tradjenta 5 mg daily  · Accu-Cheks twice a day  · Avoid hypoglycemia  · Creatinine stable at 1 76(baseline 1 6-1 9)  · Renally dose medications   · Avoid nephrotoxic agents  · Outpatient follow up with PCP and Nephrology

## 2021-07-06 NOTE — PROGRESS NOTES
[facility]  [address]  [phone]  [address]  Code 31    NAME: Carroll Stephenson  AGE: 77 y o  SEX: female   CODE STATUS:  DNR DNI    DATE OF ADMISSION:  6/21/2021   DATE OF DISCHARGE:  7/7/2021   DISCHARGE DISPOSITION:  Home with family and home health services    Reason for admission: Patient was admitted from [facility] for rehabilitation after hospitalization for ambulatory dysfunction  Course of stay:   Cornelius Yang is a 60-year-old female with multiple comorbidities including but not limited to CKD stage 4, Alzheimer's dementia, ambulatory dysfunction seen in collaboration with nursing for SNF visit      Patient was recently discharged from [facility] where she presented with progressive ambulatory dysfunction since January that worsened within the 2 weeks prior to admission  Of note patient does have a history of dementia and family states this has also worsened in the last 2 weeks  Her creatinine had a bump of 1 85 during her hospitalizations her baseline appears to be 1 6-1 9  Repeat BMP done in facility creatinine 1 97 and GFR 28  Per hospital discharge summary patient was to have Lasix held indefinitely upon admission to SNF      Upon exam today patient is resting comfortably in [place] chair at nurses station  Patient is a poor historian due to dementia  Patient offers no complaints today  Patient is consuming about 25-75% of her meals per facility record  She is incontinent of bowel and bladder  LBM 7/6/2021  She has had 1-2 bowel movements the last 3 days  Per PT notes,  patient is a moderate assist for bed mobility and is a mod assist x2 for sit to stand transfers patient utilizes a rolling walker and max assist to ambulate 10 ft  Per OT notes, patient will require 24 hour supervision/assistance and would benefit from home health services    She is a moderate assist for oral hygiene and requires max assist for toileting and bathing and dressing  No concerns from nursing at this time  ROS:  Review of Systems   Unable to perform ROS: Dementia       PHYSICAL EXAM:  VITALS:  Temp 97 2° pulse 71 respirations 18 /67 O2 97% on room air  Physical Exam  Vitals and nursing note reviewed  Constitutional:       General: She is not in acute distress  Appearance: Normal appearance  HENT:      Head: Normocephalic and atraumatic  Nose: No congestion or rhinorrhea  Mouth/Throat:      Mouth: Mucous membranes are dry  Eyes:      General: No scleral icterus  Conjunctiva/sclera: Conjunctivae normal       Comments: Cloudy due to glaucoma   Cardiovascular:      Rate and Rhythm: Normal rate and regular rhythm  Pulses: Normal pulses  Heart sounds: Normal heart sounds  No murmur heard  Pulmonary:      Effort: Pulmonary effort is normal  No respiratory distress  Breath sounds: Normal breath sounds  No wheezing, rhonchi or rales  Abdominal:      General: Bowel sounds are normal  There is no distension  Palpations: Abdomen is soft  There is no mass  Tenderness: There is no abdominal tenderness  Hernia: No hernia is present  Musculoskeletal:         General: No swelling or tenderness  Lymphadenopathy:      Cervical: No cervical adenopathy  Skin:     General: Skin is warm and dry  Coloration: Skin is not pale  Findings: No rash  Neurological:      General: No focal deficit present  Mental Status: She is alert  Mental status is at baseline  She is disoriented  Motor: Weakness present  Gait: Gait abnormal       Comments: Alert and oriented to self, disoriented to place and time      Psychiatric:         Mood and Affect: Mood normal          Behavior: Behavior normal          Admission Diagnoses:   Problem List Items Addressed This Visit        Digestive    Slow transit constipation     · History of impaction   · Last BM 7/6/2021   · Continue MiraLax, senna, Colace hold for loose stools  · Ensure adequate hydration  · Encourage increase fiber intake            Endocrine    Type 2 diabetes mellitus with stage 4 chronic kidney disease and hypertension (HCC)       Lab Results   Component Value Date    HGBA1C 5 7 (H) 06/10/2021   Blood glucose log reviewed-stable   Per geriatric guidelines, goal HgA1c is > 7 5 to prevent hypoglycemic event in the older adult with cognitive decline  · Continue Tradjenta 5 mg daily  · Accu-Cheks twice a day  · Avoid hypoglycemia  · Creatinine stable at 1 76(baseline 1 6-1 9)  · Renally dose medications   · Avoid nephrotoxic agents  · Outpatient follow up with PCP and Nephrology          Relevant Medications    amLODIPine (NORVASC) 10 mg tablet    hydrALAZINE (APRESOLINE) 25 mg tablet    losartan (COZAAR) 25 mg tablet    linaGLIPtin (Tradjenta) 5 MG TABS       Cardiovascular and Mediastinum    Essential hypertension     · BP log reviewed -stable  · Continue amlodipine+ hydralazine+ losartan  · PCP follow-up after discharge from [facility]           Relevant Medications    amLODIPine (NORVASC) 10 mg tablet    hydrALAZINE (APRESOLINE) 25 mg tablet    losartan (COZAAR) 25 mg tablet       Nervous and Auditory    Late onset Alzheimer's disease without behavioral disturbance ([facility] [address] ) - Primary     · History of progressive dementia  · Patient alert to self only on exam today-disoriented to time and place  · Continue Aricept and Namenda  · Provide frequent redirection, reorientation, distraction techniques  · Continue fall precautions  · Continue to assist with ADLs   · Avoid deliriogenic medications such as tramadol, benzodiazepines, anticholinergics,  Benadryl  · Encourage Hydration/ Nutrition  · Implement sleep hygiene, limit night time interuptions, group activities  · Spoke with daughterMayte on the phone who will be taking patient home to care for her     · PCP follow up after discharge from SNF         Relevant Medications    donepezil (ARICEPT) 10 mg tablet    citalopram (CeleXA) 40 mg tablet    memantine (NAMENDA) 10 mg tablet    risperiDONE (RisperDAL) 0 25 mg tablet    traZODone (DESYREL) 50 mg tablet    buPROPion (WELLBUTRIN SR) 150 mg 12 hr tablet       Other    Moderate episode of recurrent major depressive disorder ([facility] [address] )     · Stable-patient is eating and drinking well per facility record  · Patient is pleasant and able to follow commands on exam today  · Continue Wellbutrin+ citalopram+ trazodone  · Follow-up with Psychiatry( Dr Bennye Eisenmenger)  and PCP as outpatient            Relevant Medications    donepezil (ARICEPT) 10 mg tablet    citalopram (CeleXA) 40 mg tablet    memantine (NAMENDA) 10 mg tablet    risperiDONE (RisperDAL) 0 25 mg tablet    traZODone (DESYREL) 50 mg tablet    buPROPion (WELLBUTRIN SR) 150 mg 12 hr tablet    Vitamin D deficiency     · Continues on calcifediol ER  · Continue routine follow-up with nephrology         Relevant Medications    Calcifediol ER (Rayaldee) 30 MCG CPCR    Other hyperlipidemia     · Stable on exam today   · Continue atorvastatin   · PCP follow-up as outpatient         Relevant Medications    atorvastatin (LIPITOR) 40 mg tablet    Vertigo     · Patient is a poor historian due to dementia  · No reports of dizziness from patient or nursing staff  · Continue meclizine p r n  for dizziness/vertigo  · Continue to encourage slow position changes  · Home health services to be set up at discharge  · Outpatient PCP follow-up         Relevant Medications    meclizine (ANTIVERT) 25 mg tablet    Ambulatory dysfunction     · Multifactorial in the setting of progressing dementia, recent hospitalizations, polypharmacy  · Continue fall precaution  · Continue to assist with ADLs  · Per PT notes,  patient is a moderate assist for bed mobility and is a mod assist x2 for sit to stand transfers patient utilizes a rolling walker and max assist to ambulate 10 ft     ·  Per OT notes, patient will require 24 hour supervision/assistance and would benefit from home health services  She is a moderate assist for oral hygiene and requires max assist for toileting and bathing and dressing  Follow-up Recommendations:  Outpatient Follow up with PCP in the next 2 weeks, Nephrology scheduled 7/13/2021, Ortho appt scheduled 7/20/21     Labs and testing performed during stay:  Reviewed in facility chart    Discharge Medications: See discharge medication list which was reviewed and signed        Current Outpatient Medications:     amLODIPine (NORVASC) 10 mg tablet, TAKE 1 TABLET BY MOUTH EVERY DAY, Disp: 90 tablet, Rfl: 1    Ascorbic Acid (VITAMIN C) 100 MG tablet, Take 100 mg by mouth daily, Disp: , Rfl:     aspirin (ECOTRIN LOW STRENGTH) 81 mg EC tablet, Take 81 mg by mouth daily, Disp: , Rfl:     atorvastatin (LIPITOR) 40 mg tablet, TAKE 1 TABLET BY MOUTH EVERY DAY, Disp: 90 tablet, Rfl: 1    Azopt 1 % ophthalmic suspension, , Disp: , Rfl:     Brimonidine Tartrate-Timolol (COMBIGAN OP), Apply to eye, Disp: , Rfl:     buPROPion (WELLBUTRIN SR) 150 mg 12 hr tablet, Take 1 tablet (150 mg total) by mouth 2 (two) times a day, Disp: 180 tablet, Rfl: 1    Calcifediol ER (Rayaldee) 30 MCG CPCR, Take 30 mcg by mouth daily, Disp: 90 capsule, Rfl: 2    cetirizine (ZyrTEC) 10 mg tablet, Take 10 mg by mouth daily, Disp: , Rfl:     citalopram (CeleXA) 40 mg tablet, Take 1 tablet (40 mg total) by mouth daily, Disp: 90 tablet, Rfl: 1    diclofenac sodium (VOLTAREN) 1 %, Apply 2 g topically 3 (three) times a day as needed (Pain), Disp: 100 g, Rfl: 1    Docusate Sodium (COLACE PO), Take by mouth, Disp: , Rfl:     donepezil (ARICEPT) 10 mg tablet, Take 1 tablet (10 mg total) by mouth daily at bedtime, Disp: 90 tablet, Rfl: 1    dorzolamide (TRUSOPT) 2 % ophthalmic solution, 1 drop 3 (three) times a day, Disp: , Rfl:     ferrous sulfate 325 (65 Fe) mg tablet, Take 325 mg by mouth daily with breakfast, Disp: , Rfl:    fluticasone (FLONASE) 50 mcg/act nasal spray, 1 spray into each nostril daily, Disp: 1 g, Rfl: 2    hydrALAZINE (APRESOLINE) 25 mg tablet, TAKE 1 TABLET BY MOUTH THREE TIMES A DAY, Disp: 270 tablet, Rfl: 1    Latanoprostene Bunod (Vyzulta) 0 024 % SOLN, Apply to eye, Disp: , Rfl:     losartan (COZAAR) 25 mg tablet, Take 25 mg by mouth daily, Disp: , Rfl:     meclizine (ANTIVERT) 25 mg tablet, Take 1 tablet (25 mg total) by mouth 2 (two) times a day, Disp: 30 tablet, Rfl: 0    memantine (NAMENDA) 10 mg tablet, TAKE 1 TABLET BY MOUTH TWICE A DAY, Disp: 180 tablet, Rfl: 0    Multiple Vitamins-Minerals (CENTRUM SILVER PO), Take by mouth, Disp: , Rfl:     omeprazole (PriLOSEC) 20 mg delayed release capsule, Take 20 mg by mouth daily, Disp: , Rfl:     polyethylene glycol (MIRALAX) 17 g packet, Take 17 g by mouth daily as needed (constipation), Disp: 1 each, Rfl: 0    risperiDONE (RisperDAL) 0 25 mg tablet, TAKE 1 TABLET (0 25 MG TOTAL) BY MOUTH 2 (TWO) TIMES A DAY, Disp: 60 tablet, Rfl: 0    senna-docusate sodium (SENOKOT S) 8 6-50 mg per tablet, Take 1 tablet by mouth daily at bedtime, Disp: 30 tablet, Rfl: 0    Tradjenta 5 MG TABS, TAKE 1 TABLET DAILY, Disp: 90 tablet, Rfl: 2    traZODone (DESYREL) 50 mg tablet, Take 1 tablet (50 mg total) by mouth daily at bedtime, Disp: 90 tablet, Rfl: 0     Discussion with patient/family and further instructions:  -Fall precautions  -Aspiration precautions  -Bleeding precautions  -Monitor for signs/symptoms of infection  -Medication list was reviewed and signed      Status at time of discharge: Stable    Plan discussed with Dr Jose J Ayers noted agreement with assessment and plan  Billing based on time  Time spent on unit, 40 minutes  Time spent counseling pt on debility/condition, 30 minutes  Please note:  Voice-recognition software may have been used in the preparation of this document    Occasional wrong word or "sound-alike" substitutions may have occurred due to the inherent limitations of voice recognition software  Interpretation should be guided by context          NESSA Polk  7/6/20219:07 AM

## 2021-07-06 NOTE — ASSESSMENT & PLAN NOTE
· History of progressive dementia  · Patient alert to self only on exam today-disoriented to time and place  · Continue Aricept and Namenda  · Provide frequent redirection, reorientation, distraction techniques  · Continue fall precautions  · Continue to assist with ADLs   · Avoid deliriogenic medications such as tramadol, benzodiazepines, anticholinergics,  Benadryl  · Encourage Hydration/ Nutrition  · Implement sleep hygiene, limit night time interuptions, group activities  · Spoke with daughter, Mayte Ag on the phone who will be taking patient home to care for her     · PCP follow up after discharge from SNF

## 2021-07-06 NOTE — ASSESSMENT & PLAN NOTE
· Patient denies pain on exam today however patient is a poor historian due to dementia  · Continue Tylenol p r n  with Voltaren gel  · Outpatient appointment with Orthopedics scheduled 7/20/2021 for injections per patient's daughter

## 2021-07-06 NOTE — ASSESSMENT & PLAN NOTE
· Stable-patient is eating and drinking well per facility record  · Patient is pleasant and able to follow commands on exam today  · Continue Wellbutrin+ citalopram+ trazodone  · Follow-up with Psychiatry( Dr Will Leblanc)  and PCP as outpatient

## 2021-07-06 NOTE — ASSESSMENT & PLAN NOTE
· Patient is a poor historian due to dementia  · No reports of dizziness from patient or nursing staff  · Continue meclizine p r n  for dizziness/vertigo  · Continue to encourage slow position changes  · Home health services to be set up at discharge  · Outpatient PCP follow-up

## 2021-07-06 NOTE — ASSESSMENT & PLAN NOTE
· BP log reviewed -stable  · Continue amlodipine+ hydralazine+ losartan  · PCP follow-up after discharge from SNF

## 2021-07-06 NOTE — ASSESSMENT & PLAN NOTE
· Multifactorial in the setting of progressing dementia, recent hospitalizations, polypharmacy  · Continue fall precaution  · Continue to assist with ADLs  · Per PT notes,  patient is a moderate assist for bed mobility and is a mod assist x2 for sit to stand transfers patient utilizes a rolling walker and max assist to ambulate 10 ft  ·  Per OT notes, patient will require 24 hour supervision/assistance and would benefit from home health services  She is a moderate assist for oral hygiene and requires max assist for toileting and bathing and dressing

## 2021-07-06 NOTE — TELEPHONE ENCOUNTER
[place] from [facility], [phone], contacted office to schedule an appointment for patient  Patient is being discharged from [facility] on 7/7/21  This MR is not certain if TCM visit, but scheduled as such  Routed to [address] [place] [address] Team for TCM event

## 2021-07-07 ENCOUNTER — TRANSITIONAL CARE MANAGEMENT (OUTPATIENT)
Dept: GERIATRICS | Age: 76
End: 2021-07-07

## 2021-07-08 ENCOUNTER — OFFICE VISIT (OUTPATIENT)
Dept: GERIATRICS | Age: 76
End: 2021-07-08
Payer: MEDICARE

## 2021-07-08 ENCOUNTER — TELEPHONE (OUTPATIENT)
Dept: GERIATRICS | Age: 76
End: 2021-07-08

## 2021-07-08 DIAGNOSIS — R26.81 GAIT INSTABILITY: ICD-10-CM

## 2021-07-08 DIAGNOSIS — R26.2 AMBULATORY DYSFUNCTION: Primary | ICD-10-CM

## 2021-07-08 DIAGNOSIS — G30.1 LATE ONSET ALZHEIMER'S DISEASE WITHOUT BEHAVIORAL DISTURBANCE (HCC): ICD-10-CM

## 2021-07-08 DIAGNOSIS — F02.80 LATE ONSET ALZHEIMER'S DISEASE WITHOUT BEHAVIORAL DISTURBANCE (HCC): ICD-10-CM

## 2021-07-08 DIAGNOSIS — I10 ESSENTIAL HYPERTENSION: ICD-10-CM

## 2021-07-08 DIAGNOSIS — R32 URINARY INCONTINENCE, UNSPECIFIED TYPE: ICD-10-CM

## 2021-07-08 DIAGNOSIS — I12.9 TYPE 2 DIABETES MELLITUS WITH STAGE 4 CHRONIC KIDNEY DISEASE AND HYPERTENSION (HCC): ICD-10-CM

## 2021-07-08 DIAGNOSIS — E11.22 TYPE 2 DIABETES MELLITUS WITH STAGE 4 CHRONIC KIDNEY DISEASE AND HYPERTENSION (HCC): ICD-10-CM

## 2021-07-08 DIAGNOSIS — N18.4 ANEMIA DUE TO STAGE 4 CHRONIC KIDNEY DISEASE (HCC): ICD-10-CM

## 2021-07-08 DIAGNOSIS — D63.1 ANEMIA DUE TO STAGE 4 CHRONIC KIDNEY DISEASE (HCC): ICD-10-CM

## 2021-07-08 DIAGNOSIS — N18.4 TYPE 2 DIABETES MELLITUS WITH STAGE 4 CHRONIC KIDNEY DISEASE AND HYPERTENSION (HCC): ICD-10-CM

## 2021-07-08 DIAGNOSIS — F33.1 MODERATE EPISODE OF RECURRENT MAJOR DEPRESSIVE DISORDER (HCC): ICD-10-CM

## 2021-07-08 PROCEDURE — 99496 TRANSJ CARE MGMT HIGH F2F 7D: CPT | Performed by: STUDENT IN AN ORGANIZED HEALTH CARE EDUCATION/TRAINING PROGRAM

## 2021-07-08 RX ORDER — DIAPER,BRIEF,ADULT, DISPOSABLE
EACH MISCELLANEOUS
Qty: 180 EACH | Refills: 5 | Status: SHIPPED | OUTPATIENT
Start: 2021-07-08

## 2021-07-08 NOTE — PROGRESS NOTES
Assessment/Plan:        Problem List Items Addressed This Visit        Endocrine    Type 2 diabetes mellitus with stage 4 chronic kidney disease and hypertension ([facility] )       Lab Results   Component Value Date    HGBA1C 5 7 (H) 06/10/2021     Controlled   Continue linagliptin 5 mg daily  Avoid liver toxic agents  Medications to be renally dosed  Follow up with Nephrology for CKD 4   Will continue to monitor            Cardiovascular and Mediastinum    Essential hypertension      No blood pressure reading available today during tele video visit  Continue losartan 25 mg daily   Continue aspirin 81 mg daily   Continue hydralazine 25mg tid  Continue amlodipine 10 mg daily  Recommended diet is a diabetic, heart healthy diet            Nervous and Auditory    Late onset Alzheimer's disease without behavioral disturbance (HCC)     History of severe dementia  Continue Aricept, Namenda  Given recent hospitalization, short-term rehabilitation, progressively worsening debility and cognition, this patient meets criteria for placement into a higher level of care  In doing so she will have provision of meals, supervised medication administration and the ability to remain active mentally, physically and socially  Alternatively if the patient is to remain at home, she meets criteria for 24/7 supervised care given her dependency in all ADLs and IADLs      The patient also has difficulty ambulating and requires assistance as she is a high falls risk  Reorientation and redirection as needed   Manage chronic conditions  Maintain Falls precautions  Encourage patient to remain active mentally, physically and socially  Patient to participate in cognitively challenging exercises able         Relevant Medications    Incontinence Supply Disposable (Depend Underwear Large/XL) MISC       Other    Moderate episode of recurrent major depressive disorder ([facility] )     Currently being managed by Psychology   Continue Wellbutrin, citalopram, trazodone, Celexa  No overt changes in mood, personality or behavior   Continue social support by family and friends         Anemia due to stage 4 chronic kidney disease (HCC)      Continue ferrous sulfate daily   No acute blood loss   Continue routine follow-up with Nephrology for CKD 4         Gait instability     Patient with persisting ambulatory dysfunction   VNA services to start tomorrow  Maintain Falls precautions   Continue plan for physical therapy for gait training, balance and strengthening            Ambulatory dysfunction - Primary     Recent hospitalization for progressively worsening ambulation and debility   Etiology multifactorial given polypharmacy versus severe dementia vs chronic comorbid conditions  CTH:small meningioma  Atrophy  Moderate microangiopathic change    Patient has had an overall progression of her Alzheimer's disease requiring 24/7 supervised care  Maintain Falls precautions  Patient did complete short-term rehabilitation however this was discontinued due to insurance coverage  VNA Services to come in tomorrow  Recommend continued ST/OT /PT  Encourage patient to remain active mentally, physically and socially         Urine incontinence      Recommend scheduled toileting every 2 hours once a week  Avoid fluid intake 2 hours prior to bedtime to avoid nocturnal incontinence  All other depends undergarments           Relevant Medications    Incontinence Supply Disposable (Depend Underwear Large/XL) MISC             Reason for visit is   Transition of care    Encounter provider Manuel Samuel MD       Provider located at [address] [place] [address] [address] [place] [address]  [phone]      Recent Visits  Date Type Provider Dept   07/08/21 Telephone [address]   07/08/21 Office Visit Manuel Samuel  [facility] [address]   07/06/21 Telephone [facility] recent visits within past 7 days and meeting all other requirements  Future Appointments  No visits were found meeting these conditions  Showing future appointments within next 150 days and meeting all other requirements       After connecting through [employer], the patient was identified by name and date of birth  Moe Hayden was informed that this is a telemedicine visit and that the visit is being conducted through [employer] and patient was informed that this is a secure, HIPAA-compliant platform  She agrees to proceed     My office door was closed  No one else was in the room  She acknowledged consent and understanding of privacy and security of the video platform  The patient has agreed to participate and understands they can discontinue the visit at any time  Patient is aware this is a billable service  Subjective:     Patient ID: Moe Hayden is a 77 y o  female  HPI    This is a 28-year-old female with type 2 diabetes, CKD 4, severe Alzheimer's disease, ambulatory dysfunction and overall debility who presents for her transition of care visit after recent hospitalization and short-term rehabilitation  The patient was admitted to hospital from 06/17/2021 - 06/21/2021 for progressively worsening ambulatory dysfunction and debility  She was also noted to have constipation for which she was started on an aggressive bowel regimen with subsequent bowel movements  Creatinine was slightly elevated from her baseline for which hydration was encouraged  She was then discharged to [facility]  Today daughter expresses her displeasure with the patient's short-term rehabilitation stay as she had sustained 2 falls during that time  Per medical records, the patient's creatinine during her short-term rehabilitation remained at 1 97 and the patient has previously followed with Nephrology as an outpatient   Prior to her hospitalization, I did discuss in detail with the patient's daughter who herself is a  and has had significant caregiver burnout in taking care of her mother, that the patient certainly meets criteria for placement into a higher level of care such as a memory care unit  In doing so, she will have provision of meals, supervised medication administration and the ability to remain active mentally, physically and socially  The patient is dependent in all ADLs and IADLs  Along with recent hospitalization, CVA dementia and chronic comorbidities, she certainly will require 24/7 supervised care she is also high falls risk  Per daughter since returning home, the patient remains significantly debilitated and has difficulty ambulating  VNA nursing services is to start from tomorrow  The patient has been tolerating small amounts orally and has had no overt changes in mood, personality or behavior  No cardiorespiratory distress, fever, chills, URI or urinary symptoms  Daughter to direct patient's  to my office for any supplemental information necessary to ensure this patient can have 24/7 supervised care or obtain placement into a higher level of living  The patient continues on Aricept and Namenda for a history of dementia  She has been compliant with MiraLax and senna S for a history of constipation  She also continues on omeprazole for a history of GERD which remains controlled      Review of Systems   Unable to perform ROS: Dementia         Objective: There were no vitals filed for this visit  Physical Exam  Constitutional:       General: She is not in acute distress  Appearance: Normal appearance  She is not ill-appearing or diaphoretic  Comments: Elderly female sitting comfortably in chair in no obvious cardiorespiratory or painful distress   HENT:      Head: Normocephalic and atraumatic        Right Ear: External ear normal       Left Ear: External ear normal       Nose: Nose normal       Mouth/Throat: Mouth: Mucous membranes are moist    Eyes:      General:         Right eye: No discharge  Left eye: No discharge  Conjunctiva/sclera: Conjunctivae normal    Pulmonary:      Effort: Pulmonary effort is normal  No respiratory distress  Abdominal:      General: There is no distension  Palpations: Abdomen is soft  Tenderness: There is no abdominal tenderness  Musculoskeletal:         General: No swelling  Normal range of motion  Cervical back: Normal range of motion  Skin:     General: Skin is dry  Neurological:      Mental Status: She is alert  Mental status is at baseline  She is disoriented  Gait: Gait abnormal              Transitional Care Management Review:  Maya Reese is a 77 y o  female here for TCM follow up  During the TCM phone call patient stated:    TCM Call (since 6/8/2021)     Date and time call was made  7/7/2021  1:30 PM    Patient was hospitialized at  Other (comment)        Comment  Discharge from [address] [place] [address]     Date of Admission  06/21/21    Date of discharge  07/07/21    Diagnosis  Ambulatory dysfunction     Disposition  Home    Were the patients medications reviewed and updated  No    Current Symptoms  None      TCM Call (since 6/8/2021)     Post hospital issues  None    Should patient be enrolled in anticoag monitoring?   No    Patients specialists  Nephrologist; Other (comment)    Other specialists names  Ortho     Did you obtain your prescribed medications  No    Do you need help managing your prescriptions or medications  Yes    Why type of assitance do you need  Daughter does this     Is transportation to your appointment needed  Yes    [address]  Family    Are you recieving any outpatient services  No    Are you recieving home care services  No    Are you using any community resources  No    Have you fallen in the last 12 months  Yes    How many times  4 times at the facility, 2 times at home Interperter language line needed  No    Counseling  Family          I spent 45 minutes with the patient during this visit      Chris Duvall MD

## 2021-07-09 ENCOUNTER — TELEPHONE (OUTPATIENT)
Dept: GERIATRICS | Age: 76
End: 2021-07-09

## 2021-07-09 NOTE — TELEPHONE ENCOUNTER
Called pt's daughter Lana Orlando and relayed Dr Gutierrez Gene message and recommendation  She has a nephrology appt 7/13 follow up  Also scheduled pt's 3 month virtual follow up

## 2021-07-09 NOTE — ASSESSMENT & PLAN NOTE
Recommend scheduled toileting every 2 hours once a week  Avoid fluid intake 2 hours prior to bedtime to avoid nocturnal incontinence  All other depends undergarments

## 2021-07-09 NOTE — ASSESSMENT & PLAN NOTE
Currently being managed by Psychology   Continue Wellbutrin, citalopram, trazodone, Celexa  No overt changes in mood, personality or behavior   Continue social support by family and friends

## 2021-07-09 NOTE — ASSESSMENT & PLAN NOTE
Recent hospitalization for progressively worsening ambulation and debility   Etiology multifactorial given polypharmacy versus severe dementia vs chronic comorbid conditions  CTH:small meningioma  Atrophy  Moderate microangiopathic change    Patient has had an overall progression of her Alzheimer's disease requiring 24/7 supervised care  Maintain Falls precautions  Patient did complete short-term rehabilitation however this was discontinued due to insurance coverage  VNA Services to come in tomorrow  Recommend continued ST/OT /PT  Encourage patient to remain active mentally, physically and socially

## 2021-07-09 NOTE — ASSESSMENT & PLAN NOTE
History of severe dementia  Continue Aricept, Namenda  Given recent hospitalization, short-term rehabilitation, progressively worsening debility and cognition, this patient meets criteria for placement into a higher level of care  In doing so she will have provision of meals, supervised medication administration and the ability to remain active mentally, physically and socially  Alternatively if the patient is to remain at home, she meets criteria for 24/7 supervised care given her dependency in all ADLs and IADLs      The patient also has difficulty ambulating and requires assistance as she is a high falls risk  Reorientation and redirection as needed   Manage chronic conditions  Maintain Falls precautions  Encourage patient to remain active mentally, physically and socially  Patient to participate in cognitively challenging exercises able

## 2021-07-09 NOTE — ASSESSMENT & PLAN NOTE
Lab Results   Component Value Date    HGBA1C 5 7 (H) 06/10/2021     Controlled   Continue linagliptin 5 mg daily  Avoid liver toxic agents  Medications to be renally dosed  Follow up with Nephrology for CKD 4   Will continue to monitor

## 2021-07-09 NOTE — ASSESSMENT & PLAN NOTE
Continue ferrous sulfate daily   No acute blood loss   Continue routine follow-up with Nephrology for CKD 4

## 2021-07-09 NOTE — TELEPHONE ENCOUNTER
----- Message from Micha Herman MD sent at 7/9/2021  6:11 AM EDT -----   Please let patient's daughter know that she should follow up with Nephrology post hospitalization     I am also putting in a repeat renal function test to be done in 1 month

## 2021-07-09 NOTE — ASSESSMENT & PLAN NOTE
No blood pressure reading available today during tele video visit  Continue losartan 25 mg daily   Continue aspirin 81 mg daily   Continue hydralazine 25mg tid  Continue amlodipine 10 mg daily  Recommended diet is a diabetic, heart healthy diet

## 2021-07-09 NOTE — ASSESSMENT & PLAN NOTE
Patient with persisting ambulatory dysfunction   VNA services to start tomorrow  Maintain Falls precautions   Continue plan for physical therapy for gait training, balance and strengthening

## 2021-07-13 PROBLEM — N18.32 TYPE 2 DIABETES MELLITUS WITH STAGE 3B CHRONIC KIDNEY DISEASE AND HYPERTENSION (HCC): Status: ACTIVE | Noted: 2020-07-31

## 2021-07-13 PROBLEM — I12.9 TYPE 2 DIABETES MELLITUS WITH STAGE 4 CHRONIC KIDNEY DISEASE AND HYPERTENSION (HCC): Status: RESOLVED | Noted: 2020-07-31 | Resolved: 2021-07-13

## 2021-07-13 PROBLEM — E87.8 LOW BICARBONATE LEVEL: Status: ACTIVE | Noted: 2021-07-13

## 2021-07-13 PROBLEM — E11.22 TYPE 2 DIABETES MELLITUS WITH STAGE 4 CHRONIC KIDNEY DISEASE AND HYPERTENSION (HCC): Status: RESOLVED | Noted: 2020-07-31 | Resolved: 2021-07-13

## 2021-07-13 PROBLEM — N18.4 TYPE 2 DIABETES MELLITUS WITH STAGE 4 CHRONIC KIDNEY DISEASE AND HYPERTENSION (HCC): Status: RESOLVED | Noted: 2020-07-31 | Resolved: 2021-07-13

## 2021-07-16 ENCOUNTER — TELEPHONE (OUTPATIENT)
Dept: GERIATRICS | Age: 76
End: 2021-07-16

## 2021-07-16 NOTE — TELEPHONE ENCOUNTER
[address] seeing patient; Senior care referred patient  [facility] requesting last office visit note for Continuation of Care  This MR routed last office note via fax

## 2021-07-16 NOTE — TELEPHONE ENCOUNTER
Eloy Matthews ([phone]) called and will fax to from [facility]  To ask for possible order for leg wraps  Patient has edema in both lower extremities  Patient vitals are normal      BP:  122/64    HR:  78    O2: 96 on room air    Res: 20    Patient does have a appt on 7/21 to start if an order is given for patients legs

## 2021-07-16 NOTE — TELEPHONE ENCOUNTER
----- Message from Elkin Veliz MD sent at 7/16/2021  3:24 PM EDT -----  Please call  [facility] and give the approval for leg wraps

## 2021-07-16 NOTE — TELEPHONE ENCOUNTER
Radha Patton and she stated that the message wasn't for leg wraps and she doesn't need that  Pt legs been swelling and she just wanted to give us a call to update how she is doing  Jp Padmini relayed that she is in a wheelchair and her legs are constantly hanging and she advises the pt she elevate her legs and to watch her salt intake  She stated that if anything worsens she will contact us on Monday and maybe consider the leg wraps but the message was just an update for Dr Gee Sheehan

## 2021-07-19 DIAGNOSIS — G30.1 LATE ONSET ALZHEIMER'S DISEASE WITHOUT BEHAVIORAL DISTURBANCE (HCC): ICD-10-CM

## 2021-07-19 DIAGNOSIS — F02.80 LATE ONSET ALZHEIMER'S DISEASE WITHOUT BEHAVIORAL DISTURBANCE (HCC): ICD-10-CM

## 2021-07-20 ENCOUNTER — TELEPHONE (OUTPATIENT)
Dept: OBGYN CLINIC | Facility: HOSPITAL | Age: 76
End: 2021-07-20

## 2021-07-20 RX ORDER — DONEPEZIL HYDROCHLORIDE 10 MG/1
TABLET, FILM COATED ORAL
Qty: 14 TABLET | Refills: 0 | OUTPATIENT
Start: 2021-07-20

## 2021-07-20 RX ORDER — RISPERIDONE 0.25 MG/1
TABLET, FILM COATED ORAL
Qty: 28 TABLET | Refills: 0 | OUTPATIENT
Start: 2021-07-20

## 2021-07-20 RX ORDER — RISPERIDONE 0.25 MG/1
0.25 TABLET, FILM COATED ORAL 2 TIMES DAILY
Qty: 180 TABLET | Refills: 0 | Status: SHIPPED | OUTPATIENT
Start: 2021-07-20 | End: 2021-08-30 | Stop reason: SDUPTHER

## 2021-07-20 RX ORDER — DONEPEZIL HYDROCHLORIDE 10 MG/1
10 TABLET, FILM COATED ORAL
Qty: 180 TABLET | Refills: 0 | Status: SHIPPED | OUTPATIENT
Start: 2021-07-20 | End: 2021-08-30 | Stop reason: SDUPTHER

## 2021-07-20 NOTE — TELEPHONE ENCOUNTER
Dr Yuly jaimes    Patient daughter couldn't get the patient in the car so she had to miss her appt today  Asked if she can be seen virtually, called the [place] office and spk with Willie Aguilar, she checked with Dr Yuly jaimes who said she should take Tylenol for pain and reschedule the appt, there's nothing he can do for her virtually  Appt rescheduled

## 2021-07-23 ENCOUNTER — TELEPHONE (OUTPATIENT)
Dept: GERIATRICS | Age: 76
End: 2021-07-23

## 2021-07-23 NOTE — TELEPHONE ENCOUNTER
[address] from [facility] called to make you aware that the patient fell today NO injuries other than flairing up her arthritis

## 2021-07-29 ENCOUNTER — TELEPHONE (OUTPATIENT)
Dept: GERIATRICS | Age: 76
End: 2021-07-29

## 2021-07-29 NOTE — TELEPHONE ENCOUNTER
Kassidy Hutchison from [facility] [place] ([phone]) called that she would like to get from the 7/08 appt doctors notes   She did not receive pages 5 thru 9 faxed to:    Fax:  [phone]

## 2021-08-10 ENCOUNTER — TELEPHONE (OUTPATIENT)
Dept: GERIATRICS | Age: 76
End: 2021-08-10

## 2021-08-10 NOTE — TELEPHONE ENCOUNTER
Dalia Ron from [facility] called and recommended that they follow up with patent for 3 more weeks, once a week  Patient arthritis has settled down since PT started  Also recommended patient is discharged from their care  They recommended home visit physical therapy continue working with the patient

## 2021-08-11 ENCOUNTER — TELEPHONE (OUTPATIENT)
Dept: GERIATRICS | Age: 76
End: 2021-08-11

## 2021-08-11 NOTE — TELEPHONE ENCOUNTER
Jamaica Horowitz from [facility] [place] ([phone]) relayed patient has beeb soaking through diapers over night  Caller requests order for [facility] Urine Collection System for patient, if provider feels appropriate  Caller relayed Medicare covers this device if medically necessary  Please advise

## 2021-08-12 NOTE — TELEPHONE ENCOUNTER
Contacted Alicia from [facility]; relayed provider approval  Trip Gabriel will call back with specific information on product to be ordered as well as DME company  When this office receives this information, will create order and will fax

## 2021-08-12 NOTE — TELEPHONE ENCOUNTER
Sure, we can give them the verbal order   She can let us know the specifics of where the order should be faxed

## 2021-08-25 ENCOUNTER — TELEPHONE (OUTPATIENT)
Dept: GERIATRICS | Age: 76
End: 2021-08-25

## 2021-08-25 NOTE — TELEPHONE ENCOUNTER
Visiting Nurse called in to report swelling of right arm/ hand  Also reported to daughter Susi Freeman  Called [facility] to offer appointment this afternoon or 8/31 at 4 pm  Neither can be done with her schedule and Ms Willie Coyne being 'immobile'  Schedule is very tight due to vacations, Please advise next steps

## 2021-08-30 ENCOUNTER — TELEPHONE (OUTPATIENT)
Dept: GERIATRICS | Age: 76
End: 2021-08-30

## 2021-08-30 ENCOUNTER — TELEMEDICINE (OUTPATIENT)
Dept: GERIATRICS | Age: 76
End: 2021-08-30
Payer: MEDICARE

## 2021-08-30 ENCOUNTER — OFFICE VISIT (OUTPATIENT)
Dept: OBGYN CLINIC | Facility: CLINIC | Age: 76
End: 2021-08-30
Payer: MEDICARE

## 2021-08-30 VITALS — WEIGHT: 173 LBS | BODY MASS INDEX: 28.82 KG/M2 | HEIGHT: 65 IN

## 2021-08-30 DIAGNOSIS — G30.1 LATE ONSET ALZHEIMER'S DISEASE WITHOUT BEHAVIORAL DISTURBANCE (HCC): ICD-10-CM

## 2021-08-30 DIAGNOSIS — M17.0 BILATERAL PRIMARY OSTEOARTHRITIS OF KNEE: Primary | ICD-10-CM

## 2021-08-30 DIAGNOSIS — E55.9 VITAMIN D DEFICIENCY: ICD-10-CM

## 2021-08-30 DIAGNOSIS — R42 VERTIGO: ICD-10-CM

## 2021-08-30 DIAGNOSIS — M79.89 RIGHT LEG SWELLING: Primary | ICD-10-CM

## 2021-08-30 DIAGNOSIS — N18.4 TYPE 2 DIABETES MELLITUS WITH STAGE 4 CHRONIC KIDNEY DISEASE AND HYPERTENSION (HCC): ICD-10-CM

## 2021-08-30 DIAGNOSIS — I10 ESSENTIAL HYPERTENSION: ICD-10-CM

## 2021-08-30 DIAGNOSIS — F33.1 MODERATE EPISODE OF RECURRENT MAJOR DEPRESSIVE DISORDER (HCC): ICD-10-CM

## 2021-08-30 DIAGNOSIS — E78.49 OTHER HYPERLIPIDEMIA: ICD-10-CM

## 2021-08-30 DIAGNOSIS — R15.9 INCONTINENCE OF FECES, UNSPECIFIED FECAL INCONTINENCE TYPE: Primary | ICD-10-CM

## 2021-08-30 DIAGNOSIS — I12.9 TYPE 2 DIABETES MELLITUS WITH STAGE 4 CHRONIC KIDNEY DISEASE AND HYPERTENSION (HCC): ICD-10-CM

## 2021-08-30 DIAGNOSIS — F02.80 LATE ONSET ALZHEIMER'S DISEASE WITHOUT BEHAVIORAL DISTURBANCE (HCC): ICD-10-CM

## 2021-08-30 DIAGNOSIS — E11.22 TYPE 2 DIABETES MELLITUS WITH STAGE 4 CHRONIC KIDNEY DISEASE AND HYPERTENSION (HCC): ICD-10-CM

## 2021-08-30 DIAGNOSIS — R26.2 AMBULATORY DYSFUNCTION: ICD-10-CM

## 2021-08-30 DIAGNOSIS — M79.89 ARM SWELLING: ICD-10-CM

## 2021-08-30 DIAGNOSIS — N18.4 CKD (CHRONIC KIDNEY DISEASE) STAGE 4, GFR 15-29 ML/MIN (HCC): ICD-10-CM

## 2021-08-30 PROCEDURE — 99213 OFFICE O/P EST LOW 20 MIN: CPT | Performed by: STUDENT IN AN ORGANIZED HEALTH CARE EDUCATION/TRAINING PROGRAM

## 2021-08-30 PROCEDURE — 20610 DRAIN/INJ JOINT/BURSA W/O US: CPT | Performed by: PHYSICAL MEDICINE & REHABILITATION

## 2021-08-30 PROCEDURE — 99213 OFFICE O/P EST LOW 20 MIN: CPT | Performed by: PHYSICAL MEDICINE & REHABILITATION

## 2021-08-30 RX ORDER — HYDRALAZINE HYDROCHLORIDE 25 MG/1
25 TABLET, FILM COATED ORAL 3 TIMES DAILY
Qty: 42 TABLET | Refills: 0 | Status: SHIPPED | OUTPATIENT
Start: 2021-08-30 | End: 2021-08-30

## 2021-08-30 RX ORDER — TRAZODONE HYDROCHLORIDE 50 MG/1
50 TABLET ORAL
Qty: 90 TABLET | Refills: 3 | Status: SHIPPED | OUTPATIENT
Start: 2021-08-30 | End: 2021-09-21 | Stop reason: SDUPTHER

## 2021-08-30 RX ORDER — ATORVASTATIN CALCIUM 40 MG/1
40 TABLET, FILM COATED ORAL DAILY
Qty: 14 TABLET | Refills: 0 | Status: SHIPPED | OUTPATIENT
Start: 2021-08-30 | End: 2021-10-11 | Stop reason: SDUPTHER

## 2021-08-30 RX ORDER — DONEPEZIL HYDROCHLORIDE 10 MG/1
10 TABLET, FILM COATED ORAL
Qty: 180 TABLET | Refills: 0 | Status: SHIPPED | OUTPATIENT
Start: 2021-08-30 | End: 2021-10-11 | Stop reason: SDUPTHER

## 2021-08-30 RX ORDER — LINAGLIPTIN 5 MG/1
5 TABLET, FILM COATED ORAL DAILY
Qty: 90 TABLET | Refills: 3 | Status: SHIPPED | OUTPATIENT
Start: 2021-08-30

## 2021-08-30 RX ORDER — CALCIFEDIOL 30 UG/1
30 CAPSULE, EXTENDED RELEASE ORAL DAILY
Qty: 14 CAPSULE | Refills: 0 | Status: SHIPPED | OUTPATIENT
Start: 2021-08-30

## 2021-08-30 RX ORDER — TRIAMCINOLONE ACETONIDE 40 MG/ML
80 INJECTION, SUSPENSION INTRA-ARTICULAR; INTRAMUSCULAR
Status: COMPLETED | OUTPATIENT
Start: 2021-08-30 | End: 2021-08-30

## 2021-08-30 RX ORDER — HYDRALAZINE HYDROCHLORIDE 25 MG/1
TABLET, FILM COATED ORAL
Qty: 270 TABLET | Refills: 1 | Status: SHIPPED | OUTPATIENT
Start: 2021-08-30 | End: 2022-05-08

## 2021-08-30 RX ORDER — LIDOCAINE HYDROCHLORIDE 10 MG/ML
3 INJECTION, SOLUTION INFILTRATION; PERINEURAL
Status: COMPLETED | OUTPATIENT
Start: 2021-08-30 | End: 2021-08-30

## 2021-08-30 RX ORDER — POLYETHYLENE GLYCOL 3350 17 G/17G
17 POWDER, FOR SOLUTION ORAL DAILY PRN
Qty: 1 EACH | Refills: 3
Start: 2021-08-30

## 2021-08-30 RX ORDER — MECLIZINE HYDROCHLORIDE 25 MG/1
25 TABLET ORAL 2 TIMES DAILY
Qty: 180 TABLET | Refills: 3 | Status: SHIPPED | OUTPATIENT
Start: 2021-08-30 | End: 2021-09-21 | Stop reason: SDUPTHER

## 2021-08-30 RX ORDER — LOSARTAN POTASSIUM 25 MG/1
25 TABLET ORAL DAILY
Qty: 90 TABLET | Refills: 3 | Status: SHIPPED | OUTPATIENT
Start: 2021-08-30

## 2021-08-30 RX ORDER — AMOXICILLIN 250 MG
1 CAPSULE ORAL
Qty: 90 TABLET | Refills: 3
Start: 2021-08-30

## 2021-08-30 RX ORDER — MEMANTINE HYDROCHLORIDE 10 MG/1
10 TABLET ORAL 2 TIMES DAILY
Qty: 180 TABLET | Refills: 3 | Status: SHIPPED | OUTPATIENT
Start: 2021-08-30 | End: 2021-09-21 | Stop reason: SDUPTHER

## 2021-08-30 RX ORDER — BUPROPION HYDROCHLORIDE 150 MG/1
150 TABLET, EXTENDED RELEASE ORAL 2 TIMES DAILY
Qty: 28 TABLET | Refills: 0 | Status: SHIPPED | OUTPATIENT
Start: 2021-08-30 | End: 2021-09-05 | Stop reason: SDUPTHER

## 2021-08-30 RX ORDER — CITALOPRAM 40 MG/1
40 TABLET ORAL DAILY
Qty: 90 TABLET | Refills: 2 | Status: SHIPPED | OUTPATIENT
Start: 2021-08-30 | End: 2021-09-21 | Stop reason: SDUPTHER

## 2021-08-30 RX ORDER — RISPERIDONE 0.25 MG/1
0.25 TABLET, FILM COATED ORAL 2 TIMES DAILY
Qty: 180 TABLET | Refills: 3 | Status: SHIPPED | OUTPATIENT
Start: 2021-08-30 | End: 2021-10-11 | Stop reason: SDUPTHER

## 2021-08-30 RX ADMIN — LIDOCAINE HYDROCHLORIDE 3 ML: 10 INJECTION, SOLUTION INFILTRATION; PERINEURAL at 15:18

## 2021-08-30 RX ADMIN — TRIAMCINOLONE ACETONIDE 80 MG: 40 INJECTION, SUSPENSION INTRA-ARTICULAR; INTRAMUSCULAR at 15:18

## 2021-08-30 NOTE — TELEPHONE ENCOUNTER
Called pt's daughter Gabriel Ibarra to gather more information on the call earlier with [facility] needed some refills, which was placed through [facility]  Giovanna also wanted to follow up regarding the script that was sent to [facility] on July 8th  I stated that I reprinted the script and will resend it to [facility] and follow up with them  Lastly, I scheduled the pt's Dupplex scan for 9/8 at 11am and Giovanna stated that she would make time to take her

## 2021-08-30 NOTE — PROGRESS NOTES
R arm/ hand swelling for 1-week and Leg swelling 3-weeks  Pt was checked in via  Daughter, daughter  could not review pt medication

## 2021-08-30 NOTE — ASSESSMENT & PLAN NOTE
Swelling of entire right upper extremity   No erythema, no warmth, mild pain, ROM within normal limits  Etiology likely secondary to patient lying on her right side however will order venous Doppler to rule out a DVT

## 2021-08-30 NOTE — ASSESSMENT & PLAN NOTE
Patient with right lower extremity edema progressively worsening   No erythema, normal ROM  Overall patient has however been progressively more debilitated   Patient has been lying on her right side predominantly however will order lower extremity venous Doppler to rule order DVT

## 2021-08-30 NOTE — TELEPHONE ENCOUNTER
Left message for daughter Pattie Alfredo no appt after 2pm at [facility] or [place]  There is still appt today for 12:30pm at [facility] if daughter can get help  No other appointments till 9/21  Dr Caryle Kenning recommends taking patient to the ER for swelling in both upper and lower right side   Left detailed message for the daughter

## 2021-08-30 NOTE — PROGRESS NOTES
1  Late onset Alzheimer's disease without behavioral disturbance ([facility] [address] )     2  Bilateral primary osteoarthritis of knee       Orders Placed This Encounter   Procedures    Large joint arthrocentesis        Impression: This is a patient with Alzheimer's dementia who presents with chronic bilateral knee pain likely secondary to osteoarthritis as below  Patient finished a viscosupplementation series this past February 2021   She has also had steroid injection into both of her knees in the past     Most of the history is obtained from her daughter due to the patient's dementia  We discussed different treatment options and decided to proceed with bilateral knee intra-articular injections with steroid  If patient continues to have pain, can consider having her go for pain management consult to consider genicular nerve blocks  She has been using a medial  for the left knee and a hinged knee brace for the right knee  She can also continue with Voltaren gel  Return to clinic if needed  I also advised the patient that she should going obtain her Doppler studies as soon as she can  Imaging Studies (I personally reviewed images in PACS and report):  Please see my initial note  Return if symptoms worsen or fail to improve  Patient is in agreement with the above plan  HPI:  Ric Mosqueda is a 77 y o  female  who presents in follow up  Here for   Chief Complaint   Patient presents with    Right Knee - Pain, Follow-up    Left Knee - Pain, Follow-up       Date of injury:  Chronic  Trajectory of symptoms:  Continues to have pain  Review of Systems   Constitutional: Positive for activity change  Negative for fever  HENT: Negative for sore throat  Eyes: Negative for visual disturbance  Respiratory: Negative for shortness of breath  Cardiovascular: Negative for chest pain  Gastrointestinal: Negative for abdominal pain  Endocrine: Negative for polydipsia     Genitourinary: Negative for difficulty urinating  Musculoskeletal: Positive for arthralgias and gait problem  Skin: Negative for rash  Allergic/Immunologic: Negative for immunocompromised state  Neurological: Negative for numbness  Hematological: Does not bruise/bleed easily  Psychiatric/Behavioral: Negative for confusion  Following history reviewed and updated:  Past Medical History:   Diagnosis Date    Acute cystitis without hematuria 8/13/2020    Allergic rhinitis 5/18/2021    Anxiety     Cholesterol depletion     Confusion     Confusion 5/17/2021    Dementia (HCC)     Depression     [facility]     Early onset Alzheimer's dementia ([facility]     Encounter for screening colonoscopy 9/22/2020    Glaucoma     Hypertension     Hypertension     Memory loss      Past Surgical History:   Procedure Laterality Date    CYSTOSCOPY  06/10/2021    HYSTERECTOMY       Social History   Social History     Substance and Sexual Activity   Alcohol Use Not Currently     Social History     Substance and Sexual Activity   Drug Use Never     Social History     Tobacco Use   Smoking Status Former Smoker    Types: Cigarettes    Quit date: 7/30/2018    Years since quitting: 3 0   Smokeless Tobacco Never Used     Family History   Problem Relation Age of Onset    Diabetes Mother     Hypertension Mother      Allergies   Allergen Reactions    Codeine         Constitutional:  Ht 5' 5" (1 651 m)   Wt 78 5 kg (173 lb)   BMI 28 79 kg/m²    General: NAD  Eyes: Clear sclerae  ENT: No inflammation, lesion, or mass of lips  No tracheal deviation  Musculoskeletal: As mentioned below  Integumentary: No visible rashes or skin lesions  Pulmonary/Chest: Effort normal  No respiratory distress  Neuro: CN's grossly intact, CLANCY  Psych: Normal affect and judgement  Vascular: WWP  Right Knee Exam     Tenderness   The patient is experiencing tenderness in the medial joint line      Range of Motion   Extension: normal Flexion: abnormal     Other   Erythema: absent  Scars: absent  Sensation: normal  Pulse: present  Swelling: mild  Effusion: effusion present      Left Knee Exam     Tenderness   The patient is experiencing tenderness in the medial joint line  Range of Motion   Extension: normal   Flexion: abnormal     Other   Erythema: absent  Scars: absent  Sensation: normal  Pulse: present  Swelling: mild  Effusion: effusion present             Large joint arthrocentesis: bilateral knee  Universal Protocol:  Consent given by: patient  Timeout called at: 8/30/2021 3:16 PM   Site marked: the operative site was marked  Supporting Documentation  Indications: pain   Procedure Details  Location: knee - bilateral knee  Needle gauge: 21G 2''  Ultrasound guidance: no  Approach: Inferolateral to patella  Medications (Right): 80 mg triamcinolone acetonide 40 mg/mL; 3 mL lidocaine 1 %Medications (Left): 80 mg triamcinolone acetonide 40 mg/mL; 3 mL lidocaine 1 %   Patient tolerance: patient tolerated the procedure well with no immediate complications  Dressing:  Sterile dressing applied    A 2 inch needle was used and I was able to hub the entire needle  This makes it fairly certain that I am within the intercondylar notch/joint space  There was little to no resistance encountered during the injection  Prior to the procedure, the patient was informed of the following risks in layman terms:    - Risk of bleeding since a needle is involved  - Risk of infection (1/10,000 chance as per recent studies)  Signs/symptoms were discussed and they would prompt an urgent evaluation at an emergency department   - Risk of pigmentation or skin dimpling in the skin (2-3% chance as per recent studies) from the steroid  - Risk of increased pain from steroid flare (1% chance as per recent studies) that typically lasts 24-48 hours  - Risk of increased blood sugars from the steroid medication that can last for a few weeks    If the patient is a diabetic or pre-diabetic, they were encouraged to closely monitor their blood sugars and discuss with PCP if elevated more than usual or if having symptoms  After going over these risks, we decided that the benefits outweigh the risks and proceeded with the procedure  This was done bilaterally

## 2021-08-30 NOTE — PROGRESS NOTES
Virtual Regular Visit    Verification of patient location:    Patient is located in the following state in which I hold an active license [place]      Assessment/Plan:    Problem List Items Addressed This Visit        Other    Ambulatory dysfunction     Patient continues to be in mobile   Significant decline mentally, physically and cognitively   Maintain [place] precautions  Physical therapy as able         Right leg swelling - Primary      Patient with right lower extremity edema progressively worsening   No erythema, normal ROM  Overall patient has however been progressively more debilitated   Patient has been lying on her right side predominantly however will order lower extremity venous Doppler to rule order DVT         Relevant Orders    VAS lower limb venous duplex study, unilateral/limited    Arm swelling      Swelling of entire right upper extremity   No erythema, no warmth, mild pain, ROM within normal limits  Etiology likely secondary to patient lying on her right side however will order venous Doppler to rule out a DVT         Relevant Orders    VAS upper limb venous duplex scan, unilateral/limited               Reason for visit is   Chief Complaint   Patient presents with    Virtual Regular Visit    Virtual Regular Visit        Encounter provider Kavita Moran MD    Provider located at [address] [place] [address] [address] [place] [address]  [phone]      Recent Visits  Date Type Provider Dept   08/25/21 Telephone [address] [place] recent visits within past 7 days and meeting all other requirements  Today's Visits  Date Type Provider Dept   08/30/21 [address], MD [facility] today's visits and meeting all other requirements  Future Appointments  No visits were found meeting these conditions    Showing future appointments within next 150 days and meeting all other requirements       The patient was identified by name and date of birth  Finesse Huang was informed that this is a telemedicine visit and that the visit is being conducted through [employer] (mytaxi) and patient was informed that this is a secure, HIPAA-compliant platform  She agrees to proceed     My office door was closed  No one else was in the room  She acknowledged consent and understanding of privacy and security of the video platform  The patient has agreed to participate and understands they can discontinue the visit at any time  Patient is aware this is a billable service  Subjective  Finesse Huang is a 77 y o  female who presents for swelling of her right upper and lower extremities   HPI     This is a 56-year-old female with type 2 diabetes, HTN, HLD, constipation, severe dementia and gait instability who presents with right upper and lower extremity swelling  Given patient's severity of dementia, review of systems and history were obtained by the patient's daughter who is her primary caretaker  Daughter explains overall the patient continues to have a progressive decline mentally, physically and cognitiively  The patient was seen virtually today while she was in a hospital bed at home  Daughter explains that over the past 3 weeks, she has had progressively worsening right upper and lower extremity edema  She further relates that the patient typically lies on her right side with edema possibly being dependent  No fever, chills, erythema or new pain in her extremities  The patient has had increasing debility with decreased ambulation  Daughter also stating that the patient's osteoarthritis of her knees have worsened likely contributing to her ambulatory dysfunction for which she is scheduled to see Orthopedics today  No cardiorespiratory distress, chest pain, lightheadedness or syncope  No history of fall or injury to the right side    The patient is mostly bed ridden and can be at risk of a DVT  Daughter is attempting to obtain home health aides to assist in her mother's care who is fully dependent in all ADLs and IADLs  Discussed with daughter, it is imperative to rule out a DVT and will place  upper and lower extremity Dopplers as stat orders          Past Medical History:   Diagnosis Date    Acute cystitis without hematuria 8/13/2020    Allergic rhinitis 5/18/2021    Anxiety     Cholesterol depletion     Confusion     Confusion 5/17/2021    D[facility] [address] [facility]     Depression     Diabetes [facility] [address] [facility]     [facility]     Encounter for screening colonoscopy 9/22/2020    Glaucoma     Hypertension     Hypertension     Memory loss        Past Surgical History:   Procedure Laterality Date    CYSTOSCOPY  06/10/2021    HYSTERECTOMY         Current Outpatient Medications   Medication Sig Dispense Refill    amLODIPine (NORVASC) 10 mg tablet TAKE 1 TABLET BY MOUTH EVERY DAY 90 tablet 1    Ascorbic Acid (VITAMIN C) 100 MG tablet Take 100 mg by mouth daily      aspirin (ECOTRIN LOW STRENGTH) 81 mg EC tablet Take 81 mg by mouth daily      atorvastatin (LIPITOR) 40 mg tablet Take 1 tablet (40 mg total) by mouth daily 14 tablet 0    Azopt 1 % ophthalmic suspension       Brimonidine Tartrate-Timolol (COMBIGAN OP) Apply to eye      buPROPion (WELLBUTRIN SR) 150 mg 12 hr tablet Take 1 tablet (150 mg total) by mouth 2 (two) times a day 28 tablet 0    Calcifediol ER (Rayaldee) 30 MCG CPCR Take 30 mcg by mouth daily 14 capsule 0    cetirizine (ZyrTEC) 10 mg tablet Take 10 mg by mouth daily      citalopram (CeleXA) 40 mg tablet Take 1 tablet (40 mg total) by mouth daily 14 tablet 0    diclofenac sodium (VOLTAREN) 1 % Apply 2 g topically 3 (three) times a day as needed (Pain) 100 g 1    donepezil (ARICEPT) 10 mg tablet Take 1 tablet (10 mg total) by mouth daily at bedtime 180 tablet 0    dorzolamide (TRUSOPT) 2 % ophthalmic solution 1 drop 3 (three) times a day      ferrous sulfate 325 (65 Fe) mg tablet Take 325 mg by mouth daily with breakfast      fluticasone (FLONASE) 50 mcg/act nasal spray 1 spray into each nostril daily 1 g 2    hydrALAZINE (APRESOLINE) 25 mg tablet Take 1 tablet (25 mg total) by mouth 3 (three) times a day 42 tablet 0    Incontinence Supply Disposable (Depend Underwear Large/XL) MISC Use 6 (six) times a day 180 each 5    Latanoprostene Bunod (Vyzulta) 0 024 % SOLN Apply to eye      linaGLIPtin (Tradjenta) 5 MG TABS Take 5 mg by mouth daily 14 tablet 0    losartan (COZAAR) 25 mg tablet Take 1 tablet (25 mg total) by mouth daily 14 tablet 0    meclizine (ANTIVERT) 25 mg tablet Take 1 tablet (25 mg total) by mouth 2 (two) times a day 28 tablet 0    memantine (NAMENDA) 10 mg tablet Take 1 tablet (10 mg total) by mouth 2 (two) times a day 28 tablet 0    Multiple Vitamins-Minerals (CENTRUM SILVER PO) Take by mouth      omeprazole (PriLOSEC) 20 mg delayed release capsule Take 20 mg by mouth daily      polyethylene glycol (MIRALAX) 17 g packet Take 17 g by mouth daily as needed (constipation) 1 each 0    risperiDONE (RisperDAL) 0 25 mg tablet Take 1 tablet (0 25 mg total) by mouth 2 (two) times a day 180 tablet 0    senna-docusate sodium (SENOKOT S) 8 6-50 mg per tablet Take 1 tablet by mouth daily at bedtime 30 tablet 0    traZODone (DESYREL) 50 mg tablet Take 1 tablet (50 mg total) by mouth daily at bedtime 14 tablet 0     No current facility-administered medications for this visit  Allergies   Allergen Reactions    Codeine        Review of Systems   Unable to perform ROS: Dementia       Video Exam    There were no vitals filed for this visit  Physical Exam  Constitutional:       Appearance: Normal appearance  She is not ill-appearing or diaphoretic  HENT:      Head: Normocephalic and atraumatic  Nose: Nose normal  No rhinorrhea  Mouth/Throat:      Mouth: Mucous membranes are moist    Eyes:      General: No scleral icterus  Right eye: No discharge  Left eye: No discharge  Conjunctiva/sclera: Conjunctivae normal    Pulmonary:      Effort: Pulmonary effort is normal  No respiratory distress  Abdominal:      General: There is no distension  Musculoskeletal:         General: Swelling ( swelling right upper extremity) present  No tenderness  Normal range of motion  Cervical back: Normal range of motion  Right lower leg: Edema present  Skin:     Findings: No erythema  Neurological:      Mental Status: She is alert  Mental status is at baseline  She is disoriented  Gait: Gait abnormal    Psychiatric:      Comments:  Patient pleasantly confused at baseline          I spent 20 minutes directly with the patient during this visit    VIRTUAL VISIT DISCLAIMER      Zuleyma Johnston verbally agrees to participate in [employer]  Pt is aware that [employer] could be limited without vital signs or the ability to perform a full hands-on physical Judi Claudyen understands she or the provider may request at any time to terminate the video visit and request the patient to seek care or treatment in person

## 2021-08-30 NOTE — ASSESSMENT & PLAN NOTE
Patient continues to be in mobile   Significant decline mentally, physically and cognitively   Maintain [place] precautions  Physical therapy as able

## 2021-08-30 NOTE — TELEPHONE ENCOUNTER
Patients daughter ([phone]) called back to ask if all the medications were transferred over to [facility] in [place]       And also the script for incontinent supplies were sent to [employer]

## 2021-09-04 DIAGNOSIS — F33.1 MODERATE EPISODE OF RECURRENT MAJOR DEPRESSIVE DISORDER (HCC): ICD-10-CM

## 2021-09-05 RX ORDER — BUPROPION HYDROCHLORIDE 150 MG/1
TABLET, EXTENDED RELEASE ORAL
Qty: 28 TABLET | Refills: 0 | OUTPATIENT
Start: 2021-09-05

## 2021-09-05 RX ORDER — BUPROPION HYDROCHLORIDE 150 MG/1
150 TABLET, EXTENDED RELEASE ORAL 2 TIMES DAILY
Qty: 180 TABLET | Refills: 0 | Status: SHIPPED | OUTPATIENT
Start: 2021-09-05 | End: 2021-09-20 | Stop reason: SDUPTHER

## 2021-09-08 ENCOUNTER — HOSPITAL ENCOUNTER (OUTPATIENT)
Dept: NON INVASIVE DIAGNOSTICS | Facility: HOSPITAL | Age: 76
Discharge: HOME | End: 2021-09-08
Attending: STUDENT IN AN ORGANIZED HEALTH CARE EDUCATION/TRAINING PROGRAM
Payer: MEDICARE

## 2021-09-08 ENCOUNTER — TELEPHONE (OUTPATIENT)
Dept: GERIATRICS | Age: 76
End: 2021-09-08

## 2021-09-08 DIAGNOSIS — M79.89 RIGHT LEG SWELLING: ICD-10-CM

## 2021-09-08 DIAGNOSIS — M79.89 ARM SWELLING: ICD-10-CM

## 2021-09-08 PROCEDURE — 93971 EXTREMITY STUDY: CPT

## 2021-09-08 PROCEDURE — 93971 EXTREMITY STUDY: CPT | Performed by: SURGERY

## 2021-09-08 NOTE — TELEPHONE ENCOUNTER
Called daughter Bebe Cheng and relayed Dr Kristina Martinez message  Giovanna asked what could be the cause of it then? She stated that the swelling is constant and she is concerned

## 2021-09-08 NOTE — TELEPHONE ENCOUNTER
----- Message from Savannah Conroy MD sent at 9/8/2021  2:02 PM EDT -----  Pls call the pt's daughter and let her know there are no DVTs (blood clot) on the results of the dopplers of her arm/legs

## 2021-09-09 NOTE — TELEPHONE ENCOUNTER
Please let the dtr know that it is likely dependent edema as the pt is lying mostly on her right side  She should be turned onto different positions (on her back, then on her left side) every 2 hours   Thankfully it is not a DVT

## 2021-09-16 ENCOUNTER — TELEPHONE (OUTPATIENT)
Dept: GERIATRICS | Age: 76
End: 2021-09-16

## 2021-09-16 DIAGNOSIS — R82.90 BAD ODOR OF URINE: ICD-10-CM

## 2021-09-16 DIAGNOSIS — R82.998 DARK URINE: Primary | ICD-10-CM

## 2021-09-16 NOTE — TELEPHONE ENCOUNTER
Daughter reported to [facility] Nurse that the patient has had increased confusion along with dark urine and order  Patient does have a history of UTI  Nurse will be at the home today between 1-2pm can she have a order for urine check for UTI?

## 2021-09-16 NOTE — TELEPHONE ENCOUNTER
Spoke with [facility] Nurse  Advise to relay to daughter Dr Rodrigo Woodson instructions and urine order was approved and will fax to [facility] at [phone]

## 2021-09-16 NOTE — TELEPHONE ENCOUNTER
Orders signed    Pls let daughter know that if the pt develops any fever, chills, lethargy, abdo pain, nausea or vomiting, she needs to go to the ER as the pt is high risk for sepsis

## 2021-09-16 NOTE — TELEPHONE ENCOUNTER
[facility] with [facility] Nurse  Advise to relay to daughter Dr Pelon Humphries instructions and urine order was approved and will fax to [facility] at [phone]

## 2021-09-16 NOTE — TELEPHONE ENCOUNTER
[facility] called to say that they received the medication form back but wanted to inquire about medications that was not on the list  I did make them aware that I do not have them in [facility] as the patient's pharmacy so I was unable to provide information  The y will call patient and have her contact our office to update pharmacy info

## 2021-09-17 ENCOUNTER — TELEPHONE (OUTPATIENT)
Dept: GERIATRICS | Age: 76
End: 2021-09-17

## 2021-09-17 DIAGNOSIS — N30.00 ACUTE CYSTITIS WITHOUT HEMATURIA: Primary | ICD-10-CM

## 2021-09-17 RX ORDER — CEPHALEXIN 500 MG/1
500 CAPSULE ORAL EVERY 12 HOURS SCHEDULED
Qty: 14 CAPSULE | Refills: 0 | Status: SHIPPED | OUTPATIENT
Start: 2021-09-17 | End: 2021-09-24

## 2021-09-17 NOTE — TELEPHONE ENCOUNTER
Called patient's daughter Shakeel Nichole to inform her that patients urinalysis came back looking positive for UTI and Dr Jada Phelps sent a prescription to her pharmacy for Keflex  Patient's daughter was also informed that if patient experiences any fever, chills, lethargy, abdominal pain, nausea, or vomiting she should take patient to the ER      ----- Message from Alok Taylor MD sent at 9/17/2021 11:14 AM EDT -----   Please call the patient's daughter and let her know that the preliminary urinalysis looks positive for UTI  I have sent in a prescription  for Keflex  Patient should start this    Please let daughter know should the patient develop any fever, chills, lethargy, abdominal pain, nausea, vomiting to go to the ER

## 2021-09-20 DIAGNOSIS — R42 VERTIGO: ICD-10-CM

## 2021-09-20 DIAGNOSIS — G30.1 LATE ONSET ALZHEIMER'S DISEASE WITHOUT BEHAVIORAL DISTURBANCE (HCC): ICD-10-CM

## 2021-09-20 DIAGNOSIS — F33.1 MODERATE EPISODE OF RECURRENT MAJOR DEPRESSIVE DISORDER (HCC): ICD-10-CM

## 2021-09-20 DIAGNOSIS — F02.80 LATE ONSET ALZHEIMER'S DISEASE WITHOUT BEHAVIORAL DISTURBANCE (HCC): ICD-10-CM

## 2021-09-20 RX ORDER — BUPROPION HYDROCHLORIDE 150 MG/1
150 TABLET, EXTENDED RELEASE ORAL 2 TIMES DAILY
Qty: 180 TABLET | Refills: 2 | Status: SHIPPED | OUTPATIENT
Start: 2021-09-20 | End: 2021-09-21 | Stop reason: SDUPTHER

## 2021-09-21 DIAGNOSIS — F33.1 MODERATE EPISODE OF RECURRENT MAJOR DEPRESSIVE DISORDER (HCC): ICD-10-CM

## 2021-09-21 DIAGNOSIS — G30.1 LATE ONSET ALZHEIMER'S DISEASE WITHOUT BEHAVIORAL DISTURBANCE (HCC): ICD-10-CM

## 2021-09-21 DIAGNOSIS — R42 VERTIGO: ICD-10-CM

## 2021-09-21 DIAGNOSIS — F02.80 LATE ONSET ALZHEIMER'S DISEASE WITHOUT BEHAVIORAL DISTURBANCE (HCC): ICD-10-CM

## 2021-09-21 DIAGNOSIS — J30.9 ALLERGIC RHINITIS, UNSPECIFIED SEASONALITY, UNSPECIFIED TRIGGER: ICD-10-CM

## 2021-09-21 RX ORDER — CITALOPRAM 40 MG/1
40 TABLET ORAL DAILY
Qty: 90 TABLET | Refills: 2 | Status: SHIPPED | OUTPATIENT
Start: 2021-09-21

## 2021-09-21 RX ORDER — MEMANTINE HYDROCHLORIDE 10 MG/1
10 TABLET ORAL 2 TIMES DAILY
Qty: 180 TABLET | Refills: 3 | Status: SHIPPED | OUTPATIENT
Start: 2021-09-21

## 2021-09-21 RX ORDER — TRAZODONE HYDROCHLORIDE 50 MG/1
50 TABLET ORAL
Qty: 90 TABLET | Refills: 3 | Status: SHIPPED | OUTPATIENT
Start: 2021-09-21

## 2021-09-21 RX ORDER — BUPROPION HYDROCHLORIDE 150 MG/1
150 TABLET, EXTENDED RELEASE ORAL 2 TIMES DAILY
Qty: 180 TABLET | Refills: 2 | Status: SHIPPED | OUTPATIENT
Start: 2021-09-21 | End: 2021-09-27

## 2021-09-21 RX ORDER — FLUTICASONE PROPIONATE 50 MCG
1 SPRAY, SUSPENSION (ML) NASAL DAILY
Qty: 1 G | Refills: 2 | Status: SHIPPED | OUTPATIENT
Start: 2021-09-21 | End: 2022-01-24

## 2021-09-21 RX ORDER — MECLIZINE HYDROCHLORIDE 25 MG/1
25 TABLET ORAL 2 TIMES DAILY
Qty: 180 TABLET | Refills: 3 | Status: SHIPPED | OUTPATIENT
Start: 2021-09-21

## 2021-09-21 NOTE — TELEPHONE ENCOUNTER
[place] requested multiple prescriptions as patient's daughter requested such  This [place] called daughter to confirm their request for mail order prescriptions; daughter confirmed  Provider to sign

## 2021-09-29 ENCOUNTER — TELEPHONE (OUTPATIENT)
Dept: GERIATRICS | Age: 76
End: 2021-09-29

## 2021-09-29 NOTE — TELEPHONE ENCOUNTER
Can you place the [facility] referral form in my mailbox with the attached demographic sheet and info   I'll complete it tmrw  Thanks

## 2021-09-29 NOTE — TELEPHONE ENCOUNTER
Eddie Coppola from [facility] [place] has discharged the patient  Patient is able to walk approximately 15 feet with assistance and cueing  Suggesting a referral to [employer]  [employer] [phone]    Please include patient demographics with referral

## 2021-10-05 ENCOUNTER — TELEMEDICINE (OUTPATIENT)
Dept: NEPHROLOGY | Facility: CLINIC | Age: 76
End: 2021-10-05
Payer: MEDICARE

## 2021-10-05 DIAGNOSIS — E87.8 LOW BICARBONATE LEVEL: ICD-10-CM

## 2021-10-05 DIAGNOSIS — I12.9 TYPE 2 DIABETES MELLITUS WITH STAGE 3B CHRONIC KIDNEY DISEASE AND HYPERTENSION (HCC): Primary | ICD-10-CM

## 2021-10-05 DIAGNOSIS — N18.32 TYPE 2 DIABETES MELLITUS WITH STAGE 3B CHRONIC KIDNEY DISEASE AND HYPERTENSION (HCC): Primary | ICD-10-CM

## 2021-10-05 DIAGNOSIS — E55.9 VITAMIN D DEFICIENCY: ICD-10-CM

## 2021-10-05 DIAGNOSIS — E11.22 TYPE 2 DIABETES MELLITUS WITH STAGE 3B CHRONIC KIDNEY DISEASE AND HYPERTENSION (HCC): Primary | ICD-10-CM

## 2021-10-05 PROCEDURE — 99214 OFFICE O/P EST MOD 30 MIN: CPT | Performed by: INTERNAL MEDICINE

## 2021-10-11 ENCOUNTER — TELEPHONE (OUTPATIENT)
Dept: LAB | Facility: HOSPITAL | Age: 76
End: 2021-10-11

## 2021-10-11 ENCOUNTER — TELEMEDICINE (OUTPATIENT)
Dept: GERIATRICS | Age: 76
End: 2021-10-11
Payer: MEDICARE

## 2021-10-11 DIAGNOSIS — F02.80 LATE ONSET ALZHEIMER'S DISEASE WITHOUT BEHAVIORAL DISTURBANCE (HCC): Primary | ICD-10-CM

## 2021-10-11 DIAGNOSIS — R26.81 GAIT INSTABILITY: ICD-10-CM

## 2021-10-11 DIAGNOSIS — R32 URINARY INCONTINENCE, UNSPECIFIED TYPE: ICD-10-CM

## 2021-10-11 DIAGNOSIS — I10 ESSENTIAL HYPERTENSION: ICD-10-CM

## 2021-10-11 DIAGNOSIS — E78.49 OTHER HYPERLIPIDEMIA: ICD-10-CM

## 2021-10-11 DIAGNOSIS — F33.1 MODERATE EPISODE OF RECURRENT MAJOR DEPRESSIVE DISORDER (HCC): ICD-10-CM

## 2021-10-11 DIAGNOSIS — I12.9 TYPE 2 DIABETES MELLITUS WITH STAGE 3B CHRONIC KIDNEY DISEASE AND HYPERTENSION (HCC): ICD-10-CM

## 2021-10-11 DIAGNOSIS — M79.89 LEG SWELLING: ICD-10-CM

## 2021-10-11 DIAGNOSIS — E11.22 TYPE 2 DIABETES MELLITUS WITH STAGE 3B CHRONIC KIDNEY DISEASE AND HYPERTENSION (HCC): ICD-10-CM

## 2021-10-11 DIAGNOSIS — G30.1 LATE ONSET ALZHEIMER'S DISEASE WITHOUT BEHAVIORAL DISTURBANCE (HCC): Primary | ICD-10-CM

## 2021-10-11 DIAGNOSIS — N18.32 TYPE 2 DIABETES MELLITUS WITH STAGE 3B CHRONIC KIDNEY DISEASE AND HYPERTENSION (HCC): ICD-10-CM

## 2021-10-11 PROCEDURE — 99215 OFFICE O/P EST HI 40 MIN: CPT | Performed by: STUDENT IN AN ORGANIZED HEALTH CARE EDUCATION/TRAINING PROGRAM

## 2021-10-11 RX ORDER — BUPROPION HYDROCHLORIDE 150 MG/1
150 TABLET, EXTENDED RELEASE ORAL 2 TIMES DAILY
Qty: 180 TABLET | Refills: 2 | Status: SHIPPED | OUTPATIENT
Start: 2021-10-11

## 2021-10-11 RX ORDER — FUROSEMIDE 20 MG/1
10 TABLET ORAL DAILY
Qty: 30 TABLET | Refills: 0 | Status: SHIPPED | OUTPATIENT
Start: 2021-10-11 | End: 2021-12-02

## 2021-10-11 RX ORDER — ATORVASTATIN CALCIUM 40 MG/1
40 TABLET, FILM COATED ORAL DAILY
Qty: 90 TABLET | Refills: 2 | Status: SHIPPED | OUTPATIENT
Start: 2021-10-11

## 2021-10-11 RX ORDER — DONEPEZIL HYDROCHLORIDE 10 MG/1
10 TABLET, FILM COATED ORAL
Qty: 180 TABLET | Refills: 0 | Status: SHIPPED | OUTPATIENT
Start: 2021-10-11 | End: 2022-05-31

## 2021-10-11 RX ORDER — RISPERIDONE 0.25 MG/1
0.25 TABLET, FILM COATED ORAL 2 TIMES DAILY
Qty: 180 TABLET | Refills: 3 | Status: SHIPPED | OUTPATIENT
Start: 2021-10-11

## 2021-10-12 ENCOUNTER — TELEPHONE (OUTPATIENT)
Dept: GERIATRICS | Age: 76
End: 2021-10-12

## 2021-10-28 ENCOUNTER — APPOINTMENT (OUTPATIENT)
Dept: LAB | Facility: HOSPITAL | Age: 76
End: 2021-10-28
Attending: STUDENT IN AN ORGANIZED HEALTH CARE EDUCATION/TRAINING PROGRAM
Payer: MEDICARE

## 2021-10-28 DIAGNOSIS — R82.998 DARK URINE: ICD-10-CM

## 2021-10-28 DIAGNOSIS — E87.8 LOW BICARBONATE LEVEL: ICD-10-CM

## 2021-10-28 DIAGNOSIS — E11.22 TYPE 2 DIABETES MELLITUS WITH STAGE 3B CHRONIC KIDNEY DISEASE AND HYPERTENSION (HCC): ICD-10-CM

## 2021-10-28 DIAGNOSIS — E55.9 VITAMIN D DEFICIENCY: ICD-10-CM

## 2021-10-28 DIAGNOSIS — R82.90 BAD ODOR OF URINE: ICD-10-CM

## 2021-10-28 DIAGNOSIS — N18.32 TYPE 2 DIABETES MELLITUS WITH STAGE 3B CHRONIC KIDNEY DISEASE AND HYPERTENSION (HCC): ICD-10-CM

## 2021-10-28 DIAGNOSIS — E78.49 OTHER HYPERLIPIDEMIA: ICD-10-CM

## 2021-10-28 DIAGNOSIS — I12.9 TYPE 2 DIABETES MELLITUS WITH STAGE 3B CHRONIC KIDNEY DISEASE AND HYPERTENSION (HCC): ICD-10-CM

## 2021-10-28 DIAGNOSIS — F33.1 MODERATE EPISODE OF RECURRENT MAJOR DEPRESSIVE DISORDER (HCC): ICD-10-CM

## 2021-10-28 LAB
ALBUMIN SERPL BCP-MCNC: 3.5 G/DL (ref 3.5–5)
ALP SERPL-CCNC: 105 U/L (ref 46–116)
ALT SERPL W P-5'-P-CCNC: 25 U/L (ref 12–78)
ANION GAP SERPL CALCULATED.3IONS-SCNC: 6 MMOL/L (ref 4–13)
AST SERPL W P-5'-P-CCNC: 20 U/L (ref 5–45)
BASOPHILS # BLD AUTO: 0.05 THOUSANDS/ΜL (ref 0–0.1)
BASOPHILS NFR BLD AUTO: 1 % (ref 0–1)
BILIRUB SERPL-MCNC: 0.52 MG/DL (ref 0.2–1)
BUN SERPL-MCNC: 31 MG/DL (ref 5–25)
CA-I BLD-SCNC: 1.3 MMOL/L (ref 1.12–1.32)
CALCIUM SERPL-MCNC: 9.9 MG/DL (ref 8.3–10.1)
CHLORIDE SERPL-SCNC: 110 MMOL/L (ref 100–108)
CHOLEST SERPL-MCNC: 146 MG/DL (ref 50–200)
CO2 SERPL-SCNC: 25 MMOL/L (ref 21–32)
CREAT SERPL-MCNC: 1.84 MG/DL (ref 0.6–1.3)
EOSINOPHIL # BLD AUTO: 0.08 THOUSAND/ΜL (ref 0–0.61)
EOSINOPHIL NFR BLD AUTO: 1 % (ref 0–6)
ERYTHROCYTE [DISTWIDTH] IN BLOOD BY AUTOMATED COUNT: 15.4 % (ref 11.6–15.1)
EST. AVERAGE GLUCOSE BLD GHB EST-MCNC: 126 MG/DL
FERRITIN SERPL-MCNC: 12 NG/ML (ref 8–388)
GFR SERPL CREATININE-BSD FRML MDRD: 30 ML/MIN/1.73SQ M
GLUCOSE SERPL-MCNC: 72 MG/DL (ref 65–140)
HBA1C MFR BLD: 6 %
HCT VFR BLD AUTO: 31 % (ref 34.8–46.1)
HDLC SERPL-MCNC: 79 MG/DL
HGB BLD-MCNC: 9.1 G/DL (ref 11.5–15.4)
IMM GRANULOCYTES # BLD AUTO: 0.03 THOUSAND/UL (ref 0–0.2)
IMM GRANULOCYTES NFR BLD AUTO: 0 % (ref 0–2)
IRON SATN MFR SERPL: 10 % (ref 15–50)
IRON SERPL-MCNC: 33 UG/DL (ref 50–170)
LDLC SERPL CALC-MCNC: 59 MG/DL (ref 0–100)
LYMPHOCYTES # BLD AUTO: 1 THOUSANDS/ΜL (ref 0.6–4.47)
LYMPHOCYTES NFR BLD AUTO: 15 % (ref 14–44)
MCH RBC QN AUTO: 26.5 PG (ref 26.8–34.3)
MCHC RBC AUTO-ENTMCNC: 29.4 G/DL (ref 31.4–37.4)
MCV RBC AUTO: 90 FL (ref 82–98)
MONOCYTES # BLD AUTO: 0.6 THOUSAND/ΜL (ref 0.17–1.22)
MONOCYTES NFR BLD AUTO: 9 % (ref 4–12)
NEUTROPHILS # BLD AUTO: 5 THOUSANDS/ΜL (ref 1.85–7.62)
NEUTS SEG NFR BLD AUTO: 74 % (ref 43–75)
NONHDLC SERPL-MCNC: 67 MG/DL
NRBC BLD AUTO-RTO: 0 /100 WBCS
PHOSPHATE SERPL-MCNC: 3.4 MG/DL (ref 2.3–4.1)
PLATELET # BLD AUTO: 353 THOUSANDS/UL (ref 149–390)
PMV BLD AUTO: 9.6 FL (ref 8.9–12.7)
POTASSIUM SERPL-SCNC: 4.2 MMOL/L (ref 3.5–5.3)
PROT SERPL-MCNC: 6.8 G/DL (ref 6.4–8.2)
PTH-INTACT SERPL-MCNC: 122.7 PG/ML (ref 18.4–80.1)
RBC # BLD AUTO: 3.44 MILLION/UL (ref 3.81–5.12)
SODIUM SERPL-SCNC: 141 MMOL/L (ref 136–145)
TIBC SERPL-MCNC: 337 UG/DL (ref 250–450)
TRIGL SERPL-MCNC: 38 MG/DL
TSH SERPL DL<=0.05 MIU/L-ACNC: 1.72 UIU/ML (ref 0.36–3.74)
WBC # BLD AUTO: 6.76 THOUSAND/UL (ref 4.31–10.16)

## 2021-10-28 PROCEDURE — 80061 LIPID PANEL: CPT

## 2021-10-28 PROCEDURE — 87186 SC STD MICRODIL/AGAR DIL: CPT

## 2021-10-28 PROCEDURE — 83970 ASSAY OF PARATHORMONE: CPT

## 2021-10-28 PROCEDURE — 87086 URINE CULTURE/COLONY COUNT: CPT

## 2021-10-28 PROCEDURE — 82728 ASSAY OF FERRITIN: CPT

## 2021-10-28 PROCEDURE — 84443 ASSAY THYROID STIM HORMONE: CPT

## 2021-10-28 PROCEDURE — 83540 ASSAY OF IRON: CPT

## 2021-10-28 PROCEDURE — 83036 HEMOGLOBIN GLYCOSYLATED A1C: CPT

## 2021-10-28 PROCEDURE — 84100 ASSAY OF PHOSPHORUS: CPT

## 2021-10-28 PROCEDURE — 36415 COLL VENOUS BLD VENIPUNCTURE: CPT

## 2021-10-28 PROCEDURE — 83550 IRON BINDING TEST: CPT

## 2021-10-28 PROCEDURE — 82330 ASSAY OF CALCIUM: CPT

## 2021-10-28 PROCEDURE — 87077 CULTURE AEROBIC IDENTIFY: CPT

## 2021-10-28 PROCEDURE — 85025 COMPLETE CBC W/AUTO DIFF WBC: CPT

## 2021-10-28 PROCEDURE — 80053 COMPREHEN METABOLIC PANEL: CPT

## 2021-10-30 LAB
BACTERIA UR CULT: ABNORMAL
BACTERIA UR CULT: ABNORMAL

## 2021-11-01 ENCOUNTER — TELEPHONE (OUTPATIENT)
Dept: GERIATRICS | Age: 76
End: 2021-11-01

## 2021-11-02 ENCOUNTER — TELEMEDICINE (OUTPATIENT)
Dept: GERIATRICS | Age: 76
End: 2021-11-02
Payer: MEDICARE

## 2021-11-02 DIAGNOSIS — E11.22 TYPE 2 DIABETES MELLITUS WITH STAGE 3B CHRONIC KIDNEY DISEASE AND HYPERTENSION (HCC): ICD-10-CM

## 2021-11-02 DIAGNOSIS — H54.7 VISION IMPAIRMENT: ICD-10-CM

## 2021-11-02 DIAGNOSIS — R26.81 GAIT INSTABILITY: ICD-10-CM

## 2021-11-02 DIAGNOSIS — F41.9 ANXIETY: ICD-10-CM

## 2021-11-02 DIAGNOSIS — F02.80 LATE ONSET ALZHEIMER'S DISEASE WITHOUT BEHAVIORAL DISTURBANCE (HCC): Primary | ICD-10-CM

## 2021-11-02 DIAGNOSIS — M79.89 LEG SWELLING: ICD-10-CM

## 2021-11-02 DIAGNOSIS — Z87.440 HISTORY OF RECURRENT UTIS: ICD-10-CM

## 2021-11-02 DIAGNOSIS — G30.1 LATE ONSET ALZHEIMER'S DISEASE WITHOUT BEHAVIORAL DISTURBANCE (HCC): Primary | ICD-10-CM

## 2021-11-02 DIAGNOSIS — F33.1 MODERATE EPISODE OF RECURRENT MAJOR DEPRESSIVE DISORDER (HCC): ICD-10-CM

## 2021-11-02 DIAGNOSIS — I10 ESSENTIAL HYPERTENSION: ICD-10-CM

## 2021-11-02 DIAGNOSIS — N18.32 STAGE 3B CHRONIC KIDNEY DISEASE (HCC): ICD-10-CM

## 2021-11-02 DIAGNOSIS — I12.9 TYPE 2 DIABETES MELLITUS WITH STAGE 3B CHRONIC KIDNEY DISEASE AND HYPERTENSION (HCC): ICD-10-CM

## 2021-11-02 DIAGNOSIS — E78.49 OTHER HYPERLIPIDEMIA: ICD-10-CM

## 2021-11-02 DIAGNOSIS — N18.32 TYPE 2 DIABETES MELLITUS WITH STAGE 3B CHRONIC KIDNEY DISEASE AND HYPERTENSION (HCC): ICD-10-CM

## 2021-11-02 PROCEDURE — 99215 OFFICE O/P EST HI 40 MIN: CPT | Performed by: STUDENT IN AN ORGANIZED HEALTH CARE EDUCATION/TRAINING PROGRAM

## 2021-11-02 RX ORDER — MULTIVIT WITH MINERALS/LUTEIN
1 TABLET ORAL DAILY
Qty: 90 TABLET | Refills: 3 | Status: SHIPPED | OUTPATIENT
Start: 2021-11-02

## 2021-11-03 PROBLEM — N18.9 CKD (CHRONIC KIDNEY DISEASE): Status: ACTIVE | Noted: 2021-11-03

## 2021-11-03 PROBLEM — M79.89 ARM SWELLING: Status: RESOLVED | Noted: 2021-08-30 | Resolved: 2021-11-03

## 2021-11-03 PROBLEM — M79.89 RIGHT LEG SWELLING: Status: RESOLVED | Noted: 2021-08-30 | Resolved: 2021-11-03

## 2021-11-04 ENCOUNTER — TELEPHONE (OUTPATIENT)
Dept: GERIATRICS | Age: 76
End: 2021-11-04

## 2021-12-02 DIAGNOSIS — M79.89 LEG SWELLING: ICD-10-CM

## 2021-12-02 RX ORDER — FUROSEMIDE 20 MG/1
TABLET ORAL
Qty: 30 TABLET | Refills: 0 | Status: SHIPPED | OUTPATIENT
Start: 2021-12-02 | End: 2021-12-16

## 2021-12-16 ENCOUNTER — TELEPHONE (OUTPATIENT)
Dept: GERIATRICS | Age: 76
End: 2021-12-16

## 2021-12-16 ENCOUNTER — PATIENT MESSAGE (OUTPATIENT)
Dept: GERIATRICS | Age: 76
End: 2021-12-16

## 2021-12-16 DIAGNOSIS — R41.82 ALTERED MENTAL STATUS, UNSPECIFIED ALTERED MENTAL STATUS TYPE: Primary | ICD-10-CM

## 2021-12-16 DIAGNOSIS — M79.89 LEG SWELLING: ICD-10-CM

## 2021-12-16 RX ORDER — FUROSEMIDE 20 MG/1
TABLET ORAL
Qty: 45 TABLET | Refills: 1 | Status: SHIPPED | OUTPATIENT
Start: 2021-12-16 | End: 2022-06-19

## 2021-12-17 ENCOUNTER — TELEPHONE (OUTPATIENT)
Dept: GERIATRICS | Age: 76
End: 2021-12-17

## 2021-12-17 ENCOUNTER — TELEPHONE (OUTPATIENT)
Dept: PSYCHIATRY | Facility: CLINIC | Age: 76
End: 2021-12-17

## 2022-01-04 NOTE — ASSESSMENT & PLAN NOTE
· History of impaction   · Last BM 7/6/2021   · Continue MiraLax, senna, Colace hold for loose stools  · Ensure adequate hydration  · Encourage increase fiber intake no weight-bearing restrictions

## 2022-01-24 DIAGNOSIS — J30.9 ALLERGIC RHINITIS, UNSPECIFIED SEASONALITY, UNSPECIFIED TRIGGER: ICD-10-CM

## 2022-01-24 RX ORDER — FLUTICASONE PROPIONATE 50 MCG
SPRAY, SUSPENSION (ML) NASAL
Qty: 16 G | Refills: 2 | Status: SHIPPED | OUTPATIENT
Start: 2022-01-24 | End: 2022-03-15

## 2022-01-27 ENCOUNTER — TELEPHONE (OUTPATIENT)
Dept: GERIATRICS | Age: 77
End: 2022-01-27

## 2022-01-27 DIAGNOSIS — J06.9 VIRAL UPPER RESPIRATORY TRACT INFECTION: Primary | ICD-10-CM

## 2022-01-27 NOTE — TELEPHONE ENCOUNTER
----- Message from Sidra Vital MD sent at 1/27/2022  3:13 PM EST -----   Please call the patient's daughter and inquire if the patient has any shortness of breath or wheezing? If she does, she should go to the hospital   If no shortness of breath and fever, I can ordera chest x-ray, flu swab and RSV    However if symptoms worsen, she should be taken to the hospital

## 2022-01-27 NOTE — TELEPHONE ENCOUNTER
Called pt's daughter Lizy Vargas and she stated that she will try to get [facility] to take the pt for the testing and the swabs  Please place orders  Thank you  Daughter stated that the pt is taking Theraflu twice daily for the coughing and sneezing  She stated that it did help relieve the pt a bit  She also started to give the pt flonase  If there is any other recommendations that Dr Debbie Martinez

## 2022-02-04 ENCOUNTER — TELEPHONE (OUTPATIENT)
Dept: GERIATRICS | Age: 77
End: 2022-02-04

## 2022-02-04 ENCOUNTER — APPOINTMENT (OUTPATIENT)
Dept: LAB | Facility: AMBULARY SURGERY CENTER | Age: 77
End: 2022-02-04
Payer: MEDICARE

## 2022-02-04 ENCOUNTER — TELEMEDICINE (OUTPATIENT)
Dept: GERIATRICS | Age: 77
End: 2022-02-04
Payer: MEDICARE

## 2022-02-04 ENCOUNTER — OFFICE VISIT (OUTPATIENT)
Dept: OBGYN CLINIC | Facility: CLINIC | Age: 77
End: 2022-02-04
Payer: MEDICARE

## 2022-02-04 DIAGNOSIS — M17.0 BILATERAL PRIMARY OSTEOARTHRITIS OF KNEE: Primary | ICD-10-CM

## 2022-02-04 DIAGNOSIS — K59.01 SLOW TRANSIT CONSTIPATION: ICD-10-CM

## 2022-02-04 DIAGNOSIS — I12.9 TYPE 2 DIABETES MELLITUS WITH STAGE 3B CHRONIC KIDNEY DISEASE AND HYPERTENSION (HCC): ICD-10-CM

## 2022-02-04 DIAGNOSIS — Z12.12 SCREENING FOR COLORECTAL CANCER: ICD-10-CM

## 2022-02-04 DIAGNOSIS — N18.4 CKD (CHRONIC KIDNEY DISEASE) STAGE 4, GFR 15-29 ML/MIN (HCC): ICD-10-CM

## 2022-02-04 DIAGNOSIS — E55.9 VITAMIN D DEFICIENCY: ICD-10-CM

## 2022-02-04 DIAGNOSIS — E78.49 OTHER HYPERLIPIDEMIA: ICD-10-CM

## 2022-02-04 DIAGNOSIS — G30.1 LATE ONSET ALZHEIMER'S DISEASE WITHOUT BEHAVIORAL DISTURBANCE (HCC): Primary | ICD-10-CM

## 2022-02-04 DIAGNOSIS — R26.81 GAIT INSTABILITY: ICD-10-CM

## 2022-02-04 DIAGNOSIS — F41.9 ANXIETY: ICD-10-CM

## 2022-02-04 DIAGNOSIS — E11.22 TYPE 2 DIABETES MELLITUS WITH STAGE 3B CHRONIC KIDNEY DISEASE AND HYPERTENSION (HCC): ICD-10-CM

## 2022-02-04 DIAGNOSIS — Z78.0 ASYMPTOMATIC POSTMENOPAUSAL STATE: ICD-10-CM

## 2022-02-04 DIAGNOSIS — Z87.440 HISTORY OF RECURRENT UTIS: ICD-10-CM

## 2022-02-04 DIAGNOSIS — N18.32 STAGE 3B CHRONIC KIDNEY DISEASE (HCC): ICD-10-CM

## 2022-02-04 DIAGNOSIS — M17.0 BILATERAL PRIMARY OSTEOARTHRITIS OF KNEE: ICD-10-CM

## 2022-02-04 DIAGNOSIS — F33.1 MODERATE EPISODE OF RECURRENT MAJOR DEPRESSIVE DISORDER (HCC): ICD-10-CM

## 2022-02-04 DIAGNOSIS — E11.9 ENCOUNTER FOR DIABETIC FOOT EXAM (HCC): ICD-10-CM

## 2022-02-04 DIAGNOSIS — Z00.00 MEDICARE ANNUAL WELLNESS VISIT, SUBSEQUENT: ICD-10-CM

## 2022-02-04 DIAGNOSIS — N18.32 TYPE 2 DIABETES MELLITUS WITH STAGE 3B CHRONIC KIDNEY DISEASE AND HYPERTENSION (HCC): ICD-10-CM

## 2022-02-04 DIAGNOSIS — Z12.31 ENCOUNTER FOR SCREENING MAMMOGRAM FOR BREAST CANCER: ICD-10-CM

## 2022-02-04 DIAGNOSIS — N18.4 CHRONIC KIDNEY DISEASE (CKD) STAGE G4/A2, SEVERELY DECREASED GLOMERULAR FILTRATION RATE (GFR) BETWEEN 15-29 ML/MIN/1.73 SQUARE METER AND ALBUMINURIA CREATININE RATIO BETWEEN 30-299 MG/G (HCC): ICD-10-CM

## 2022-02-04 DIAGNOSIS — F02.80 LATE ONSET ALZHEIMER'S DISEASE WITHOUT BEHAVIORAL DISTURBANCE (HCC): Primary | ICD-10-CM

## 2022-02-04 DIAGNOSIS — E87.8 LOW BICARBONATE LEVEL: ICD-10-CM

## 2022-02-04 DIAGNOSIS — E83.52 HYPERCALCEMIA: ICD-10-CM

## 2022-02-04 DIAGNOSIS — Z12.11 SCREENING FOR COLORECTAL CANCER: ICD-10-CM

## 2022-02-04 DIAGNOSIS — Z13.820 OSTEOPOROSIS SCREENING: ICD-10-CM

## 2022-02-04 DIAGNOSIS — I10 ESSENTIAL HYPERTENSION: ICD-10-CM

## 2022-02-04 LAB
ALBUMIN SERPL BCP-MCNC: 3.4 G/DL (ref 3.5–5)
ALP SERPL-CCNC: 122 U/L (ref 46–116)
ALT SERPL W P-5'-P-CCNC: 25 U/L (ref 12–78)
ANION GAP SERPL CALCULATED.3IONS-SCNC: 8 MMOL/L (ref 4–13)
AST SERPL W P-5'-P-CCNC: 14 U/L (ref 5–45)
BILIRUB SERPL-MCNC: 0.31 MG/DL (ref 0.2–1)
BUN SERPL-MCNC: 28 MG/DL (ref 5–25)
CA-I BLD-SCNC: 1.32 MMOL/L (ref 1.12–1.32)
CALCIUM ALBUM COR SERPL-MCNC: 10.6 MG/DL (ref 8.3–10.1)
CALCIUM SERPL-MCNC: 10.1 MG/DL (ref 8.3–10.1)
CHLORIDE SERPL-SCNC: 113 MMOL/L (ref 100–108)
CO2 SERPL-SCNC: 21 MMOL/L (ref 21–32)
CREAT SERPL-MCNC: 2.07 MG/DL (ref 0.6–1.3)
GFR SERPL CREATININE-BSD FRML MDRD: 22 ML/MIN/1.73SQ M
GLUCOSE SERPL-MCNC: 198 MG/DL (ref 65–140)
POTASSIUM SERPL-SCNC: 3.5 MMOL/L (ref 3.5–5.3)
PROT SERPL-MCNC: 7.4 G/DL (ref 6.4–8.2)
PTH-INTACT SERPL-MCNC: 148.8 PG/ML (ref 18.4–80.1)
SODIUM SERPL-SCNC: 142 MMOL/L (ref 136–145)

## 2022-02-04 PROCEDURE — 82330 ASSAY OF CALCIUM: CPT

## 2022-02-04 PROCEDURE — 84165 PROTEIN E-PHORESIS SERUM: CPT | Performed by: PATHOLOGY

## 2022-02-04 PROCEDURE — 99213 OFFICE O/P EST LOW 20 MIN: CPT | Performed by: PHYSICAL MEDICINE & REHABILITATION

## 2022-02-04 PROCEDURE — 80053 COMPREHEN METABOLIC PANEL: CPT

## 2022-02-04 PROCEDURE — G0438 PPPS, INITIAL VISIT: HCPCS | Performed by: STUDENT IN AN ORGANIZED HEALTH CARE EDUCATION/TRAINING PROGRAM

## 2022-02-04 PROCEDURE — 36415 COLL VENOUS BLD VENIPUNCTURE: CPT

## 2022-02-04 PROCEDURE — 84165 PROTEIN E-PHORESIS SERUM: CPT

## 2022-02-04 PROCEDURE — 83970 ASSAY OF PARATHORMONE: CPT

## 2022-02-04 PROCEDURE — 20610 DRAIN/INJ JOINT/BURSA W/O US: CPT | Performed by: PHYSICAL MEDICINE & REHABILITATION

## 2022-02-04 PROCEDURE — 83521 IG LIGHT CHAINS FREE EACH: CPT

## 2022-02-04 PROCEDURE — 99215 OFFICE O/P EST HI 40 MIN: CPT | Performed by: STUDENT IN AN ORGANIZED HEALTH CARE EDUCATION/TRAINING PROGRAM

## 2022-02-04 RX ORDER — TRIAMCINOLONE ACETONIDE 40 MG/ML
40 INJECTION, SUSPENSION INTRA-ARTICULAR; INTRAMUSCULAR
Status: COMPLETED | OUTPATIENT
Start: 2022-02-04 | End: 2022-02-04

## 2022-02-04 RX ORDER — LIDOCAINE HYDROCHLORIDE 10 MG/ML
3 INJECTION, SOLUTION INFILTRATION; PERINEURAL
Status: COMPLETED | OUTPATIENT
Start: 2022-02-04 | End: 2022-02-04

## 2022-02-04 RX ADMIN — TRIAMCINOLONE ACETONIDE 40 MG: 40 INJECTION, SUSPENSION INTRA-ARTICULAR; INTRAMUSCULAR at 14:03

## 2022-02-04 RX ADMIN — LIDOCAINE HYDROCHLORIDE 3 ML: 10 INJECTION, SOLUTION INFILTRATION; PERINEURAL at 14:03

## 2022-02-04 NOTE — PROGRESS NOTES
Assessment and Plan:     Problem List Items Addressed This Visit        Digestive    Slow transit constipation     Encourage increased fiber intake  Continue Senokot S  Physical activity as able  Will continue to monitor            Endocrine    Type 2 diabetes mellitus with stage 3b chronic kidney disease and hypertension ([facility] )       Lab Results   Component Value Date    HGBA1C 6 0 (H) 10/28/2021     Controlled  Continue linagliptin  Recommend adherence to a diabetic, heart healthy diet  Will continue to monitor periodically         Relevant Orders    Ambulatory referral to Podiatry       Cardiovascular and Mediastinum    Essential hypertension     Patient continues on amlodipine, hydralazine, losartan  Recommend adherence to a diabetic, low-sodium, heart healthy diet            Nervous and Auditory    Late onset Alzheimer's disease without behavioral disturbance ([facility] ) - Primary     Patient with severe dementia with behaviors  Maintained on multiple psychotropic agents previously prescribed by Psychiatry  Continues on Aricept, Namenda  Mood also managed with risperidone, citalopram, bupropion, trazodone  Advised daughter to follow-up with Psychiatry  Also discussed with daughter about my recommendation to start palliative home care services    Daughter in agreement and referral placed to Estrellitavishal jean  Patient continues to meet criteria for placement to higher level of care such as a nursing home facility or dementia unit  Continues to have daily home health aides for supervision as goal is to keep the patient at home  Daughter with significant caregiver burnout and have previously recommended her attending are caregiver support group or our therapists for supportive therapy  Reorientation and redirection as needed  Manage chronic conditions  Maintain Falls precautions  Encourage patient to remain active mentally, physically and socially  Participate in cognitively challenging exercises as able Musculoskeletal and Integument    Bilateral primary osteoarthritis of knee     Patient currently following with Orthopedics  Received steroid knee injections today  Maintain Falls precautions  Continue physical therapy            Genitourinary    CKD (chronic kidney disease)     Lab Results   Component Value Date    EGFR 22 02/04/2022    EGFR 30 10/28/2021    EGFR 30 06/21/2021    CREATININE 2 07 (H) 02/04/2022    CREATININE 1 84 (H) 10/28/2021    CREATININE 1 85 (H) 06/21/2021   Avoid nephrotoxic agents  Medications to be renally dosed  Patient continues to follow with Nephrology            Other    Anxiety (Chronic)     Dementia with behaviors  Patient previously seen by Psychiatry and prescribed Wellbutrin, risperidone, citalopram, trazodone  Advised daughter to schedule a follow-up appointment with Psychiatry  Will request MSW to assist with scheduling         History of recurrent UTIs     Referral placed to Urology for recurrent UTIs  Advised on hygiene techniques given pt's severity of dementia         Moderate episode of recurrent major depressive disorder ([facility] )     Patient continues to have behaviors given her history of severe dementia  Previously seen by Psychiatry and on multiple psychotropic agents  Patient maintained on trazodone, bupropion, citalopram, risperidone  Advised daughter to follow-up with Psychiatry given multiple psychotropic medications previously prescribed by Psychiatry  Daughter requesting an alternative provider with Psychiatry if she is to follow-up with them  Discussed with daughter also about placing a referral for home palliative care given severity of dementia with behaviors  No HI/SI  Reorientation redirection as needed  Encourage patient remain active mentally, physically and socially         Other hyperlipidemia     Patient maintained on Lipitor 40 mg daily  Recommend adherence to a diabetic, heart healthy diet  Will continue to monitor periodically         Gait instability Patient continues on physical therapy for gait training, balance and strengthening   Bed-bound for the most part  Maintain [place] precautions  Referral placed to palliative care         Medicare annual wellness visit, subsequent     Reviewed preventative screenings  Daughter requesting screenings to be placed at this time  Order for colonoscopy, DEXA scan, mammogram, referral to Podiatry placed  Reviewed immunizations  Realistically, relate to daughter, I would avoid any invasive procedures, however daughter requesting screenings to be placed which are needed  Discussed referring to palliative care for supportive therapy which daughter is in agreement with  Referral placed to Ashley jean  Discussed ensuring advanced directives in place which daughter will review with her brother, a              Other Visit Diagnoses     Encounter for screening mammogram for breast cancer        Relevant Orders    Mammo screening bilateral w 3d & cad    Screening for colorectal cancer        Relevant Orders    Ambulatory referral for Colonoscopy    Osteoporosis screening        Encounter for diabetic foot exam [facility])        Relevant Orders    Ambulatory referral to Podiatry    Asymptomatic postmenopausal state        Relevant Orders    DXA bone density spine hip and pelvis        See additional note for HPI/Physical Exam    Depression Screening and Follow-up Plan: Continue regular follow-up with their mental health provider who is managing their mental health condition(s)  Preventive health issues were discussed with patient, and age appropriate screening tests were ordered as noted in patient's After Visit Summary  Personalized health advice and appropriate referrals for health education or preventive services given if needed, as noted in patient's After Visit Summary       History of Present Illness:     Patient presents for Medicare Annual Wellness visit    Patient Care Team:  Pascual Bartholomew MD as PCP - General (Geriatric Medicine)     Problem List:     Patient Active Problem List   Diagnosis    History of recurrent UTIs    Sleep difficulties    Late onset Alzheimer's disease without behavioral disturbance (HCC)    Moderate episode of recurrent major depressive disorder [facility]    Vitamin D deficiency    Other hyperlipidemia    Type 2 diabetes mellitus with stage 3b chronic kidney disease and hypertension ([facility] )    Bilateral primary osteoarthritis of knee    Polypharmacy    Anxiety    Gait instability    Vertigo    Essential hypertension    Slow transit constipation    Urine incontinence    Low bicarbonate level    Leg swelling    Vision impairment    CKD (chronic kidney disease)    Medicare annual wellness visit, subsequent      Past Medical and Surgical History:     Past Medical History:   Diagnosis Date    Acute cystitis without hematuria 8/13/2020    Allergic rhinitis 5/18/2021    Anxiety     Arm swelling 8/30/2021    Cholesterol depletion     Confusion     Confusion 5/17/2021    Dementia ([facility] )     Depression     Diabetes ([facility] )     Early onset Alzheimer's dementia ([facility] )     Encounter for screening colonoscopy 9/22/2020    Glaucoma     Hypertension     Hypertension     Memory loss     Right leg swelling 8/30/2021     Past Surgical History:   Procedure Laterality Date    CYSTOSCOPY  06/10/2021    HYSTERECTOMY        Family History:     Family History   Problem Relation Age of Onset    Diabetes Mother     Hypertension Mother       Social History:     Social History     Socioeconomic History    Marital status: Single     Spouse name: None    Number of children: 6    Years of education: 12    Highest education level:  Bachelor's degree (e g , BA, AB, BS)   Occupational History    Occupation: retired   Tobacco Use    Smoking status: Former Smoker     Types: Cigarettes     Quit date: 7/30/2018     Years since quitting: 3 5    Smokeless tobacco: Never Used   Vaping Use    Vaping Use: Never used   Substance and Sexual Activity    Alcohol use: Not Currently    Drug use: Never    Sexual activity: Not Currently   Other Topics Concern    None   Social History Narrative    None     Social Determinants of Health     Financial Resource Strain: Not on file   Food Insecurity: Not on file   Transportation Needs: Not on file   Physical Activity: Not on file   Stress: Not on file   Social Connections: Not on file   Intimate Partner Violence: Not on file   Housing Stability: Not on file      Medications and Allergies:     Current Outpatient Medications   Medication Sig Dispense Refill    amLODIPine (NORVASC) 10 mg tablet TAKE 1 TABLET BY MOUTH EVERY DAY 90 tablet 1    Ascorbic Acid (VITAMIN C) 100 MG tablet Take 100 mg by mouth daily      aspirin (ECOTRIN LOW STRENGTH) 81 mg EC tablet Take 81 mg by mouth daily      atorvastatin (LIPITOR) 40 mg tablet Take 1 tablet (40 mg total) by mouth daily 90 tablet 2    Azopt 1 % ophthalmic suspension       Brimonidine Tartrate-Timolol (COMBIGAN OP) Apply to eye      buPROPion (WELLBUTRIN SR) 150 mg 12 hr tablet Take 1 tablet (150 mg total) by mouth 2 (two) times a day 180 tablet 2    Calcifediol ER (Rayaldee) 30 MCG CPCR Take 30 mcg by mouth daily 14 capsule 0    cetirizine (ZyrTEC) 10 mg tablet Take 10 mg by mouth daily      citalopram (CeleXA) 40 mg tablet Take 1 tablet (40 mg total) by mouth daily 90 tablet 2    diclofenac sodium (VOLTAREN) 1 % Apply 2 g topically 3 (three) times a day as needed (Pain) 100 g 1    donepezil (ARICEPT) 10 mg tablet Take 1 tablet (10 mg total) by mouth daily at bedtime 180 tablet 0    dorzolamide (TRUSOPT) 2 % ophthalmic solution 1 drop 3 (three) times a day      ferrous sulfate 325 (65 Fe) mg tablet Take 325 mg by mouth daily with breakfast      fluticasone (FLONASE) 50 mcg/act nasal spray INSTILL ONE (1) SPRAY IN EACH NOSTRIL DAILY 16 g 2    furosemide (LASIX) 20 mg tablet TAKE 1/2 TABLET BY MOUTH EVERY DAY 45 tablet 1    hydrALAZINE (APRESOLINE) 25 mg tablet TAKE 1 TABLET BY MOUTH THREE TIMES A  tablet 1    Incontinence Supply Disposable ([place] Fresh Moist Wipes) MISC Use 6 (six) times a day 140 each 5    Incontinence Supply Disposable (Depend Underwear Large/XL) MISC Use 6 (six) times a day 180 each 5    Latanoprostene Bunod (Vyzulta) 0 024 % SOLN Apply to eye      linaGLIPtin (Tradjenta) 5 MG TABS Take 5 mg by mouth daily 90 tablet 3    losartan (COZAAR) 25 mg tablet Take 1 tablet (25 mg total) by mouth daily 90 tablet 3    meclizine (ANTIVERT) 25 mg tablet Take 1 tablet (25 mg total) by mouth 2 (two) times a day 180 tablet 3    memantine (NAMENDA) 10 mg tablet Take 1 tablet (10 mg total) by mouth 2 (two) times a day 180 tablet 3    Multiple Vitamins-Minerals (Centrum Silver) tablet Take 1 tablet by mouth daily 90 tablet 3    omeprazole (PriLOSEC) 20 mg delayed release capsule Take 20 mg by mouth daily      polyethylene glycol (MIRALAX) 17 g packet Take 17 g by mouth daily as needed (constipation) 1 each 3    risperiDONE (RisperDAL) 0 25 mg tablet Take 1 tablet (0 25 mg total) by mouth 2 (two) times a day 180 tablet 3    senna-docusate sodium (SENOKOT S) 8 6-50 mg per tablet Take 1 tablet by mouth daily at bedtime 90 tablet 3    traZODone (DESYREL) 50 mg tablet Take 1 tablet (50 mg total) by mouth daily at bedtime 90 tablet 3     No current facility-administered medications for this visit       Allergies   Allergen Reactions    Codeine       Immunizations:     Immunization History   Administered Date(s) Administered    COVID-19 PFIZER VACCINE 0 3 ML IM 03/31/2021, 04/23/2021      Health Maintenance:         Topic Date Due    Hepatitis C Screening  Never done    Breast Cancer Screening: Mammogram  Never done    Colorectal Cancer Screening  Never done         Topic Date Due    Pneumococcal Vaccine: 65+ Years (1 of 2 - PPSV23) Never done    DTaP,Tdap,and Td Vaccines (1 - Tdap) Never done  Influenza Vaccine (1) Never done    COVID-19 Vaccine (3 - Booster for Pfizer series) 09/23/2021      Medicare Health Risk Assessment:     There were no vitals taken for this visit  Audra Braga is here for her Subsequent Wellness visit  Historian  Patient cannot answer questions due to cognitive impairment, intelluctual disability, or expressive limitations  Health Risk Assessment:   Patient rates overall health as fair  Patient feels that their physical health rating is much worse  Patient is satisfied with their life  Eyesight was rated as slightly worse  Hearing was rated as same  Patient feels that their emotional and mental health rating is much worse  Patients states they are often angry  Patient states they are often unusually tired/fatigued  Pain experienced in the last 7 days has been some  Patient's pain rating has been 8/10  Patient states that she has experienced weight loss or gain in last 6 months  Lost weight because mobility has declined, chronic knee pain    Depression Screening:   PHQ-9 Score: 9      Fall Risk Screening: In the past year, patient has experienced: history of falling in past year    Number of falls: 2 or more  Injured during fall?: No    Feels unsteady when standing or walking?: Yes    Worried about falling?: Yes      Urinary Incontinence Screening:   Patient has leaked urine accidently in the last six months  Home Safety:  Patient has trouble with stairs inside or outside of their home  Patient has working smoke alarms and has working carbon monoxide detector  Home safety hazards include: none  Nutrition:   Current diet is Regular  Medications:   Patient is currently taking over-the-counter supplements  OTC medications include: see medication list  Patient is not able to manage medications       Activities of Daily Living (ADLs)/Instrumental Activities of Daily Living (IADLs):   Walk and transfer into and out of bed and chair?: No  Dress and groom yourself?: No    Bathe or shower yourself?: No    Feed yourself? Yes  Do your laundry/housekeeping?: No  Manage your money, pay your bills and track your expenses?: No  Make your own meals?: No    Do your own shopping?: No    ADL comments: Pt can do finger foods on her own but is unable to use utensils     Previous Hospitalizations:   Any hospitalizations or ED visits within the last 12 months?: Yes      Advance Care Planning:   Living will: No    Durable POA for healthcare: No      Cognitive Screening:   Provider or family/friend/caregiver concerned regarding cognition?: Yes  Mini-Cog Score: 0  Interpretation: Mini-Cog Score 0-2: Positive screen for dementia    PREVENTIVE SCREENINGS      Cardiovascular Screening:    General: History Lipid Disorder and Screening Current      Diabetes Screening:     General: History Diabetes and Screening Current      Colorectal Cancer Screening:     General: Risks and Benefits Discussed    Due for: Colonoscopy - Low Risk      Breast Cancer Screening:       Due for: Mammogram        Cervical Cancer Screening:    General: Screening Not Indicated      Osteoporosis Screening:      Due for: DXA Axial      Abdominal Aortic Aneurysm (AAA) Screening:        General: Screening Not Indicated      Lung Cancer Screening:     General: Screening Not Indicated    Screening, Brief Intervention, and Referral to Treatment (SBIRT)    Screening  Typical number of drinks in a day: 0  Typical number of drinks in a week: 0  Interpretation: Low risk drinking behavior      Single Item Drug Screening:  How often have you used an illegal drug (including marijuana) or a prescription medication for non-medical reasons in the past year? never    Single Item Drug Screen Score: 0  Interpretation: Negative screen for possible drug use disorder    Other Counseling Topics:   Car/seat belt/driving safety and skin self-exam        Rachna Woodward MD  Virtual AWV Consent    Verification of patient location:    Patient is located in the following state in which I hold an active license [place]    Reason for visit is  Her AWV and follow up of acute and chronic conditions    Encounter provider Deon Spivey MD    Provider located at [address] [place] [address] [address] [place] [address]  [phone]      Recent Visits  Date Type Provider Dept   02[facility]04[facility]   02/04/22 [address],  [address]   Showing recent visits within past 7 days and meeting all other requirements  Future Appointments  No visits were found meeting these conditions  Showing future appointments within next 150 days and meeting all other requirements       After connecting through [employer], the patient was identified by name and date of birth  Syeda Dianabrian was informed that this is a telemedicine visit and that the visit is being conducted through [employer] and patient was informed that this is a secure, HIPAA-compliant platform  She agrees to proceed  My office door was closed  No one else was in the room  She acknowledged consent and understanding of privacy and security of the video platform  Syeda Cheri verbally agrees to participate in [employer]  Pt is aware that [employer] could be limited without vital signs or the ability to perform a full hands-on physical Lesa Mendez understands she or the provider may request at any time to terminate the video visit and request the patient to seek care or treatment in person  Patient is aware this is a billable service

## 2022-02-04 NOTE — TELEPHONE ENCOUNTER
Left voicemail for daughter to call office  Need to schedule 3 month virtual follow up appointment  Also need to confirm name of patient's urologist (is it iSd Gonzalez PA-C and Saritha Green MD at [facility])  When spouse confirms urologist name, please call urology to schedule patient appointment for "recurrant UTI"

## 2022-02-04 NOTE — PROGRESS NOTES
1  Bilateral primary osteoarthritis of knee  Ambulatory referral to Pain Management     Orders Placed This Encounter   Procedures    Large joint arthrocentesis    Large joint arthrocentesis    Ambulatory referral to Pain Management      Impression: This is a patient with Alzheimer's dementia who presents with chronic bilateral knee pain likely secondary to osteoarthritis as below  Patient finished a viscosupplementation series this past February 2021   She has also had multiple steroid injections into both of her knees in the past      Most of the history is obtained from her daughter due to the patient's dementia      We discussed different treatment options and decided to proceed with repeat bilateral knee intra-articular injections with steroid      Patient and her daughter are interested in pain management consultation for consideration of genicular nerve blocks      She has been using a medial  for the left knee and a hinged knee brace for the right knee       She can continue with Voltaren gel  Imaging Studies (I personally reviewed images in PACS and report):  Bilateral knee x-rays from August 2020 show Kellgren Isaiah grade 4 osteoarthritis  No follow-ups on file  Patient is in agreement with the above plan  HPI:  Bri Garvey is a 77 y o  female  who presents in follow up  Here for   Chief Complaint   Patient presents with    Left Knee - Pain, Follow-up    Right Knee - Pain, Follow-up       Since last visit:  Pain has returned      Following history reviewed and updated:  Past Medical History:   Diagnosis Date    Acute cystitis without hematuria 8/13/2020    Allergic rhinitis 5/18/2021    Anxiety     Arm swelling 8/30/2021    Cholesterol depletion     Confusion     Confusion 5/17/2021    Dementia (HCC)     Depression     Diabetes [facility] [address] [facility]     Early onset Alzheimer's dementia ([facility] [address] [facility]     Encounter for screening colonoscopy 9/22/2020    Glaucoma     Hypertension     Hypertension     Memory loss     Right leg swelling 8/30/2021     Past Surgical History:   Procedure Laterality Date    CYSTOSCOPY  06/10/2021    HYSTERECTOMY       Social History   Social History     Substance and Sexual Activity   Alcohol Use Not Currently     Social History     Substance and Sexual Activity   Drug Use Never     Social History     Tobacco Use   Smoking Status Former Smoker    Types: Cigarettes    Quit date: 7/30/2018    Years since quitting: 3 5   Smokeless Tobacco Never Used     Family History   Problem Relation Age of Onset    Diabetes Mother     Hypertension Mother      Allergies   Allergen Reactions    Codeine         Constitutional:  There were no vitals taken for this visit  General: NAD  Eyes: Clear sclerae  ENT: No inflammation, lesion, or mass of lips  No tracheal deviation  Musculoskeletal: As mentioned below  Integumentary: No visible rashes or skin lesions  Pulmonary/Chest: Effort normal  No respiratory distress  Neuro: CN's grossly intact, CLANCY  Psych: Normal affect and judgement  Vascular: WWP  Right Knee Exam     Tenderness   The patient is experiencing tenderness in the medial joint line and lateral joint line  Range of Motion   Extension: normal   Flexion: abnormal     Tests   Varus: negative Valgus: negative    Other   Erythema: absent  Scars: absent  Sensation: normal  Pulse: present  Swelling: mild  Effusion: effusion present      Left Knee Exam     Tenderness   The patient is experiencing tenderness in the medial joint line and lateral joint line      Range of Motion   Extension: normal   Flexion: abnormal     Tests   Varus: negative Valgus: negative    Other   Erythema: absent  Scars: absent  Sensation: normal  Pulse: present  Swelling: mild  Effusion: effusion present             Large joint arthrocentesis: R knee  Universal Protocol:  Consent given by: patient  Timeout called at: 2/4/2022 2:01 PM   Site marked: the operative site was marked  Supporting Documentation  Indications: pain   Procedure Details  Location: knee - R knee  Needle gauge: 21G 2''  Ultrasound guidance: no  Approach: Inferolateral to patella  Medications administered: 40 mg triamcinolone acetonide 40 mg/mL; 3 mL lidocaine 1 %    Patient tolerance: patient tolerated the procedure well with no immediate complications  Dressing:  Sterile dressing applied    A 2 inch needle was used and I was able to hub the entire needle  This makes it fairly certain that I am within the intercondylar notch/joint space  There was little to no resistance encountered during the injection  Prior to the procedure, the patient was informed of the following risks in layman terms:    - Risk of bleeding since a needle is involved  - Risk of infection (1/10,000 chance as per recent studies)  Signs/symptoms were discussed and they would prompt an urgent evaluation at an emergency department   - Risk of pigmentation or skin dimpling in the skin (2-3% chance as per recent studies) from the steroid  - Risk of increased pain from steroid flare (1% chance as per recent studies) that typically lasts 24-48 hours  - Risk of increased blood sugars from the steroid medication that can last for a few weeks  If the patient is a diabetic or pre-diabetic, they were encouraged to closely monitor their blood sugars and discuss with PCP if elevated more than usual or if having symptoms  After going over these risks, we decided that the benefits outweigh the risks and proceeded with the procedure  Large joint arthrocentesis: L knee  Universal Protocol:  Consent given by: patient  Timeout called at: 2/4/2022 2:01 PM   Site marked: the operative site was marked  Supporting Documentation  Indications: pain   Procedure Details  Location: knee - L knee  Needle gauge: 21G 2''  Ultrasound guidance: no  Approach: Inferolateral to patella    Medications administered: 40 mg triamcinolone acetonide 40 mg/mL; 3 mL lidocaine 1 %    Patient tolerance: patient tolerated the procedure well with no immediate complications  Dressing:  Sterile dressing applied    A 2 inch needle was used and I was able to hub the entire needle  This makes it fairly certain that I am within the intercondylar notch/joint space  There was little to no resistance encountered during the injection  Prior to the procedure, the patient was informed of the following risks in layman terms:    - Risk of bleeding since a needle is involved  - Risk of infection (1/10,000 chance as per recent studies)  Signs/symptoms were discussed and they would prompt an urgent evaluation at an emergency department   - Risk of pigmentation or skin dimpling in the skin (2-3% chance as per recent studies) from the steroid  - Risk of increased pain from steroid flare (1% chance as per recent studies) that typically lasts 24-48 hours  - Risk of increased blood sugars from the steroid medication that can last for a few weeks  If the patient is a diabetic or pre-diabetic, they were encouraged to closely monitor their blood sugars and discuss with PCP if elevated more than usual or if having symptoms  After going over these risks, we decided that the benefits outweigh the risks and proceeded with the procedure

## 2022-02-04 NOTE — PATIENT INSTRUCTIONS
Medicare Preventive Visit Patient Instructions  Thank you for completing your Welcome to Medicare Visit or Medicare Annual Wellness Visit today  Your next wellness visit will be due in one year (2/5/2023)  The screening/preventive services that you may require over the next 5-10 years are detailed below  Some tests may not apply to you based off risk factors and/or age  Screening tests ordered at today's visit but not completed yet may show as past due  Also, please note that scanned in results may not display below  Preventive Screenings:  Service Recommendations Previous Testing/Comments   Colorectal Cancer Screening  * Colonoscopy    * Fecal Occult Blood Test (FOBT)/Fecal Immunochemical Test (FIT)  * Fecal DNA/Cologuard Test  * Flexible Sigmoidoscopy Age: 54-65 years old   Colonoscopy: every 10 years (may be performed more frequently if at higher risk)  OR  FOBT/FIT: every 1 year  OR  Cologuard: every 3 years  OR  Sigmoidoscopy: every 5 years  Screening may be recommended earlier than age 48 if at higher risk for colorectal cancer  Also, an individualized decision between you and your healthcare provider will decide whether screening between the ages of 74-80 would be appropriate  Colonoscopy: Not on file  FOBT/FIT: Not on file  Cologuard: Not on file  Sigmoidoscopy: Not on file          Breast Cancer Screening Age: 36 years old  Frequency: every 1-2 years  Not required if history of left and right mastectomy Mammogram: Not on file        Cervical Cancer Screening Between the ages of 21-29, pap smear recommended once every 3 years  Between the ages of 33-67, can perform pap smear with HPV co-testing every 5 years     Recommendations may differ for women with a history of total hysterectomy, cervical cancer, or abnormal pap smears in past  Pap Smear: Not on file    Screening Not Indicated   Hepatitis C Screening Once for adults born between [facility]  More frequently in patients at high risk for Hepatitis C Hep C Antibody: Not on file        Diabetes Screening 1-2 times per year if you're at risk for diabetes or have pre-diabetes Fasting glucose: 81 mg/dL   A1C: 6 0 %    Screening Not Indicated  History Diabetes   Cholesterol Screening Once every 5 years if you don't have a lipid disorder  May order more often based on risk factors  Lipid panel: 10/28/2021    Screening Not Indicated  History Lipid Disorder     Other Preventive Screenings Covered by Medicare:  1  Abdominal Aortic Aneurysm (AAA) Screening: covered once if your at risk  You're considered to be at risk if you have a family history of AAA  2  Lung Cancer Screening: covers low dose CT scan once per year if you meet all of the following conditions: (1) Age 50-69; (2) No signs or symptoms of lung cancer; (3) Current smoker or have quit smoking within the last 15 years; (4) You have a tobacco smoking history of at least 30 pack years (packs per day multiplied by number of years you smoked); (5) You get a written order from a healthcare provider  3  Glaucoma Screening: covered annually if you're considered high risk: (1) You have diabetes OR (2) Family history of glaucoma OR (3)  aged 48 and older OR (3)  American aged 72 and older  3  Osteoporosis Screening: covered every 2 years if you meet one of the following conditions: (1) You're estrogen deficient and at risk for osteoporosis based off medical history and other findings; (2) Have a vertebral abnormality; (3) On glucocorticoid therapy for more than 3 months; (4) Have primary hyperparathyroidism; (5) On osteoporosis medications and need to assess response to drug therapy  · Last bone density test (DXA Scan): Not on file  5  HIV Screening: covered annually if you're between the age of 12-76  Also covered annually if you are younger than 13 and older than 72 with risk factors for HIV infection   For pregnant patients, it is covered up to 3 times per pregnancy  Immunizations:  Immunization Recommendations   Influenza Vaccine Annual influenza vaccination during flu season is recommended for all persons aged >= 6 months who do not have contraindications   Pneumococcal Vaccine (Prevnar and Pneumovax)  * Prevnar = PCV13  * Pneumovax = PPSV23   Adults 25-60 years old: 1-3 doses may be recommended based on certain risk factors  Adults 72 years old: Prevnar (PCV13) vaccine recommended followed by Pneumovax (PPSV23) vaccine  If already received PPSV23 since turning 65, then PCV13 recommended at least one year after PPSV23 dose  Hepatitis B Vaccine 3 dose series if at intermediate or high risk (ex: diabetes, end stage renal disease, liver disease)   Tetanus (Td) Vaccine - COST NOT COVERED BY MEDICARE PART B Following completion of primary series, a booster dose should be given every 10 years to maintain immunity against tetanus  Td may also be given as tetanus wound prophylaxis  Tdap Vaccine - COST NOT COVERED BY MEDICARE PART B Recommended at least once for all adults  For pregnant patients, recommended with each pregnancy  Shingles Vaccine (Shingrix) - COST NOT COVERED BY MEDICARE PART B  2 shot series recommended in those aged 48 and above     Health Maintenance Due:      Topic Date Due    Hepatitis C Screening  Never done    Breast Cancer Screening: Mammogram  Never done    Colorectal Cancer Screening  Never done     Immunizations Due:      Topic Date Due    Pneumococcal Vaccine: 65+ Years (1 of 2 - PPSV23) Never done    DTaP,Tdap,and Td Vaccines (1 - Tdap) Never done    Influenza Vaccine (1) Never done    COVID-19 Vaccine (3 - Booster for [employer] series) 09/23/2021     Advance Directives   What are advance directives? Advance directives are legal documents that state your wishes and plans for medical care  These plans are made ahead of time in case you lose your ability to make decisions for yourself   Advance directives can apply to any medical decision, such as the treatments you want, and if you want to donate organs  What are the types of advance directives? There are many types of advance directives, and each state has rules about how to use them  You may choose a combination of any of the following:  · Living will: This is a written record of the treatment you want  You can also choose which treatments you do not want, which to limit, and which to stop at a certain time  This includes surgery, medicine, IV fluid, and tube feedings  · Durable power of  for healthcare [facility] [place]): This is a written record that states who you want to make healthcare choices for you when you are unable to make them for yourself  This person, called a proxy, is usually a family member or a friend  You may choose more than 1 proxy  · Do not resuscitate (DNR) order:  A DNR order is used in case your heart stops beating or you stop breathing  It is a request not to have certain forms of treatment, such as CPR  A DNR order may be included in other types of advance directives  · Medical directive: This covers the care that you want if you are in a coma, near death, or unable to make decisions for yourself  You can list the treatments you want for each condition  Treatment may include pain medicine, surgery, blood transfusions, dialysis, IV or tube feedings, and a ventilator (breathing machine)  · Values history: This document has questions about your views, beliefs, and how you feel and think about life  This information can help others choose the care that you would choose  Why are advance directives important? An advance directive helps you control your care  Although spoken wishes may be used, it is better to have your wishes written down  Spoken wishes can be misunderstood, or not followed  Treatments may be given even if you do not want them  An advance directive may make it easier for your family to make difficult choices about your care     Fall Prevention    Fall prevention  includes ways to make your home and other areas safer  It also includes ways you can move more carefully to prevent a fall  Health conditions that cause changes in your blood pressure, vision, or muscle strength and coordination may increase your risk for falls  Medicines may also increase your risk for falls if they make you dizzy, weak, or sleepy  Fall prevention tips:   · Stand or sit up slowly  · Use assistive devices as directed  · Wear shoes that fit well and have soles that   · Wear a personal alarm  · Stay active  · Manage your medical conditions  Home Safety Tips:  · Add items to prevent falls in the bathroom  · Keep paths clear  · Install bright lights in your home  · Keep items you use often on shelves within reach  · Paint or place reflective tape on the edges of your stairs  Urinary Incontinence   Urinary incontinence (UI)  is when you lose control of your bladder  UI develops because your bladder cannot store or empty urine properly  The 3 most common types of UI are stress incontinence, urge incontinence, or both  Medicines:   · May be given to help strengthen your bladder control  Report any side effects of medication to your healthcare provider  Do pelvic muscle exercises often:  Your pelvic muscles help you stop urinating  Squeeze these muscles tight for 5 seconds, then relax for 5 seconds  Gradually work up to squeezing for 10 seconds  Do 3 sets of 15 repetitions a day, or as directed  This will help strengthen your pelvic muscles and improve bladder control  Train your bladder:  Go to the bathroom at set times, such as every 2 hours, even if you do not feel the urge to go  You can also try to hold your urine when you feel the urge to go  For example, hold your urine for 5 minutes when you feel the urge to go  As that becomes easier, hold your urine for 10 minutes  Self-care:   · Keep a UI record    Write down how often you leak urine and how much you leak  Make a note of what you were doing when you leaked urine  · Drink liquids as directed  You may need to limit the amount of liquid you drink to help control your urine leakage  Do not drink any liquid right before you go to bed  Limit or do not have drinks that contain caffeine or alcohol  · Prevent constipation  Eat a variety of high-fiber foods  Good examples are high-fiber cereals, beans, vegetables, and whole-grain breads  Walking is the best way to trigger your intestines to have a bowel movement  · Exercise regularly and maintain a healthy weight  Weight loss and exercise will decrease pressure on your bladder and help you control your leakage  · Use a catheter as directed  to help empty your bladder  A catheter is a tiny, plastic tube that is put into your bladder to drain your urine  · Go to behavior therapy as directed  Behavior therapy may be used to help you learn to control your urge to urinate  Weight Management   Why it is important to manage your weight:  Being overweight increases your risk of health conditions such as heart disease, high blood pressure, type 2 diabetes, and certain types of cancer  It can also increase your risk for osteoarthritis, sleep apnea, and other respiratory problems  Aim for a slow, steady weight loss  Even a small amount of weight loss can lower your risk of health problems  How to lose weight safely:  A safe and healthy way to lose weight is to eat fewer calories and get regular exercise  You can lose up about 1 pound a week by decreasing the number of calories you eat by 500 calories each day  Healthy meal plan for weight management:  A healthy meal plan includes a variety of foods, contains fewer calories, and helps you stay healthy  A healthy meal plan includes the following:  · Eat whole-grain foods more often  A healthy meal plan should contain fiber   Fiber is the part of grains, fruits, and vegetables that is not broken down by your body  Whole-grain foods are healthy and provide extra fiber in your diet  Some examples of whole-grain foods are whole-wheat breads and pastas, oatmeal, brown rice, and bulgur  · Eat a variety of vegetables every day  Include dark, leafy greens such as spinach, kale, alyssa greens, and mustard greens  Eat yellow and orange vegetables such as carrots, sweet potatoes, and winter squash  · Eat a variety of fruits every day  Choose fresh or canned fruit (canned in its own juice or light syrup) instead of juice  Fruit juice has very little or no fiber  · Eat low-fat dairy foods  Drink fat-free (skim) milk or 1% milk  Eat fat-free yogurt and low-fat cottage cheese  Try low-fat cheeses such as mozzarella and other reduced-fat cheeses  · Choose meat and other protein foods that are low in fat  Choose beans or other legumes such as split peas or lentils  Choose fish, skinless poultry (chicken or turkey), or lean cuts of red meat (beef or pork)  Before you cook meat or poultry, cut off any visible fat  · Use less fat and oil  Try baking foods instead of frying them  Add less fat, such as margarine, sour cream, regular salad dressing and mayonnaise to foods  Eat fewer high-fat foods  Some examples of high-fat foods include french fries, doughnuts, ice cream, and cakes  · Eat fewer sweets  Limit foods and drinks that are high in sugar  This includes candy, cookies, regular soda, and sweetened drinks  Exercise:  Exercise at least 30 minutes per day on most days of the week  Some examples of exercise include walking, biking, dancing, and swimming  You can also fit in more physical activity by taking the stairs instead of the elevator or parking farther away from stores  Ask your healthcare provider about the best exercise plan for you  © Copyright [employer] 2018 Information is for End User's use only and may not be sold, redistributed or otherwise used for commercial purposes   All illustrations and images included in CareNotes® are the copyrighted property of [employer]  or [employer] Ambassador Magalis Cash:    1)Follow up with neprhology  2)Follow up with Urology for recurrent UTI's  3)Complete your mammogram, dexa scan  4)Podiatry for foot care and history of Diabetes  5)Would need colon cancer screening
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
## 2022-02-05 LAB
KAPPA LC FREE SER-MCNC: 30.8 MG/L (ref 3.3–19.4)
KAPPA LC FREE/LAMBDA FREE SER: 1.38 {RATIO} (ref 0.26–1.65)
LAMBDA LC FREE SERPL-MCNC: 22.4 MG/L (ref 5.7–26.3)

## 2022-02-06 PROBLEM — Z00.00 MEDICARE ANNUAL WELLNESS VISIT, SUBSEQUENT: Status: ACTIVE | Noted: 2022-02-06

## 2022-02-06 PROBLEM — F41.9 ANXIETY: Chronic | Status: ACTIVE | Noted: 2020-11-18

## 2022-02-06 NOTE — ASSESSMENT & PLAN NOTE
Lab Results   Component Value Date    EGFR 22 02/04/2022    EGFR 30 10/28/2021    EGFR 30 06/21/2021    CREATININE 2 07 (H) 02/04/2022    CREATININE 1 84 (H) 10/28/2021    CREATININE 1 85 (H) 06/21/2021   Avoid nephrotoxic agents  Medications to be renally dosed  Patient continues to follow with Nephrology

## 2022-02-06 NOTE — ASSESSMENT & PLAN NOTE
Patient continues to have behaviors given her history of severe dementia  Previously seen by Psychiatry and on multiple psychotropic agents  Patient maintained on trazodone, bupropion, citalopram, risperidone  Advised daughter to follow-up with Psychiatry given multiple psychotropic medications previously prescribed by Psychiatry  Daughter requesting an alternative provider with Psychiatry if she is to follow-up with them  Discussed with daughter also about placing a referral for home palliative care given severity of dementia with behaviors  No HI/SI  Reorientation redirection as needed  Encourage patient remain active mentally, physically and socially

## 2022-02-06 NOTE — ASSESSMENT & PLAN NOTE
Patient continues on physical therapy for gait training, balance and strengthening   Bed-bound for the most part  Maintain Falls precautions  Referral placed to palliative care

## 2022-02-06 NOTE — ASSESSMENT & PLAN NOTE
Patient maintained on Lipitor 40 mg daily  Recommend adherence to a diabetic, heart healthy diet  Will continue to monitor periodically

## 2022-02-06 NOTE — ASSESSMENT & PLAN NOTE
Patient with severe dementia with behaviors  Maintained on multiple psychotropic agents previously prescribed by Psychiatry  Continues on Aricept, Namenda  Mood also managed with risperidone, citalopram, bupropion, trazodone  Advised daughter to follow-up with Psychiatry  Also discussed with daughter about my recommendation to start palliative home care services    Daughter in agreement and referral placed to Estrellita jean  Patient continues to meet criteria for placement to higher level of care such as a nursing home facility or dementia unit  Continues to have daily home health aides for supervision as goal is to keep the patient at home  Daughter with significant caregiver burnout and have previously recommended her attending are caregiver support group or our therapists for supportive therapy  Reorientation and redirection as needed  Manage chronic conditions  Maintain Falls precautions  Encourage patient to remain active mentally, physically and socially  Participate in cognitively challenging exercises as able

## 2022-02-06 NOTE — ASSESSMENT & PLAN NOTE
Reviewed preventative screenings  Daughter requesting screenings to be placed at this time  Order for colonoscopy, DEXA scan, mammogram, referral to Podiatry placed  Follow-up with ophthalmology for routine eye exam  Reviewed immunizations  Realistically, relate to daughter, I would avoid any invasive procedures, however daughter requesting screenings to be placed which are needed  Discussed referring to palliative care for supportive therapy which daughter is in agreement with    Referral placed to Ashley jean  Discussed ensuring advanced directives in place which daughter will review with her brother, a

## 2022-02-06 NOTE — ASSESSMENT & PLAN NOTE
Dementia with behaviors  Patient previously seen by Psychiatry and prescribed Wellbutrin, risperidone, citalopram, trazodone  Advised daughter to schedule a follow-up appointment with Psychiatry  Will request MSW to assist with scheduling

## 2022-02-06 NOTE — ASSESSMENT & PLAN NOTE
Patient continues on amlodipine, hydralazine, losartan  Recommend adherence to a diabetic, low-sodium, heart healthy diet

## 2022-02-06 NOTE — ASSESSMENT & PLAN NOTE
Encourage increased fiber intake  Continue Gregory S  Physical activity as able  Will continue to monitor

## 2022-02-06 NOTE — ASSESSMENT & PLAN NOTE
Patient currently following with Orthopedics  Received steroid knee injections today  Maintain Falls precautions  Continue physical therapy

## 2022-02-06 NOTE — ASSESSMENT & PLAN NOTE
Lab Results   Component Value Date    HGBA1C 6 0 (H) 10/28/2021     Controlled  Continue linagliptin  Recommend adherence to a diabetic, heart healthy diet  Will continue to monitor periodically

## 2022-02-06 NOTE — ASSESSMENT & PLAN NOTE
Referral placed to Urology for recurrent UTIs  Advised on hygiene techniques given pt's severity of dementia

## 2022-02-07 ENCOUNTER — TELEPHONE (OUTPATIENT)
Dept: GERIATRICS | Age: 77
End: 2022-02-07

## 2022-02-07 NOTE — TELEPHONE ENCOUNTER
Per chart review, Margoth's daughter Edgardo Mckeon) did speak with Alexey Franks in Psychiatry in December and requested to see a geriatric psychiatrist  Mary Flores offered wait list, this was declined  Giovanna called again for an appointment  It was explained that she would have to complete a new intake and see new provider, Giovanna hung up      ----- Message from Andrew Lobo MD sent at 2/6/2022  4:58 PM EST -----  Can we pls enquire about follow up with this pt with psych   Daughter declines seeing prior psychiatrist and if possible, if we can get in with another psychiatrist  Referral wasn't placed as she was seen by their service already  Thanks

## 2022-02-07 NOTE — TELEPHONE ENCOUNTER
OLIVIER left voicemail for Reji Warner noting that in order for [facility] to see a new psychiatrist, Reji Warner would need to contact [address] [address] [address] at [phone] and have a new intake completed by phone  They would then be assisted with scheduling  With any questions for our office, I provided our office number, as well

## 2022-02-08 LAB
ALBUMIN SERPL ELPH-MCNC: 4.02 G/DL (ref 3.5–5)
ALBUMIN SERPL ELPH-MCNC: 57.4 % (ref 52–65)
ALPHA1 GLOB SERPL ELPH-MCNC: 0.46 G/DL (ref 0.1–0.4)
ALPHA1 GLOB SERPL ELPH-MCNC: 6.5 % (ref 2.5–5)
ALPHA2 GLOB SERPL ELPH-MCNC: 1.23 G/DL (ref 0.4–1.2)
ALPHA2 GLOB SERPL ELPH-MCNC: 17.6 % (ref 7–13)
BETA GLOB ABNORMAL SERPL ELPH-MCNC: 0.41 G/DL (ref 0.4–0.8)
BETA1 GLOB SERPL ELPH-MCNC: 5.8 % (ref 5–13)
BETA2 GLOB SERPL ELPH-MCNC: 5.6 % (ref 2–8)
BETA2+GAMMA GLOB SERPL ELPH-MCNC: 0.39 G/DL (ref 0.2–0.5)
GAMMA GLOB ABNORMAL SERPL ELPH-MCNC: 0.5 G/DL (ref 0.5–1.6)
GAMMA GLOB SERPL ELPH-MCNC: 7.1 % (ref 12–22)
IGG/ALB SER: 1.35 {RATIO} (ref 1.1–1.8)
PROT PATTERN SERPL ELPH-IMP: ABNORMAL
PROT SERPL-MCNC: 7 G/DL (ref 6.4–8.2)

## 2022-02-09 ENCOUNTER — APPOINTMENT (OUTPATIENT)
Dept: LAB | Facility: AMBULARY SURGERY CENTER | Age: 77
End: 2022-02-09
Payer: MEDICARE

## 2022-02-09 ENCOUNTER — TELEPHONE (OUTPATIENT)
Dept: GERIATRICS | Age: 77
End: 2022-02-09

## 2022-02-09 LAB
AMORPH PHOS CRY URNS QL MICRO: ABNORMAL /HPF
BACTERIA UR QL AUTO: ABNORMAL /HPF
BILIRUB UR QL STRIP: NEGATIVE
CLARITY UR: ABNORMAL
COLOR UR: YELLOW
CREAT UR-MCNC: 26.2 MG/DL
GLUCOSE UR STRIP-MCNC: NEGATIVE MG/DL
HGB UR QL STRIP.AUTO: ABNORMAL
KETONES UR STRIP-MCNC: NEGATIVE MG/DL
LEUKOCYTE ESTERASE UR QL STRIP: ABNORMAL
MICROALBUMIN UR-MCNC: 1170 MG/L (ref 0–20)
MICROALBUMIN/CREAT 24H UR: 4466 MG/G CREATININE (ref 0–30)
NITRITE UR QL STRIP: NEGATIVE
NON-SQ EPI CELLS URNS QL MICRO: ABNORMAL /HPF
PH UR STRIP.AUTO: 8.5 [PH]
PROT UR STRIP-MCNC: ABNORMAL MG/DL
RBC #/AREA URNS AUTO: ABNORMAL /HPF
SP GR UR STRIP.AUTO: 1.02 (ref 1–1.03)
TRI-PHOS CRY URNS QL MICRO: ABNORMAL /HPF
UROBILINOGEN UR QL STRIP.AUTO: 0.2 E.U./DL
WBC #/AREA URNS AUTO: ABNORMAL /HPF

## 2022-02-09 PROCEDURE — 87086 URINE CULTURE/COLONY COUNT: CPT | Performed by: STUDENT IN AN ORGANIZED HEALTH CARE EDUCATION/TRAINING PROGRAM

## 2022-02-09 PROCEDURE — 82043 UR ALBUMIN QUANTITATIVE: CPT

## 2022-02-09 PROCEDURE — 82570 ASSAY OF URINE CREATININE: CPT

## 2022-02-09 PROCEDURE — 81001 URINALYSIS AUTO W/SCOPE: CPT | Performed by: STUDENT IN AN ORGANIZED HEALTH CARE EDUCATION/TRAINING PROGRAM

## 2022-02-09 PROCEDURE — 87186 SC STD MICRODIL/AGAR DIL: CPT | Performed by: STUDENT IN AN ORGANIZED HEALTH CARE EDUCATION/TRAINING PROGRAM

## 2022-02-09 PROCEDURE — 87077 CULTURE AEROBIC IDENTIFY: CPT | Performed by: STUDENT IN AN ORGANIZED HEALTH CARE EDUCATION/TRAINING PROGRAM

## 2022-02-09 NOTE — TELEPHONE ENCOUNTER
MSW reached out to daughter to f/u with daughter's request for psychiatry  Daughter requested GIOVANNY to schedule a phone call   GIOVANNY will reach out to daughter tomorrow at 1pm

## 2022-02-09 NOTE — TELEPHONE ENCOUNTER
MSW reached out to daughter to f/u with daughter request for psychiatry for Pt  Daughter was at work and requested MSW to schedule a phone call   MSW will reach out to daughter tomorrow at 1pm

## 2022-02-10 ENCOUNTER — TELEPHONE (OUTPATIENT)
Dept: PSYCHIATRY | Facility: CLINIC | Age: 77
End: 2022-02-10

## 2022-02-10 NOTE — TELEPHONE ENCOUNTER
I spoke with Faith Patton regarding Pt wanting to be seen by a Dr  I told her of the wait list and that she would have to re-establish care because it has been over a year     She also said she was going to have the D[facility] put in a new referral

## 2022-02-10 NOTE — TELEPHONE ENCOUNTER
Spoke with Domi Heredia reports confusion as to why an intake needs to be re-done with psychiatry when Mary Blake was already seen by a psychiatrist  Logan Fatima noted that previous appointments were approximately one year ago and this is [address] [facility] policy/procedure  Giovanna requests that this information be sent via email rather than relayed by telephone  OLIVIER did also note that it was offered to her to be on a wait list in December  Giovanna notes that she was not offered this  LCSW noted plan to contact [address] [place] [address] to confirm whether they do have geriatric psychiatrists on staff and if so, what the wait time might be  Domi is in agreement with this plan  LCSW placed call and left voicemail with  [facility] as noted above  Provided pt name, , review of previous appt dates and phone call with psych in December  Inquired about [facility] psych providers and soonest availability/wait list  Provided direct line for return call  Information to be emailed to Domi once return call is received

## 2022-02-10 NOTE — TELEPHONE ENCOUNTER
Received return call from Kayley Crowder stating that there is a long wait list for adult psych which would be even longer if Ashish Lanza was requesting to see a geriatric psychiatrist  Only appointments would be available in [facility]  No specific wait time given  Intake would need to be completed as Ashish Lanza has not been seen since November 2020  Kayley Crowder suggests that Dr Carmela Blackwell place a referral to Psychiatry again  We also discussed that Margoth's daughter can consider exploring external psychiatrists for a sooner appointment

## 2022-02-10 NOTE — TELEPHONE ENCOUNTER
Placed call to [facility] at [phone]   states that Dr Victor Hugo Freire does not necessarily specialize in geriatrics, however, does see all patients  Wait list is about one month  He sees patients at the [facility] location Mondays and Fridays  He accepts Medicare and DOES NOT accept [address]

## 2022-02-11 ENCOUNTER — TELEPHONE (OUTPATIENT)
Dept: GERIATRICS | Age: 77
End: 2022-02-11

## 2022-02-11 DIAGNOSIS — N30.00 ACUTE CYSTITIS WITHOUT HEMATURIA: Primary | ICD-10-CM

## 2022-02-11 LAB
BACTERIA UR CULT: ABNORMAL
BACTERIA UR CULT: ABNORMAL

## 2022-02-11 RX ORDER — CEPHALEXIN 500 MG/1
500 CAPSULE ORAL EVERY 8 HOURS SCHEDULED
Qty: 21 CAPSULE | Refills: 0 | Status: SHIPPED | OUTPATIENT
Start: 2022-02-11 | End: 2022-02-11

## 2022-02-11 RX ORDER — CEPHALEXIN 500 MG/1
500 CAPSULE ORAL EVERY 12 HOURS SCHEDULED
Qty: 14 CAPSULE | Refills: 0 | Status: SHIPPED | OUTPATIENT
Start: 2022-02-11 | End: 2022-02-18

## 2022-02-11 NOTE — TELEPHONE ENCOUNTER
----- Message from Buck Diego MD sent at 2/11/2022  4:33 PM EST -----    Please call patient's daughter and let her know that urine culture was positive for E coli again  Will send in Keflex however given her recurrent UTIs, the patient should follow up with Urology to avoid antibiotic resistance in the future    Referral has been placed previously

## 2022-02-11 NOTE — TELEPHONE ENCOUNTER
Called daughter Roshan Ferguson and relayed Dr Malina Carson message and recommendation  Giovanna requested a call back next week with a telephone number for urology

## 2022-02-17 ENCOUNTER — TELEPHONE (OUTPATIENT)
Dept: GERIATRICS | Age: 77
End: 2022-02-17

## 2022-02-17 NOTE — TELEPHONE ENCOUNTER
Called Giovanna and followed up if she was able to contact Urology  She stated "yes through mychart and will respond to the message that they sent back"  I also relayed that Dr Dorcas Elder did give the okay for OT and PT and Nancy Bates is okay with [facility]  Abigail Sahu rehab form is placed in your mailbox for signature

## 2022-02-18 NOTE — TELEPHONE ENCOUNTER
Outgoing email sent to [email or web address]:    David Heredia,  Thank you for patience  I am emailing a few options for psychiatry services (below)  Please feel free to reach out to our office with any questions      1  [facility]  Phone: [phone] (new patient line)  - There are psychiatrists who specialize in the geriatric population, however, this would lengthen the wait time for an appointment   - Their office could not provide an exact wait time (I would estimate at least a few months)    2  [facility]  Phone: [phone]  - Dr Tristen Cordero does not specialize in geriatrics, however, does see all adult patients (he sees patients in the [facility] office on Mondays and Fridays)   - They do have a board-certified NP who has experience in long-term care settings  - Wait list is about one month  - This practice DOES NOT accept [address]; any amount not covered by Medicare would be the patient's responsibility    3   [facility]  Phone: [phone]  - Process would be to complete an intake appointment and then meet with a provider for medication management  - Availability could be April or sooner, virtual appointments are offered  - This practice DOES NOT accept [address]; any amount not covered by Medicare would be the patient's responsibility     Kj Fitzgerald  Licensed Clinical   [facility]  [address] [facility], [address]: [phone]  Direct line: [phone]  [facility]     [employer] 24/7 Helpline: [phone]

## 2022-02-20 DIAGNOSIS — G30.1 LATE ONSET ALZHEIMER'S DISEASE WITHOUT BEHAVIORAL DISTURBANCE (HCC): Primary | ICD-10-CM

## 2022-02-20 DIAGNOSIS — F02.80 LATE ONSET ALZHEIMER'S DISEASE WITHOUT BEHAVIORAL DISTURBANCE (HCC): Primary | ICD-10-CM

## 2022-02-20 DIAGNOSIS — F41.9 ANXIETY: Chronic | ICD-10-CM

## 2022-02-20 DIAGNOSIS — F33.1 MODERATE EPISODE OF RECURRENT MAJOR DEPRESSIVE DISORDER (HCC): ICD-10-CM

## 2022-03-08 ENCOUNTER — OFFICE VISIT (OUTPATIENT)
Dept: UROLOGY | Facility: CLINIC | Age: 77
End: 2022-03-08
Payer: MEDICARE

## 2022-03-08 VITALS
HEIGHT: 65 IN | BODY MASS INDEX: 28.79 KG/M2 | OXYGEN SATURATION: 97 % | DIASTOLIC BLOOD PRESSURE: 76 MMHG | HEART RATE: 96 BPM | RESPIRATION RATE: 16 BRPM | SYSTOLIC BLOOD PRESSURE: 136 MMHG

## 2022-03-08 DIAGNOSIS — N39.0 RECURRENT UTI: Primary | ICD-10-CM

## 2022-03-08 LAB
SL AMB  POCT GLUCOSE, UA: NORMAL
SL AMB LEUKOCYTE ESTERASE,UA: NORMAL
SL AMB POCT BILIRUBIN,UA: NORMAL
SL AMB POCT BLOOD,UA: NORMAL
SL AMB POCT CLARITY,UA: NORMAL
SL AMB POCT COLOR,UA: YELLOW
SL AMB POCT KETONES,UA: NORMAL
SL AMB POCT NITRITE,UA: NORMAL
SL AMB POCT PH,UA: 5
SL AMB POCT SPECIFIC GRAVITY,UA: 1.02
SL AMB POCT URINE PROTEIN: NORMAL
SL AMB POCT UROBILINOGEN: NORMAL

## 2022-03-08 PROCEDURE — 99213 OFFICE O/P EST LOW 20 MIN: CPT | Performed by: PHYSICIAN ASSISTANT

## 2022-03-08 PROCEDURE — 81002 URINALYSIS NONAUTO W/O SCOPE: CPT | Performed by: PHYSICIAN ASSISTANT

## 2022-03-08 RX ORDER — AMOXICILLIN AND CLAVULANATE POTASSIUM 500; 125 MG/1; MG/1
1 TABLET, FILM COATED ORAL EVERY 12 HOURS SCHEDULED
Qty: 10 TABLET | Refills: 4 | Status: SHIPPED | OUTPATIENT
Start: 2022-03-08 | End: 2022-03-13

## 2022-03-08 RX ORDER — MULTIVITAMIN WITH FOLIC ACID 400 MCG
1 TABLET ORAL DAILY
COMMUNITY
Start: 2022-01-27

## 2022-03-08 NOTE — PROGRESS NOTES
1  Recurrent UTI  POCT urine dip    amoxicillin-clavulanate (AUGMENTIN) 500-125 mg per tablet        Assessment and plan:       1  Recurrent urinary infections  - s/p normal cystoscopy and upper tract imaging  - self start augmentin  - f/u 6 months    Fran Webster PA-C      Chief Complaint     Recurrent urinary infections    History of Present Illness     Flori Avery is a 77 y o  female presenting today for recurrent urinary tract infections  Recurrent UTI - symptoms typically aggressive behavior that improves with antibiotics  Multiple positive cultures  No gross hematuria      Denies any history of  surgical manipulation that she is aware of  She does recall hysterectomy in her 35s for uterine prolapse  Unaware if any vaginal mesh was placed at that time  Underwent cystoscopy in the office 6/2021 which was negative for any evidence of  malignancy    Medical comorbidities include diabetes, Alzheimers  Urine dip leukocyte, nitrite, blood negative  Laboratory     Lab Results   Component Value Date    CREATININE 2 07 (H) 02/04/2022       Review of Systems     Review of Systems   Constitutional: Negative for activity change, appetite change, chills, diaphoresis, fatigue, fever and unexpected weight change  Respiratory: Negative for chest tightness and shortness of breath  Cardiovascular: Negative for chest pain, palpitations and leg swelling  Gastrointestinal: Negative for abdominal distention, abdominal pain, constipation, diarrhea, nausea and vomiting  Genitourinary: Negative for decreased urine volume, difficulty urinating, dysuria, enuresis, flank pain, frequency, genital sores, hematuria and urgency  Musculoskeletal: Negative for back pain, gait problem and myalgias  Skin: Negative for color change, pallor, rash and wound  Psychiatric/Behavioral: Negative for behavioral problems  The patient is not nervous/anxious                Allergies     Allergies   Allergen Reactions    Codeine        Physical Exam     Physical Exam  Constitutional:       General: She is not in acute distress  Appearance: Normal appearance  She is normal weight  She is not ill-appearing, toxic-appearing or diaphoretic  HENT:      Head: Normocephalic and atraumatic  Eyes:      General:         Right eye: No discharge  Left eye: No discharge  Conjunctiva/sclera: Conjunctivae normal    Pulmonary:      Effort: Pulmonary effort is normal  No respiratory distress  Musculoskeletal:      Comments: Ambulates with wheelchair assistance   Neurological:      Mental Status: She is alert  Comments: Alert  Periodically confused throughout exam   Psychiatric:         Mood and Affect: Mood normal          Behavior: Behavior normal          Thought Content:  Thought content normal          Judgment: Judgment normal            Vital Signs     Vitals:    03/08/22 1515   BP: 136/76   Pulse: 96   Resp: 16   SpO2: 97%   Height: 5' 5" (1 651 m)         Current Medications       Current Outpatient Medications:     amLODIPine (NORVASC) 10 mg tablet, TAKE 1 TABLET BY MOUTH EVERY DAY, Disp: 90 tablet, Rfl: 1    Ascorbic Acid (VITAMIN C) 100 MG tablet, Take 100 mg by mouth daily, Disp: , Rfl:     aspirin (ECOTRIN LOW STRENGTH) 81 mg EC tablet, Take 81 mg by mouth daily, Disp: , Rfl:     atorvastatin (LIPITOR) 40 mg tablet, Take 1 tablet (40 mg total) by mouth daily, Disp: 90 tablet, Rfl: 2    Azopt 1 % ophthalmic suspension, , Disp: , Rfl:     Brimonidine Tartrate-Timolol (COMBIGAN OP), Apply to eye, Disp: , Rfl:     buPROPion (WELLBUTRIN SR) 150 mg 12 hr tablet, Take 1 tablet (150 mg total) by mouth 2 (two) times a day, Disp: 180 tablet, Rfl: 2    Calcifediol ER (Rayaldee) 30 MCG CPCR, Take 30 mcg by mouth daily, Disp: 14 capsule, Rfl: 0    cetirizine (ZyrTEC) 10 mg tablet, Take 10 mg by mouth daily, Disp: , Rfl:     citalopram (CeleXA) 40 mg tablet, Take 1 tablet (40 mg total) by mouth daily, Disp: 90 tablet, Rfl: 2    diclofenac sodium (VOLTAREN) 1 %, Apply 2 g topically 3 (three) times a day as needed (Pain), Disp: 100 g, Rfl: 1    donepezil (ARICEPT) 10 mg tablet, Take 1 tablet (10 mg total) by mouth daily at bedtime, Disp: 180 tablet, Rfl: 0    dorzolamide (TRUSOPT) 2 % ophthalmic solution, 1 drop 3 (three) times a day, Disp: , Rfl:     ferrous sulfate 325 (65 Fe) mg tablet, Take 325 mg by mouth daily with breakfast, Disp: , Rfl:     fluticasone (FLONASE) 50 mcg/act nasal spray, INSTILL ONE (1) SPRAY IN EACH NOSTRIL DAILY, Disp: 16 g, Rfl: 2    furosemide (LASIX) 20 mg tablet, TAKE 1/2 TABLET BY MOUTH EVERY DAY, Disp: 45 tablet, Rfl: 1    hydrALAZINE (APRESOLINE) 25 mg tablet, TAKE 1 TABLET BY MOUTH THREE TIMES A DAY, Disp: 270 tablet, Rfl: 1    Incontinence Supply Disposable ([place] Fresh Moist Wipes) MISC, Use 6 (six) times a day, Disp: 140 each, Rfl: 5    Incontinence Supply Disposable (Depend Underwear Large/XL) MISC, Use 6 (six) times a day, Disp: 180 each, Rfl: 5    Latanoprostene Bunod (Vyzulta) 0 024 % SOLN, Apply to eye, Disp: , Rfl:     linaGLIPtin (Tradjenta) 5 MG TABS, Take 5 mg by mouth daily, Disp: 90 tablet, Rfl: 3    losartan (COZAAR) 25 mg tablet, Take 1 tablet (25 mg total) by mouth daily, Disp: 90 tablet, Rfl: 3    meclizine (ANTIVERT) 25 mg tablet, Take 1 tablet (25 mg total) by mouth 2 (two) times a day, Disp: 180 tablet, Rfl: 3    memantine (NAMENDA) 10 mg tablet, Take 1 tablet (10 mg total) by mouth 2 (two) times a day, Disp: 180 tablet, Rfl: 3    Multiple Vitamins-Minerals (Centrum Silver) tablet, Take 1 tablet by mouth daily, Disp: 90 tablet, Rfl: 3    omeprazole (PriLOSEC) 20 mg delayed release capsule, Take 20 mg by mouth daily, Disp: , Rfl:     polyethylene glycol (MIRALAX) 17 g packet, Take 17 g by mouth daily as needed (constipation), Disp: 1 each, Rfl: 3    risperiDONE (RisperDAL) 0 25 mg tablet, Take 1 tablet (0 25 mg total) by mouth 2 (two) times a day, Disp: 180 tablet, Rfl: 3    senna-docusate sodium (SENOKOT S) 8 6-50 mg per tablet, Take 1 tablet by mouth daily at bedtime, Disp: 90 tablet, Rfl: 3    traZODone (DESYREL) 50 mg tablet, Take 1 tablet (50 mg total) by mouth daily at bedtime, Disp: 90 tablet, Rfl: 3    amoxicillin-clavulanate (AUGMENTIN) 500-125 mg per tablet, Take 1 tablet by mouth every 12 (twelve) hours for 5 days, Disp: 10 tablet, Rfl: 4    Multiple Vitamin (Daily-Jr Multivitamin) TABS, Take 1 tablet by mouth daily, Disp: , Rfl:       Active Problems     Patient Active Problem List   Diagnosis    History of recurrent UTIs    Sleep difficulties    Late onset Alzheimer's disease without behavioral disturbance (HCC)    Moderate episode of recurrent major depressive disorder ([facility] [address] )    Vitamin D deficiency    Other hyperlipidemia    Type 2 diabetes mellitus with stage 3b chronic kidney disease and hypertension (HCC)    Bilateral primary osteoarthritis of knee    Polypharmacy    Anxiety    Gait instability    Vertigo    Essential hypertension    Slow transit constipation    Urine incontinence    Low bicarbonate level    Leg swelling    Vision impairment    CKD (chronic kidney disease)    Medicare annual wellness visit, subsequent         Past Medical History     Past Medical History:   Diagnosis Date    Acute cystitis without hematuria 8/13/2020    Allergic rhinitis 5/18/2021    Anxiety     Arm swelling 8/30/2021    Cholesterol depletion     Confusion     Confusion 5/17/2021    Dementia (HCC)     Depression     Diabetes ([facility] [address] [facility]     Early onset Alzheimer's dementia ([facility] [address] )     Encounter for screening colonoscopy 9/22/2020    Glaucoma     Hypertension     Hypertension     Memory loss     Right leg swelling 8/30/2021         Surgical History     Past Surgical History:   Procedure Laterality Date    CYSTOSCOPY  06/10/2021    HYSTERECTOMY           Family History     Family History   Problem Relation Age of Onset    Diabetes Mother     Hypertension Mother        Social History     Social History       Radiology

## 2022-03-14 DIAGNOSIS — J30.9 ALLERGIC RHINITIS, UNSPECIFIED SEASONALITY, UNSPECIFIED TRIGGER: ICD-10-CM

## 2022-03-15 ENCOUNTER — CONSULT (OUTPATIENT)
Dept: PAIN MEDICINE | Facility: CLINIC | Age: 77
End: 2022-03-15
Payer: MEDICARE

## 2022-03-15 VITALS
HEART RATE: 75 BPM | WEIGHT: 173 LBS | DIASTOLIC BLOOD PRESSURE: 74 MMHG | SYSTOLIC BLOOD PRESSURE: 124 MMHG | BODY MASS INDEX: 28.79 KG/M2

## 2022-03-15 DIAGNOSIS — F33.1 MODERATE EPISODE OF RECURRENT MAJOR DEPRESSIVE DISORDER (HCC): ICD-10-CM

## 2022-03-15 DIAGNOSIS — M17.0 BILATERAL PRIMARY OSTEOARTHRITIS OF KNEE: Primary | ICD-10-CM

## 2022-03-15 DIAGNOSIS — I12.9 TYPE 2 DIABETES MELLITUS WITH STAGE 3B CHRONIC KIDNEY DISEASE AND HYPERTENSION (HCC): ICD-10-CM

## 2022-03-15 DIAGNOSIS — R26.81 GAIT INSTABILITY: ICD-10-CM

## 2022-03-15 DIAGNOSIS — N18.32 TYPE 2 DIABETES MELLITUS WITH STAGE 3B CHRONIC KIDNEY DISEASE AND HYPERTENSION (HCC): ICD-10-CM

## 2022-03-15 DIAGNOSIS — E11.22 TYPE 2 DIABETES MELLITUS WITH STAGE 3B CHRONIC KIDNEY DISEASE AND HYPERTENSION (HCC): ICD-10-CM

## 2022-03-15 PROCEDURE — 99204 OFFICE O/P NEW MOD 45 MIN: CPT | Performed by: PHYSICAL MEDICINE & REHABILITATION

## 2022-03-15 RX ORDER — FLUTICASONE PROPIONATE 50 MCG
SPRAY, SUSPENSION (ML) NASAL
Qty: 16 G | Refills: 2 | Status: SHIPPED | OUTPATIENT
Start: 2022-03-15

## 2022-03-15 NOTE — PATIENT INSTRUCTIONS
Chronic Pain   WHAT YOU NEED TO KNOW:   Chronic pain is pain that does not get better for 3 months or longer  Chronic pain may hurt all the time, or come and go  DISCHARGE INSTRUCTIONS:   Call your local emergency number or have someone else call (911 in the [address]) if:   · You are breathing slower than normal, or you have trouble breathing  · You cannot be awakened  · You have a seizure  Call your doctor if:   · Your heart feels like it is jumping or fluttering  · You cannot think clearly  · You have side effects from prescription pain medicine, such as itching, nausea, or vomiting  · You have trouble sleeping  · Your pain gets worse, even after you take medicine  · You don't think the medicine is working  · You have questions or concerns about your condition or care  Medicines: You may need any of the following:  · Acetaminophen  decreases pain and fever  It is available without a doctor's order  Ask how much to take and how often to take it  Follow directions  Read the labels of all other medicines you are using to see if they also contain acetaminophen, or ask your doctor or pharmacist  Acetaminophen can cause liver damage if not taken correctly  Do not use more than 4 grams (4,000 milligrams) total of acetaminophen in one day  · NSAIDs , such as ibuprofen, help decrease swelling, pain, and fever  This medicine is available with or without a doctor's order  NSAIDs can cause stomach bleeding or kidney problems in certain people  If you take blood thinner medicine, always ask your healthcare provider if NSAIDs are safe for you  Always read the medicine label and follow directions  · Prescription pain medicine  called narcotics or opioids may be given for certain types of chronic pain  Ask your healthcare provider how to take this medicine safely  · Anesthetics  can be rubbed on your skin or injected into a nerve or muscle to numb an area      · Other medicines  may reduce pain, anxiety, muscle tension, or swelling  · Take your medicine as directed  Contact your healthcare provider if you think your medicine is not helping or if you have side effects  Tell him of her if you are allergic to any medicine  Keep a list of the medicines, vitamins, and herbs you take  Include the amounts, and when and why you take them  Bring the list or the pill bottles to follow-up visits  Carry your medicine list with you in case of an emergency  Manage your chronic pain:   · Apply heat  on the area in pain for 20 to 30 minutes every 2 hours for as many days as directed  Heat helps decrease pain and muscle spasms  · Apply ice  on the part of your body that hurts for 15 to 20 minutes every hour or as directed  Use an ice pack, or put crushed ice in a plastic bag  Cover it with a towel  Ice decreases pain and swelling, and helps prevent tissue damage  · Go to physical therapy as directed  A physical therapist teaches you exercises to help improve movement and strength, and to decrease pain  · Exercise for 30 minutes, 3 times a week  Regular physical activity can help decrease pain and improve your quality of life  Ask your healthcare provider about the best exercise plan for your type of pain  · Get enough sleep  Create a relaxing bedtime routine  Go to sleep and wake up at the same time every day  Avoid caffeine in the afternoon  · Talk with a counselor or therapist   A type of counseling called cognitive behavioral therapy (CBT) can help your chronic pain by changing the way you think about it  CBT can also improve your mood, sleep, and ability to move  What you must know if you take narcotic pain medicine:   · You may need to take a bowel movement softener  The most common side effect of prescription pain medicine is constipation  Bowel movement softeners are available over the counter  · Do not mix prescription pain medicines    This can cause an overdose of medicine, which can become life-threatening  Read labels  Make sure you know the ingredients in all of your medicines  · Do not drink alcohol  when you take prescription pain medicine  It is not safe to mix narcotics or opioids with alcohol or illegal drugs  · Prescription pain medicine may impair your ability to drive or work safely  They may also cause dizziness and increase your risk for falling  · Store prescription pain medicine in a safe location at home  Keep your medicine away from children and other people  Never share your medicine with anyone  Follow up with your healthcare provider as directed: You may be referred to a pain specialist  Write down your questions so you remember to ask them during your visits  © Copyright [employer] 2022 Information is for End User's use only and may not be sold, redistributed or otherwise used for commercial purposes  All illustrations and images included in CareNotes® are the copyrighted property of [employer]  or Jody Rosa   The above information is an  only  It is not intended as medical advice for individual conditions or treatments  Talk to your doctor, nurse or pharmacist before following any medical regimen to see if it is safe and effective for you

## 2022-03-15 NOTE — PROGRESS NOTES
Assessment  1  Bilateral primary osteoarthritis of knee    2  Gait instability    3  Moderate episode of recurrent major depressive disorder ([facility] [address] )    4  Type 2 diabetes mellitus with stage 3b chronic kidney disease and hypertension ([facility] [address] )        Plan  Ms Andrade Alamo is a pleasant 78-year-old female who presents for initial evaluation regarding bilateral knee pain and diagnostic imaging demonstrates bilateral knee osteoarthritic changes unrelieved with conservative measures including physical therapy, home exercises and steroid injections  At this time interventional approaches including genicular nerve blocks under fluoro guidance would likely be beneficial and warranted  At this time we will plan for a right genicular nerve block under fluoro guidance with plans to proceed with radiofrequency ablation of patient obtains adequate and significant pain relief  All questions answered, patient is agreeable with plan  Complete risks and benefits including bleeding, infection, tissue reaction, nerve injury and allergic reaction were discussed  The approach was demonstrated using models and literature was provided  Verbal and written consent was obtained  My impressions and treatment recommendations were discussed in detail with the patient who verbalized understanding and had no further questions  Discharge instructions were provided  I personally saw and examined the patient and I agree with the above discussed plan of care  Orders Placed This Encounter   Procedures    FL spine and pain procedure     Standing Status:   Future     Standing Expiration Date:   3/15/2026     Order Specific Question:   Reason for Exam:     Answer:   Right genicular nerve blocks     Order Specific Question:   Anticoagulant hold needed? Answer:   No     No orders of the defined types were placed in this encounter        History of Present Illness    Cici Ayon is a 77 y o  female presents to [place] [address] [facility] for initial evaluation regarding bilateral knee pain that is significantly impacted her quality of life and activities of daily living  Patient's daughter at bedside reports progressively worsening bilateral knee pain and has been referred by Dr Dony Martinez regarding bilateral knee OA  Patient currently reports 10 years duration of bilateral knee pain that is described as a severe 10/10 constant sharp, throbbing, aching sensation that is present throughout the day and night  Also reports lower extremity weakness and requires a wheelchair for ambulation  Symptoms are unchanged with position  Has had no significant relief with physical therapy, home exercises  Not currently taking anything for pain  Presents today for initial evaluation  I have personally reviewed and/or updated the patient's past medical history, past surgical history, family history, social history, current medications, allergies, and vital signs today  Review of Systems   Constitutional: Negative for fever and unexpected weight change  HENT: Negative for trouble swallowing  Eyes: Negative for visual disturbance  Respiratory: Negative for shortness of breath and wheezing  Cardiovascular: Negative for chest pain and palpitations  Gastrointestinal: Negative for constipation, diarrhea, nausea and vomiting  Endocrine: Negative for cold intolerance, heat intolerance and polydipsia  Genitourinary: Negative for difficulty urinating and frequency  Musculoskeletal: Positive for back pain, gait problem and joint swelling  Negative for arthralgias and myalgias  Skin: Negative for rash  Neurological: Negative for dizziness, seizures, syncope, weakness and headaches  Hematological: Does not bruise/bleed easily  Psychiatric/Behavioral: Negative for dysphoric mood  All other systems reviewed and are negative        Patient Active Problem List   Diagnosis    History of recurrent UTIs    Sleep difficulties    Late onset Alzheimer's disease without behavioral disturbance ([place] [address] )    Moderate episode of recurrent major depressive disorder ([place] [address] )    Vitamin D deficiency    Other hyperlipidemia    Type 2 diabetes mellitus with stage 3b chronic kidney disease and hypertension ([place] [address] )    Bilateral primary osteoarthritis of knee    Polypharmacy    Anxiety    Gait instability    Vertigo    Essential hypertension    Slow transit constipation    Urine incontinence    Low bicarbonate level    Leg swelling    Vision impairment    CKD (chronic kidney disease)    Medicare annual wellness visit, subsequent       Past Medical History:   Diagnosis Date    Acute cystitis without hematuria 8/13/2020    Allergic rhinitis 5/18/2021    Anxiety     Arm swelling 8/30/2021    Cholesterol depletion     Confusion     Confusion 5/17/2021    Dementia ([place] [address] )     Depression     Diabetes ([place] [address] )     Early onset Alzheimer's dementia ([place] [address] )     Encounter for screening colonoscopy 9/22/2020    Glaucoma     Hypertension     Hypertension     Memory loss     Right leg swelling 8/30/2021       Past Surgical History:   Procedure Laterality Date    CYSTOSCOPY  06/10/2021    HYSTERECTOMY         Family History   Problem Relation Age of Onset    Diabetes Mother     Hypertension Mother        Social History     Occupational History    Occupation: retired   Tobacco Use    Smoking status: Former Smoker     Types: Cigarettes     Quit date: 7/30/2018     Years since quitting: 3 6    Smokeless tobacco: Never Used   Vaping Use    Vaping Use: Never used   Substance and Sexual Activity    Alcohol use: Not Currently    Drug use: Never    Sexual activity: Not Currently       Current Outpatient Medications on File Prior to Visit   Medication Sig    amLODIPine (NORVASC) 10 mg tablet TAKE 1 TABLET BY MOUTH EVERY DAY    Ascorbic Acid (VITAMIN C) 100 MG tablet Take 100 mg by mouth daily    aspirin (ECOTRIN LOW STRENGTH) 81 mg EC tablet Take 81 mg by mouth daily    atorvastatin (LIPITOR) 40 mg tablet Take 1 tablet (40 mg total) by mouth daily    Azopt 1 % ophthalmic suspension     Brimonidine Tartrate-Timolol (COMBIGAN OP) Apply to eye    buPROPion (WELLBUTRIN SR) 150 mg 12 hr tablet Take 1 tablet (150 mg total) by mouth 2 (two) times a day    Calcifediol ER (Rayaldee) 30 MCG CPCR Take 30 mcg by mouth daily    cetirizine (ZyrTEC) 10 mg tablet Take 10 mg by mouth daily    citalopram (CeleXA) 40 mg tablet Take 1 tablet (40 mg total) by mouth daily    diclofenac sodium (VOLTAREN) 1 % Apply 2 g topically 3 (three) times a day as needed (Pain)    donepezil (ARICEPT) 10 mg tablet Take 1 tablet (10 mg total) by mouth daily at bedtime    dorzolamide (TRUSOPT) 2 % ophthalmic solution 1 drop 3 (three) times a day    ferrous sulfate 325 (65 Fe) mg tablet Take 325 mg by mouth daily with breakfast    fluticasone (FLONASE) 50 mcg/act nasal spray INSTILL ONE (1) SPRAY IN EACH NOSTRIL DAILY    furosemide (LASIX) 20 mg tablet TAKE 1/2 TABLET BY MOUTH EVERY DAY    hydrALAZINE (APRESOLINE) 25 mg tablet TAKE 1 TABLET BY MOUTH THREE TIMES A DAY    Incontinence Supply Disposable ([place] Fresh Moist Wipes) MISC Use 6 (six) times a day    Incontinence Supply Disposable (Depend Underwear Large/XL) MISC Use 6 (six) times a day    Latanoprostene Bunod (Vyzulta) 0 024 % SOLN Apply to eye    linaGLIPtin (Tradjenta) 5 MG TABS Take 5 mg by mouth daily    losartan (COZAAR) 25 mg tablet Take 1 tablet (25 mg total) by mouth daily    meclizine (ANTIVERT) 25 mg tablet Take 1 tablet (25 mg total) by mouth 2 (two) times a day    memantine (NAMENDA) 10 mg tablet Take 1 tablet (10 mg total) by mouth 2 (two) times a day    Multiple Vitamin (Daily-Jr Multivitamin) TABS Take 1 tablet by mouth daily    Multiple Vitamins-Minerals (Centrum Silver) tablet Take 1 tablet by mouth daily    omeprazole (PriLOSEC) 20 mg delayed release capsule Take 20 mg by mouth daily    polyethylene glycol (MIRALAX) 17 g packet Take 17 g by mouth daily as needed (constipation)    risperiDONE (RisperDAL) 0 25 mg tablet Take 1 tablet (0 25 mg total) by mouth 2 (two) times a day    senna-docusate sodium (SENOKOT S) 8 6-50 mg per tablet Take 1 tablet by mouth daily at bedtime    traZODone (DESYREL) 50 mg tablet Take 1 tablet (50 mg total) by mouth daily at bedtime    [DISCONTINUED] fluticasone (FLONASE) 50 mcg/act nasal spray INSTILL ONE (1) SPRAY IN EACH NOSTRIL DAILY     No current facility-administered medications on file prior to visit  Allergies   Allergen Reactions    Codeine        Physical Exam    /74   Pulse 75   Wt 78 5 kg (173 lb)   BMI 28 79 kg/m²     Constitutional: normal, well developed, well nourished, alert, in no distress and non-toxic and no overt pain behavior    Eyes: anicteric  HEENT: grossly intact  Neck: supple, symmetric, trachea midline and no masses   Pulmonary:even and unlabored  Cardiovascular:No edema or pitting edema present  Skin:Normal without rashes or lesions and well hydrated  Psychiatric:Mood and affect appropriate  Neurologic:Cranial Nerves II-XII grossly intact  Musculoskeletal:antalgic and in wheelchair, tenderness to palpation medial lateral joint line bilateral knees, visible edema right knee, decreased active and passive range of motion with full flexion and extension right knee limited by pain, antigravity bilateral upper extremities, DTRs hyporeflexic bilateral patellar and Achilles reflexes    Imaging

## 2022-03-16 ENCOUNTER — OFFICE VISIT (OUTPATIENT)
Dept: PODIATRY | Facility: CLINIC | Age: 77
End: 2022-03-16
Payer: MEDICARE

## 2022-03-16 VITALS — SYSTOLIC BLOOD PRESSURE: 147 MMHG | HEART RATE: 73 BPM | DIASTOLIC BLOOD PRESSURE: 79 MMHG

## 2022-03-16 DIAGNOSIS — E11.9 ENCOUNTER FOR DIABETIC FOOT EXAM (HCC): ICD-10-CM

## 2022-03-16 DIAGNOSIS — N18.32 TYPE 2 DIABETES MELLITUS WITH STAGE 3B CHRONIC KIDNEY DISEASE AND HYPERTENSION (HCC): ICD-10-CM

## 2022-03-16 DIAGNOSIS — E11.22 TYPE 2 DIABETES MELLITUS WITH STAGE 3B CHRONIC KIDNEY DISEASE AND HYPERTENSION (HCC): ICD-10-CM

## 2022-03-16 DIAGNOSIS — I12.9 TYPE 2 DIABETES MELLITUS WITH STAGE 3B CHRONIC KIDNEY DISEASE AND HYPERTENSION (HCC): ICD-10-CM

## 2022-03-16 PROCEDURE — 99203 OFFICE O/P NEW LOW 30 MIN: CPT | Performed by: PODIATRIST

## 2022-03-17 NOTE — PATIENT INSTRUCTIONS
Foot Care for People with Diabetes   WHAT YOU NEED TO KNOW:   · Foot care helps protect your feet and prevent foot ulcers or sores  Long-term high blood sugar levels can damage the blood vessels and nerves in your legs and feet  This damage makes it hard to feel pressure, pain, temperature, and touch  You may not be able to feel a cut or sore, or shoes that are too tight  Foot care is needed to prevent serious problems, such as an infection or amputation  · Diabetes may cause your toes to become crooked or curved under  These changes may affect the way you walk and can lead to increased pressure on your foot  The pressure can decrease blood flow to your feet  Lack of blood flow increases your risk for a foot ulcer  Do not ignore small problems, such as dry skin or small wounds  These can become life-threatening over time without proper care  DISCHARGE INSTRUCTIONS:   Call your care team provider if:   · Your feet become numb, weak, or hard to move  · You have pus draining from a sore on your foot  · You have a wound on your foot that gets bigger, deeper, or does not heal      · You see blisters, cuts, scratches, calluses, or sores on your foot  · You have a fever, and your feet become red, warm, and swollen  · Your toenails become thick, curled, or yellow  · You find it hard to check your feet because your vision is poor  · You have questions or concerns about your condition or care  Foot care:   · Check your feet each day  Look at your whole foot, including the bottom, and between and under your toes  Check for wounds, corns, and calluses  Use a mirror to see the bottom of your feet  The skin on your feet may be shiny, tight, or darker than normal  Your feet may also be cold and pale  Feel your feet by running your hands along the tops, bottoms, sides, and between your toes  Redness, swelling, and warmth are signs of blood flow problems that can lead to a foot ulcer   Do not try to remove corns or calluses yourself  · Wash your feet each day with soap and warm water  Do not use hot water, because this can injure your foot  Dry your feet gently with a towel after you wash them  Dry between and under your toes  · Apply lotion or a moisturizer on your dry feet  Ask your care team provider what lotions are best to use  Do not put lotion or moisturizer between your toes  Moisture between your toes could lead to skin breakdown  · Cut your toenails correctly  File or cut your toenails straight across  Use a soft brush to clean around your toenails  If your toenails are very thick, you may need to have a care team provider or specialist cut them  · Protect your feet  Do not walk barefoot or wear your shoes without socks  Check your shoes for rocks or other objects that can hurt your feet  Wear cotton socks to help keep your feet dry  Wear socks without toe seams, or wear them with the seams inside out  Change your socks each day  Do not wear socks that are dirty or damp  · Wear shoes that fit well  Wear shoes that do not rub against any area of your feet  Your shoes should be ½ to ¾ inch (1 to 2 centimeters) longer than your feet  Your shoes should also have extra space around the widest part of your feet  Walking or athletic shoes with laces or straps that adjust are best  Ask your care team provider for help to choose shoes that fit you best  Ask him or her if you need to wear an insert, orthotic, or bandage on your feet  · Do not smoke  Smoking can damage your blood vessels and put you at increased risk for foot ulcers  Ask your care team provider for information if you currently smoke and need help to quit  E-cigarettes or smokeless tobacco still contain nicotine  Talk to your care team provider before you use these products  Follow up with your diabetes care team provider or foot specialist as directed:   You will need to have your feet checked at least once a pat Aguilera Saas may need a foot exam more often if you have nerve damage, foot deformities, or ulcers  Write down your questions so you remember to ask them during your visits  © Copyright [employer] 2022 Information is for End User's use only and may not be sold, redistributed or otherwise used for commercial purposes  All illustrations and images included in CareNotes® are the copyrighted property of A D A M , Inc  or [facility]   The above information is an  only  It is not intended as medical advice for individual conditions or treatments  Talk to your doctor, nurse or pharmacist before following any medical regimen to see if it is safe and effective for you

## 2022-03-17 NOTE — PROGRESS NOTES
This patient was seen on 3/16/22  My role is Foot , Ankle, and Wound Specialist    SUBJECTIVE    Chief Complaint:  Diabetic foot check     Patient ID: Lakia Israel is a 77 y o  female  Kayode Pizarro is here for the first time for an annual diabetic foot exam  She denies ever having a foot ulcer  She doesn't walk much except a few steps with assistance  Her daughter is present as is her full time nurse caregiver  The following portions of the patient's history were reviewed and updated as appropriate: allergies, current medications, past family history, past medical history, past social history, past surgical history and problem list     Review of Systems   Constitutional: Negative  Respiratory: Negative  Musculoskeletal: Positive for gait problem  Psychiatric/Behavioral: Positive for decreased concentration  OBJECTIVE      /79   Pulse 73     Foot/Ankle Musculoskeletal Exam    General      Neurological: alert    Inspection      Inspection - Right        Right foot/ankle inspection is normal        Inspection - Left        Left foot/ankle inspection is normal       Neurovascular      Neurovascular - Right        Dorsalis pedis: 2+      Posterior tibial: 1+      Neurovascular - Left        Dorsalis pedis: 2+      Posterior tibial: 1+       Physical Exam  Vitals and nursing note reviewed  Constitutional:       General: She is not in acute distress  Appearance: Normal appearance  She is not ill-appearing, toxic-appearing or diaphoretic  HENT:      Head: Normocephalic and atraumatic  Cardiovascular:      Pulses: no weak pulses          Dorsalis pedis pulses are 2+ on the right side and 2+ on the left side  Posterior tibial pulses are 1+ on the right side and 1+ on the left side  Pulmonary:      Effort: Pulmonary effort is normal       Breath sounds: Normal breath sounds     Feet:      Right foot:      Skin integrity: No ulcer, skin breakdown, erythema, warmth, callus or dry skin       Left foot:      Skin integrity: No ulcer, skin breakdown, erythema, warmth, callus or dry skin  Neurological:      Mental Status: She is alert  Diabetic Foot Exam    Patient's shoes and socks removed  Right Foot/Ankle   Right Foot Inspection  Skin Exam: skin normal and skin intact  No dry skin, no warmth, no callus, no erythema, no maceration, no abnormal color, no pre-ulcer, no ulcer and no callus  Toe Exam: ROM and strength within normal limits  Sensory   Vibration: diminished  Proprioception: diminished  Monofilament testing: diminished    Vascular  Capillary refills: < 3 seconds  The right DP pulse is 2+  The right PT pulse is 1+  Left Foot/Ankle  Left Foot Inspection  Skin Exam: skin normal and skin intact  No dry skin, no warmth, no erythema, no maceration, normal color, no pre-ulcer, no ulcer and no callus  Toe Exam: ROM and strength within normal limits  Sensory   Vibration: diminished  Proprioception: diminished  Monofilament testing: diminished    Vascular  Capillary refills: < 3 seconds  The left DP pulse is 2+  The left PT pulse is 1+  Assign Risk Category  No deformity present  Loss of protective sensation  No weak pulses  Risk: 1      ASSESSMENT     Diagnoses and all orders for this visit:    Type 2 diabetes mellitus with stage 3b chronic kidney disease and hypertension ([place] [address] )  -     Ambulatory referral to Podiatry    Encounter for diabetic foot exam ([place] [address] )  -     Ambulatory referral to Podiatry         Problem List Items Addressed This Visit        Endocrine    Type 2 diabetes mellitus with stage 3b chronic kidney disease and hypertension ([facility] [facility] )      Other Visit Diagnoses     Encounter for diabetic foot exam ([place] [address] )                  PLAN    I note moderate risk due to the neuropathy      DM foot precautions reviewed with patient including the need to wear protective shoegear at all times when walking including in the home, the need to check feet all surfaces daily with a mirror and report and skin breaks to podiatry, the need to apply an emollient to skin of feet daily  She will see me annually for a risk assessment

## 2022-03-21 ENCOUNTER — HOSPITAL ENCOUNTER (OUTPATIENT)
Dept: RADIOLOGY | Facility: CLINIC | Age: 77
Discharge: HOME | End: 2022-03-21
Attending: PHYSICAL MEDICINE & REHABILITATION
Payer: MEDICARE

## 2022-03-21 VITALS
HEART RATE: 81 BPM | TEMPERATURE: 98.6 F | DIASTOLIC BLOOD PRESSURE: 75 MMHG | RESPIRATION RATE: 18 BRPM | OXYGEN SATURATION: 93 % | SYSTOLIC BLOOD PRESSURE: 168 MMHG

## 2022-03-21 DIAGNOSIS — M17.0 BILATERAL PRIMARY OSTEOARTHRITIS OF KNEE: ICD-10-CM

## 2022-03-21 DIAGNOSIS — R26.81 GAIT INSTABILITY: ICD-10-CM

## 2022-03-21 PROCEDURE — 64454 NJX AA&/STRD GNCLR NRV BRNCH: CPT | Performed by: PHYSICAL MEDICINE & REHABILITATION

## 2022-03-21 RX ORDER — 0.9 % SODIUM CHLORIDE 0.9 %
10 VIAL (ML) INJECTION ONCE
Status: COMPLETED | OUTPATIENT
Start: 2022-03-21 | End: 2022-03-21

## 2022-03-21 RX ORDER — BUPIVACAINE HCL/PF 2.5 MG/ML
10 VIAL (ML) INJECTION ONCE
Status: COMPLETED | OUTPATIENT
Start: 2022-03-21 | End: 2022-03-21

## 2022-03-21 RX ADMIN — BUPIVACAINE HYDROCHLORIDE 3 ML: 2.5 INJECTION, SOLUTION EPIDURAL; INFILTRATION; INTRACAUDAL at 08:30

## 2022-03-21 RX ADMIN — SODIUM CHLORIDE 2 ML: 9 INJECTION, SOLUTION INTRAMUSCULAR; INTRAVENOUS; SUBCUTANEOUS at 08:28

## 2022-03-21 RX ADMIN — Medication 2 ML: at 08:28

## 2022-03-21 NOTE — H&P
History of Present Illness:  The patient is a 77 y o  female who presents with complaints of knee pain    Patient Active Problem List   Diagnosis    History of recurrent UTIs    Sleep difficulties    [facility] onset [facility]    Moderate episode of recurrent major depressive disorder ([facility] )    Vitamin D deficiency    Other hyperlipidemia    Type 2 diabetes mellitus with stage 3b chronic kidney disease and hypertension ([facility] )    Bilateral primary osteoarthritis of knee    Polypharmacy    Anxiety    Gait instability    Vertigo    Essential hypertension    Slow transit constipation    Urine incontinence    Low bicarbonate level    Leg swelling    Vision impairment    CKD (chronic kidney disease)    Medicare annual wellness visit, subsequent       Past Medical History:   Diagnosis Date    Acute cystitis without hematuria 8/13/2020    Allergic rhinitis 5/18/2021    Anxiety     Arm swelling 8/30/2021    Cholesterol depletion     Confusion     Confusion 5/17/2021    Dementia ([facility] )     Depression     Diabetes ([facility]     Early onset Alzheimer's dementia ([facility] )     Encounter for screening colonoscopy 9/22/2020    Glaucoma     Hypertension     Hypertension     Memory loss     Right leg swelling 8/30/2021       Past Surgical History:   Procedure Laterality Date    CYSTOSCOPY  06/10/2021    HYSTERECTOMY           Current Outpatient Medications:     amLODIPine (NORVASC) 10 mg tablet, TAKE 1 TABLET BY MOUTH EVERY DAY, Disp: 90 tablet, Rfl: 1    Ascorbic Acid (VITAMIN C) 100 MG tablet, Take 100 mg by mouth daily, Disp: , Rfl:     aspirin (ECOTRIN LOW STRENGTH) 81 mg EC tablet, Take 81 mg by mouth daily, Disp: , Rfl:     atorvastatin (LIPITOR) 40 mg tablet, Take 1 tablet (40 mg total) by mouth daily, Disp: 90 tablet, Rfl: 2    Azopt 1 % ophthalmic suspension, , Disp: , Rfl:     Brimonidine Tartrate-Timolol (COMBIGAN OP), Apply to eye, Disp: , Rfl:    buPROPion (WELLBUTRIN SR) 150 mg 12 hr tablet, Take 1 tablet (150 mg total) by mouth 2 (two) times a day, Disp: 180 tablet, Rfl: 2    Calcifediol ER (Rayaldee) 30 MCG CPCR, Take 30 mcg by mouth daily, Disp: 14 capsule, Rfl: 0    cetirizine (ZyrTEC) 10 mg tablet, Take 10 mg by mouth daily, Disp: , Rfl:     citalopram (CeleXA) 40 mg tablet, Take 1 tablet (40 mg total) by mouth daily, Disp: 90 tablet, Rfl: 2    diclofenac sodium (VOLTAREN) 1 %, Apply 2 g topically 3 (three) times a day as needed (Pain), Disp: 100 g, Rfl: 1    donepezil (ARICEPT) 10 mg tablet, Take 1 tablet (10 mg total) by mouth daily at bedtime, Disp: 180 tablet, Rfl: 0    dorzolamide (TRUSOPT) 2 % ophthalmic solution, 1 drop 3 (three) times a day, Disp: , Rfl:     ferrous sulfate 325 (65 Fe) mg tablet, Take 325 mg by mouth daily with breakfast, Disp: , Rfl:     fluticasone (FLONASE) 50 mcg/act nasal spray, INSTILL ONE (1) SPRAY IN EACH NOSTRIL DAILY, Disp: 16 g, Rfl: 2    furosemide (LASIX) 20 mg tablet, TAKE 1/2 TABLET BY MOUTH EVERY DAY, Disp: 45 tablet, Rfl: 1    hydrALAZINE (APRESOLINE) 25 mg tablet, TAKE 1 TABLET BY MOUTH THREE TIMES A DAY, Disp: 270 tablet, Rfl: 1    Incontinence Supply Disposable ([place] Fresh Moist Wipes) MISC, Use 6 (six) times a day, Disp: 140 each, Rfl: 5    Incontinence Supply Disposable (Depend Underwear Large/XL) MISC, Use 6 (six) times a day, Disp: 180 each, Rfl: 5    Latanoprostene Bunod (Vyzulta) 0 024 % SOLN, Apply to eye, Disp: , Rfl:     linaGLIPtin (Tradjenta) 5 MG TABS, Take 5 mg by mouth daily, Disp: 90 tablet, Rfl: 3    losartan (COZAAR) 25 mg tablet, Take 1 tablet (25 mg total) by mouth daily, Disp: 90 tablet, Rfl: 3    meclizine (ANTIVERT) 25 mg tablet, Take 1 tablet (25 mg total) by mouth 2 (two) times a day, Disp: 180 tablet, Rfl: 3    memantine (NAMENDA) 10 mg tablet, Take 1 tablet (10 mg total) by mouth 2 (two) times a day, Disp: 180 tablet, Rfl: 3    Multiple Vitamin (Daily-Jr Multivitamin) TABS, Take 1 tablet by mouth daily, Disp: , Rfl:     Multiple Vitamins-Minerals (Centrum Silver) tablet, Take 1 tablet by mouth daily, Disp: 90 tablet, Rfl: 3    omeprazole (PriLOSEC) 20 mg delayed release capsule, Take 20 mg by mouth daily, Disp: , Rfl:     polyethylene glycol (MIRALAX) 17 g packet, Take 17 g by mouth daily as needed (constipation), Disp: 1 each, Rfl: 3    risperiDONE (RisperDAL) 0 25 mg tablet, Take 1 tablet (0 25 mg total) by mouth 2 (two) times a day, Disp: 180 tablet, Rfl: 3    senna-docusate sodium (SENOKOT S) 8 6-50 mg per tablet, Take 1 tablet by mouth daily at bedtime, Disp: 90 tablet, Rfl: 3    traZODone (DESYREL) 50 mg tablet, Take 1 tablet (50 mg total) by mouth daily at bedtime, Disp: 90 tablet, Rfl: 3    Current Facility-Administered Medications:     bupivacaine (PF) (MARCAINE) 0 25 % injection 10 mL, 10 mL, Perineural, Once, [facility], DO    lidocaine (PF) (XYLOCAINE-MPF) 2 % injection 10 mL, 10 mL, Perineural, Once, [facility], DO    sodium chloride (PF) 0 9 % injection 10 mL, 10 mL, Infiltration, Once, [facility], DO    Allergies   Allergen Reactions    Codeine        Physical Exam:   Vitals:    03/21/22 0804   BP: 161/80   Pulse: 73   Resp: 20   Temp: 98 6 °F (37 °C)   SpO2: 97%     General: Awake, Alert, Oriented x 3, Mood and affect appropriate  Respiratory: Respirations even and unlabored  Cardiovascular: Peripheral pulses intact; no edema  Musculoskeletal Exam: TTP right medial joint line    ASA Score: 2    Patient/Chart Verification  Patient ID Verified: Verbal  ID Band Applied: No  Consents Confirmed: Procedural,To be obtained in the Pre-Procedure area  H&P( within 30 days) Verified: To be obtained in the Pre-Procedure area  Allergies Reviewed: Yes  Anticoag/NSAID held?: No  Currently on antibiotics?: No    Assessment:   1  Gait instability    2   Bilateral primary osteoarthritis of knee        Plan: Right genicular nerve blocks

## 2022-03-21 NOTE — DISCHARGE INSTR - LAB

## 2022-03-29 ENCOUNTER — TELEPHONE (OUTPATIENT)
Dept: PSYCHIATRY | Facility: CLINIC | Age: 77
End: 2022-03-29

## 2022-03-29 DIAGNOSIS — I10 ESSENTIAL HYPERTENSION: ICD-10-CM

## 2022-03-29 RX ORDER — AMLODIPINE BESYLATE 10 MG/1
TABLET ORAL
Qty: 90 TABLET | Refills: 1 | Status: SHIPPED | OUTPATIENT
Start: 2022-03-29

## 2022-03-29 NOTE — TELEPHONE ENCOUNTER
Spoke with pt daughter as she gave verbal consent to speak   pt was placed on waitlist for talk therapy

## 2022-04-30 ENCOUNTER — HOSPITAL ENCOUNTER (INPATIENT)
Facility: HOSPITAL | Age: 77
LOS: 4 days | Discharge: NURSING, REHAB OR LONG-TERM CARE | DRG: 558 | End: 2022-05-04
Attending: EMERGENCY MEDICINE | Admitting: INTERNAL MEDICINE
Payer: MEDICARE

## 2022-04-30 ENCOUNTER — APPOINTMENT (EMERGENCY)
Dept: CT IMAGING | Facility: HOSPITAL | Age: 77
DRG: 558 | End: 2022-04-30
Payer: MEDICARE

## 2022-04-30 ENCOUNTER — APPOINTMENT (EMERGENCY)
Dept: VASCULAR ULTRASOUND | Facility: HOSPITAL | Age: 77
DRG: 558 | End: 2022-04-30
Payer: MEDICARE

## 2022-04-30 DIAGNOSIS — R26.2 AMBULATORY DYSFUNCTION: ICD-10-CM

## 2022-04-30 DIAGNOSIS — S05.11XA: ICD-10-CM

## 2022-04-30 DIAGNOSIS — N39.0 UTI (URINARY TRACT INFECTION): ICD-10-CM

## 2022-04-30 DIAGNOSIS — R62.7 FAILURE TO THRIVE IN ADULT: ICD-10-CM

## 2022-04-30 DIAGNOSIS — R53.1 GENERALIZED WEAKNESS: Primary | ICD-10-CM

## 2022-04-30 DIAGNOSIS — R60.0 LEG EDEMA, RIGHT: ICD-10-CM

## 2022-04-30 PROBLEM — R74.8 ELEVATED CK: Status: ACTIVE | Noted: 2022-04-30

## 2022-04-30 LAB
2HR DELTA HS TROPONIN: 11 NG/L
ALBUMIN SERPL BCP-MCNC: 3.1 G/DL (ref 3.5–5)
ALP SERPL-CCNC: 127 U/L (ref 46–116)
ALT SERPL W P-5'-P-CCNC: 30 U/L (ref 12–78)
ANION GAP SERPL CALCULATED.3IONS-SCNC: 14 MMOL/L (ref 4–13)
APTT PPP: 26 SECONDS (ref 23–37)
AST SERPL W P-5'-P-CCNC: 37 U/L (ref 5–45)
BASE EX.OXY STD BLDV CALC-SCNC: 89.3 % (ref 60–80)
BASE EXCESS BLDV CALC-SCNC: -3.6 MMOL/L
BASOPHILS # BLD AUTO: 0.05 THOUSANDS/ΜL (ref 0–0.1)
BASOPHILS NFR BLD AUTO: 1 % (ref 0–1)
BILIRUB SERPL-MCNC: 0.64 MG/DL (ref 0.2–1)
BUN SERPL-MCNC: 22 MG/DL (ref 5–25)
CALCIUM ALBUM COR SERPL-MCNC: 10.8 MG/DL (ref 8.3–10.1)
CALCIUM SERPL-MCNC: 10.1 MG/DL (ref 8.3–10.1)
CARDIAC TROPONIN I PNL SERPL HS: 16 NG/L
CARDIAC TROPONIN I PNL SERPL HS: 27 NG/L
CHLORIDE SERPL-SCNC: 102 MMOL/L (ref 100–108)
CK MB SERPL-MCNC: 1.6 NG/ML (ref 0–5)
CK MB SERPL-MCNC: <1 % (ref 0–2.5)
CK SERPL-CCNC: 372 U/L (ref 26–192)
CO2 SERPL-SCNC: 20 MMOL/L (ref 21–32)
CREAT SERPL-MCNC: 1.93 MG/DL (ref 0.6–1.3)
EOSINOPHIL # BLD AUTO: 0 THOUSAND/ΜL (ref 0–0.61)
EOSINOPHIL NFR BLD AUTO: 0 % (ref 0–6)
ERYTHROCYTE [DISTWIDTH] IN BLOOD BY AUTOMATED COUNT: 16.2 % (ref 11.6–15.1)
EST. AVERAGE GLUCOSE BLD GHB EST-MCNC: 120 MG/DL
GFR SERPL CREATININE-BSD FRML MDRD: 24 ML/MIN/1.73SQ M
GLUCOSE SERPL-MCNC: 121 MG/DL (ref 65–140)
GLUCOSE SERPL-MCNC: 154 MG/DL (ref 65–140)
HBA1C MFR BLD: 5.8 %
HCO3 BLDV-SCNC: 20 MMOL/L (ref 24–30)
HCT VFR BLD AUTO: 41 % (ref 34.8–46.1)
HGB BLD-MCNC: 12.7 G/DL (ref 11.5–15.4)
IMM GRANULOCYTES # BLD AUTO: 0.07 THOUSAND/UL (ref 0–0.2)
IMM GRANULOCYTES NFR BLD AUTO: 1 % (ref 0–2)
INR PPP: 0.94 (ref 0.84–1.19)
LYMPHOCYTES # BLD AUTO: 0.64 THOUSANDS/ΜL (ref 0.6–4.47)
LYMPHOCYTES NFR BLD AUTO: 6 % (ref 14–44)
MAGNESIUM SERPL-MCNC: 1.9 MG/DL (ref 1.6–2.6)
MCH RBC QN AUTO: 26.3 PG (ref 26.8–34.3)
MCHC RBC AUTO-ENTMCNC: 31 G/DL (ref 31.4–37.4)
MCV RBC AUTO: 85 FL (ref 82–98)
MONOCYTES # BLD AUTO: 0.9 THOUSAND/ΜL (ref 0.17–1.22)
MONOCYTES NFR BLD AUTO: 8 % (ref 4–12)
NEUTROPHILS # BLD AUTO: 9.13 THOUSANDS/ΜL (ref 1.85–7.62)
NEUTS SEG NFR BLD AUTO: 84 % (ref 43–75)
NRBC BLD AUTO-RTO: 0 /100 WBCS
O2 CT BLDV-SCNC: 17 ML/DL
PCO2 BLDV: 31.7 MM HG (ref 42–50)
PH BLDV: 7.42 [PH] (ref 7.3–7.4)
PLATELET # BLD AUTO: 276 THOUSANDS/UL (ref 149–390)
PMV BLD AUTO: 9 FL (ref 8.9–12.7)
PO2 BLDV: 62.1 MM HG (ref 35–45)
POTASSIUM SERPL-SCNC: 4.5 MMOL/L (ref 3.5–5.3)
PROT SERPL-MCNC: 7.3 G/DL (ref 6.4–8.2)
PROTHROMBIN TIME: 12.6 SECONDS (ref 11.6–14.5)
RBC # BLD AUTO: 4.83 MILLION/UL (ref 3.81–5.12)
SODIUM SERPL-SCNC: 136 MMOL/L (ref 136–145)
TSH SERPL DL<=0.05 MIU/L-ACNC: 3.58 UIU/ML (ref 0.45–4.5)
WBC # BLD AUTO: 10.79 THOUSAND/UL (ref 4.31–10.16)

## 2022-04-30 PROCEDURE — 93005 ELECTROCARDIOGRAM TRACING: CPT

## 2022-04-30 PROCEDURE — 82746 ASSAY OF FOLIC ACID SERUM: CPT | Performed by: NURSE PRACTITIONER

## 2022-04-30 PROCEDURE — 36415 COLL VENOUS BLD VENIPUNCTURE: CPT | Performed by: EMERGENCY MEDICINE

## 2022-04-30 PROCEDURE — 93971 EXTREMITY STUDY: CPT

## 2022-04-30 PROCEDURE — 99285 EMERGENCY DEPT VISIT HI MDM: CPT

## 2022-04-30 PROCEDURE — 83735 ASSAY OF MAGNESIUM: CPT | Performed by: EMERGENCY MEDICINE

## 2022-04-30 PROCEDURE — 99285 EMERGENCY DEPT VISIT HI MDM: CPT | Performed by: EMERGENCY MEDICINE

## 2022-04-30 PROCEDURE — 82805 BLOOD GASES W/O2 SATURATION: CPT | Performed by: EMERGENCY MEDICINE

## 2022-04-30 PROCEDURE — 96365 THER/PROPH/DIAG IV INF INIT: CPT

## 2022-04-30 PROCEDURE — 82948 REAGENT STRIP/BLOOD GLUCOSE: CPT

## 2022-04-30 PROCEDURE — 82553 CREATINE MB FRACTION: CPT | Performed by: EMERGENCY MEDICINE

## 2022-04-30 PROCEDURE — 82550 ASSAY OF CK (CPK): CPT | Performed by: EMERGENCY MEDICINE

## 2022-04-30 PROCEDURE — 85025 COMPLETE CBC W/AUTO DIFF WBC: CPT | Performed by: EMERGENCY MEDICINE

## 2022-04-30 PROCEDURE — 80053 COMPREHEN METABOLIC PANEL: CPT | Performed by: EMERGENCY MEDICINE

## 2022-04-30 PROCEDURE — 85730 THROMBOPLASTIN TIME PARTIAL: CPT | Performed by: EMERGENCY MEDICINE

## 2022-04-30 PROCEDURE — 70450 CT HEAD/BRAIN W/O DYE: CPT

## 2022-04-30 PROCEDURE — 84484 ASSAY OF TROPONIN QUANT: CPT | Performed by: EMERGENCY MEDICINE

## 2022-04-30 PROCEDURE — G1004 CDSM NDSC: HCPCS

## 2022-04-30 PROCEDURE — 82607 VITAMIN B-12: CPT | Performed by: NURSE PRACTITIONER

## 2022-04-30 PROCEDURE — 85610 PROTHROMBIN TIME: CPT | Performed by: EMERGENCY MEDICINE

## 2022-04-30 PROCEDURE — 99223 1ST HOSP IP/OBS HIGH 75: CPT | Performed by: NURSE PRACTITIONER

## 2022-04-30 PROCEDURE — 84443 ASSAY THYROID STIM HORMONE: CPT | Performed by: EMERGENCY MEDICINE

## 2022-04-30 PROCEDURE — 83036 HEMOGLOBIN GLYCOSYLATED A1C: CPT | Performed by: NURSE PRACTITIONER

## 2022-04-30 RX ORDER — DONEPEZIL HYDROCHLORIDE 5 MG/1
10 TABLET, FILM COATED ORAL
Status: DISCONTINUED | OUTPATIENT
Start: 2022-04-30 | End: 2022-05-04 | Stop reason: HOSPADM

## 2022-04-30 RX ORDER — ATORVASTATIN CALCIUM 40 MG/1
40 TABLET, FILM COATED ORAL DAILY
Status: DISCONTINUED | OUTPATIENT
Start: 2022-05-01 | End: 2022-05-01

## 2022-04-30 RX ORDER — ACETAMINOPHEN 325 MG/1
650 TABLET ORAL EVERY 6 HOURS PRN
Status: DISCONTINUED | OUTPATIENT
Start: 2022-04-30 | End: 2022-05-04 | Stop reason: HOSPADM

## 2022-04-30 RX ORDER — ASPIRIN 81 MG/1
81 TABLET ORAL DAILY
Status: DISCONTINUED | OUTPATIENT
Start: 2022-05-01 | End: 2022-05-04 | Stop reason: HOSPADM

## 2022-04-30 RX ORDER — MECLIZINE HYDROCHLORIDE 25 MG/1
25 TABLET ORAL EVERY 12 HOURS PRN
Status: DISCONTINUED | OUTPATIENT
Start: 2022-04-30 | End: 2022-05-04 | Stop reason: HOSPADM

## 2022-04-30 RX ORDER — AMLODIPINE BESYLATE 10 MG/1
10 TABLET ORAL DAILY
Status: DISCONTINUED | OUTPATIENT
Start: 2022-05-01 | End: 2022-05-04 | Stop reason: HOSPADM

## 2022-04-30 RX ORDER — INSULIN LISPRO 100 [IU]/ML
1-5 INJECTION, SOLUTION INTRAVENOUS; SUBCUTANEOUS
Status: DISCONTINUED | OUTPATIENT
Start: 2022-04-30 | End: 2022-05-04 | Stop reason: HOSPADM

## 2022-04-30 RX ORDER — SODIUM CHLORIDE, SODIUM LACTATE, POTASSIUM CHLORIDE, CALCIUM CHLORIDE 600; 310; 30; 20 MG/100ML; MG/100ML; MG/100ML; MG/100ML
50 INJECTION, SOLUTION INTRAVENOUS CONTINUOUS
Status: DISCONTINUED | OUTPATIENT
Start: 2022-04-30 | End: 2022-05-01

## 2022-04-30 RX ORDER — LOSARTAN POTASSIUM 25 MG/1
25 TABLET ORAL DAILY
Status: DISCONTINUED | OUTPATIENT
Start: 2022-05-01 | End: 2022-05-04 | Stop reason: HOSPADM

## 2022-04-30 RX ORDER — TRAZODONE HYDROCHLORIDE 50 MG/1
50 TABLET ORAL
Status: DISCONTINUED | OUTPATIENT
Start: 2022-04-30 | End: 2022-05-04 | Stop reason: HOSPADM

## 2022-04-30 RX ORDER — HEPARIN SODIUM 5000 [USP'U]/ML
5000 INJECTION, SOLUTION INTRAVENOUS; SUBCUTANEOUS EVERY 8 HOURS SCHEDULED
Status: DISCONTINUED | OUTPATIENT
Start: 2022-04-30 | End: 2022-05-04 | Stop reason: HOSPADM

## 2022-04-30 RX ORDER — FLUTICASONE PROPIONATE 50 MCG
1 SPRAY, SUSPENSION (ML) NASAL DAILY
Status: DISCONTINUED | OUTPATIENT
Start: 2022-05-01 | End: 2022-05-04 | Stop reason: HOSPADM

## 2022-04-30 RX ORDER — CITALOPRAM 20 MG/1
40 TABLET ORAL DAILY
Status: DISCONTINUED | OUTPATIENT
Start: 2022-05-01 | End: 2022-05-04 | Stop reason: HOSPADM

## 2022-04-30 RX ORDER — MEMANTINE HYDROCHLORIDE 10 MG/1
10 TABLET ORAL 2 TIMES DAILY
Status: DISCONTINUED | OUTPATIENT
Start: 2022-04-30 | End: 2022-05-04 | Stop reason: HOSPADM

## 2022-04-30 RX ORDER — AMOXICILLIN 250 MG
1 CAPSULE ORAL
Status: DISCONTINUED | OUTPATIENT
Start: 2022-04-30 | End: 2022-05-04 | Stop reason: HOSPADM

## 2022-04-30 RX ORDER — INSULIN LISPRO 100 [IU]/ML
1-6 INJECTION, SOLUTION INTRAVENOUS; SUBCUTANEOUS
Status: DISCONTINUED | OUTPATIENT
Start: 2022-05-01 | End: 2022-05-04 | Stop reason: HOSPADM

## 2022-04-30 RX ORDER — FERROUS SULFATE 325(65) MG
325 TABLET ORAL
Status: DISCONTINUED | OUTPATIENT
Start: 2022-05-01 | End: 2022-05-04 | Stop reason: HOSPADM

## 2022-04-30 RX ORDER — LATANOPROST 50 UG/ML
1 SOLUTION/ DROPS OPHTHALMIC
Status: DISCONTINUED | OUTPATIENT
Start: 2022-04-30 | End: 2022-05-04 | Stop reason: HOSPADM

## 2022-04-30 RX ORDER — BUPROPION HYDROCHLORIDE 150 MG/1
150 TABLET ORAL DAILY
Refills: 2 | Status: DISCONTINUED | OUTPATIENT
Start: 2022-05-01 | End: 2022-05-04 | Stop reason: HOSPADM

## 2022-04-30 RX ORDER — PANTOPRAZOLE SODIUM 40 MG/1
40 TABLET, DELAYED RELEASE ORAL
Status: DISCONTINUED | OUTPATIENT
Start: 2022-05-01 | End: 2022-05-04 | Stop reason: HOSPADM

## 2022-04-30 RX ORDER — LORATADINE 10 MG/1
5 TABLET ORAL DAILY
Status: DISCONTINUED | OUTPATIENT
Start: 2022-05-01 | End: 2022-05-04 | Stop reason: HOSPADM

## 2022-04-30 RX ORDER — DORZOLAMIDE HYDROCHLORIDE AND TIMOLOL MALEATE 20; 5 MG/ML; MG/ML
1 SOLUTION/ DROPS OPHTHALMIC EVERY 12 HOURS SCHEDULED
Status: DISCONTINUED | OUTPATIENT
Start: 2022-04-30 | End: 2022-05-04 | Stop reason: HOSPADM

## 2022-04-30 RX ORDER — RIBOFLAVIN (VITAMIN B2) 100 MG
100 TABLET ORAL DAILY
Status: DISCONTINUED | OUTPATIENT
Start: 2022-05-01 | End: 2022-04-30 | Stop reason: RX

## 2022-04-30 RX ORDER — HYDRALAZINE HYDROCHLORIDE 25 MG/1
25 TABLET, FILM COATED ORAL 3 TIMES DAILY
Status: DISCONTINUED | OUTPATIENT
Start: 2022-04-30 | End: 2022-05-04 | Stop reason: HOSPADM

## 2022-04-30 RX ORDER — RISPERIDONE 0.25 MG/1
0.25 TABLET, FILM COATED ORAL 2 TIMES DAILY
Status: DISCONTINUED | OUTPATIENT
Start: 2022-04-30 | End: 2022-05-04 | Stop reason: HOSPADM

## 2022-04-30 RX ORDER — POLYETHYLENE GLYCOL 3350 17 G/17G
17 POWDER, FOR SOLUTION ORAL DAILY PRN
Status: DISCONTINUED | OUTPATIENT
Start: 2022-04-30 | End: 2022-05-04 | Stop reason: HOSPADM

## 2022-04-30 RX ORDER — MULTIVIT WITH MINERALS/LUTEIN
125 TABLET ORAL DAILY
Status: DISCONTINUED | OUTPATIENT
Start: 2022-05-01 | End: 2022-05-04 | Stop reason: HOSPADM

## 2022-04-30 RX ADMIN — RISPERIDONE 0.25 MG: 0.25 TABLET ORAL at 22:29

## 2022-04-30 RX ADMIN — SODIUM CHLORIDE, SODIUM LACTATE, POTASSIUM CHLORIDE, AND CALCIUM CHLORIDE 50 ML/HR: .6; .31; .03; .02 INJECTION, SOLUTION INTRAVENOUS at 22:30

## 2022-04-30 RX ADMIN — TRAZODONE HYDROCHLORIDE 50 MG: 50 TABLET ORAL at 22:29

## 2022-04-30 RX ADMIN — SENNOSIDES AND DOCUSATE SODIUM 1 TABLET: 50; 8.6 TABLET ORAL at 22:29

## 2022-04-30 RX ADMIN — MEMANTINE 10 MG: 10 TABLET ORAL at 22:29

## 2022-04-30 RX ADMIN — HEPARIN SODIUM 5000 UNITS: 5000 INJECTION INTRAVENOUS; SUBCUTANEOUS at 22:30

## 2022-04-30 RX ADMIN — DORZOLAMIDE HYDROCHLORIDE AND TIMOLOL MALEATE 1 DROP: 22.3; 6.8 SOLUTION/ DROPS OPHTHALMIC at 22:31

## 2022-04-30 RX ADMIN — HYDRALAZINE HYDROCHLORIDE 25 MG: 25 TABLET ORAL at 22:29

## 2022-04-30 RX ADMIN — SODIUM CHLORIDE, SODIUM LACTATE, POTASSIUM CHLORIDE, AND CALCIUM CHLORIDE 1000 ML: .6; .31; .03; .02 INJECTION, SOLUTION INTRAVENOUS at 19:13

## 2022-04-30 RX ADMIN — DONEPEZIL HYDROCHLORIDE 10 MG: 5 TABLET, FILM COATED ORAL at 22:29

## 2022-04-30 NOTE — ED PROVIDER NOTES
History  Chief Complaint   Patient presents with    Weakness - Generalized     slid out of shower chair onto her knees  no headstrike  no LOC  hx of alzheimer's  History provided by:  Patient and EMS personnel  History limited by:  Dementia   used: No    61-year-old female with history of Alzheimer's dementia brought by EMS for evaluation reportedly after she slid out of a shower chair onto her knees  There is no reported head strike or loss of consciousness  She does take aspirin  Patient is unable to tell me what happened  She does not recall falling today  She reported that everything hurt but could not provide details  She is a bit drowsy, falls asleep while talking to me, but able to wake to voice  She follows commands well  No focal deficits does have generalized muscular weakness  She is unable to all hold herself sitting up in bed  She is unable to lift her legs off the bed  Has some weakness in both upper extremities as well  Small right orbital contusion  No C-spine tenderness, T-spine tenderness, L-spine tenderness  There is no chest wall or abdominal tenderness  She has some stiffness and crepitus in the right knee  This appears to be chronic based on prior notes with osteoarthritis of both knees  Has pitting edema in the right lower extremity but not the left  Unclear if this is chronic  Plan discussion with family member/caregiver and will re-evaluate for further workup  She does not appear acutely injured  Prior to Admission Medications   Prescriptions Last Dose Informant Patient Reported? Taking?    Ascorbic Acid (VITAMIN C) 100 MG tablet  Child Yes No   Sig: Take 100 mg by mouth daily   Azopt 1 % ophthalmic suspension  Child Yes No   Brimonidine Tartrate-Timolol (COMBIGAN OP)  Child Yes No   Sig: Apply to eye   Calcifediol ER (Rayaldee) 30 MCG CPCR  Child No No   Sig: Take 30 mcg by mouth daily   Incontinence Supply Disposable ([place] Fresh Moist Wipes) MISC  Child No No   Sig: Use 6 (six) times a day   Incontinence Supply Disposable (Depend Underwear Large/XL) MISC  Child No No   Sig: Use 6 (six) times a day   Latanoprostene Bunod (Vyzulta) 0 024 % SOLN  Child Yes No   Sig: Apply to eye   Multiple Vitamin (Daily-Jr Multivitamin) TABS  Child Yes No   Sig: Take 1 tablet by mouth daily   Multiple Vitamins-Minerals (Centrum Silver) tablet  Child No No   Sig: Take 1 tablet by mouth daily   amLODIPine (NORVASC) 10 mg tablet   No No   Sig: TAKE 1 TABLET BY MOUTH EVERY DAY   aspirin (ECOTRIN LOW STRENGTH) 81 mg EC tablet  Child Yes No   Sig: Take 81 mg by mouth daily   atorvastatin (LIPITOR) 40 mg tablet  Child No No   Sig: Take 1 tablet (40 mg total) by mouth daily   buPROPion (WELLBUTRIN SR) 150 mg 12 hr tablet  Child No No   Sig: Take 1 tablet (150 mg total) by mouth 2 (two) times a day   cetirizine (ZyrTEC) 10 mg tablet  Child Yes No   Sig: Take 10 mg by mouth daily   citalopram (CeleXA) 40 mg tablet  Child No No   Sig: Take 1 tablet (40 mg total) by mouth daily   diclofenac sodium (VOLTAREN) 1 %  Child No No   Sig: Apply 2 g topically 3 (three) times a day as needed (Pain)   donepezil (ARICEPT) 10 mg tablet  Child No No   Sig: Take 1 tablet (10 mg total) by mouth daily at bedtime   dorzolamide (TRUSOPT) 2 % ophthalmic solution  Child Yes No   Si drop 3 (three) times a day   ferrous sulfate 325 (65 Fe) mg tablet  Child Yes No   Sig: Take 325 mg by mouth daily with breakfast   fluticasone (FLONASE) 50 mcg/act nasal spray   No No   Sig: INSTILL ONE (1) SPRAY IN EACH NOSTRIL DAILY   furosemide (LASIX) 20 mg tablet  Child No No   Sig: TAKE 1/2 TABLET BY MOUTH EVERY DAY   hydrALAZINE (APRESOLINE) 25 mg tablet  Child No No   Sig: TAKE 1 TABLET BY MOUTH THREE TIMES A DAY   linaGLIPtin (Tradjenta) 5 MG TABS  Child No No   Sig: Take 5 mg by mouth daily   losartan (COZAAR) 25 mg tablet  Child No No   Sig: Take 1 tablet (25 mg total) by mouth daily   meclizine (ANTIVERT) 25 mg tablet  Child No No   Sig: Take 1 tablet (25 mg total) by mouth 2 (two) times a day   memantine (NAMENDA) 10 mg tablet  Child No No   Sig: Take 1 tablet (10 mg total) by mouth 2 (two) times a day   omeprazole (PriLOSEC) 20 mg delayed release capsule  Child Yes No   Sig: Take 20 mg by mouth daily   polyethylene glycol (MIRALAX) 17 g packet  Child No No   Sig: Take 17 g by mouth daily as needed (constipation)   risperiDONE (RisperDAL) 0 25 mg tablet  Child No No   Sig: Take 1 tablet (0 25 mg total) by mouth 2 (two) times a day   senna-docusate sodium (SENOKOT S) 8 6-50 mg per tablet  Child No No   Sig: Take 1 tablet by mouth daily at bedtime   traZODone (DESYREL) 50 mg tablet  Child No No   Sig: Take 1 tablet (50 mg total) by mouth daily at bedtime      Facility-Administered Medications: None       Past Medical History:   Diagnosis Date    Acute cystitis without hematuria 8/13/2020    Allergic rhinitis 5/18/2021    Anxiety     Arm swelling 8/30/2021    Cholesterol depletion     Confusion     Confusion 5/17/2021    Dementia (HCC)     Depression     [facility]     [facility]     Encounter for screening colonoscopy 9/22/2020    Glaucoma     Hypertension     Hypertension     Memory loss     Right leg swelling 8/30/2021       Past Surgical History:   Procedure Laterality Date    CYSTOSCOPY  06/10/2021    HYSTERECTOMY         Family History   Problem Relation Age of Onset    Diabetes Mother     Hypertension Mother      I have reviewed and agree with the history as documented      E-Cigarette/Vaping    E-Cigarette Use Never User      E-Cigarette/Vaping Substances    Nicotine No     THC No     CBD No     Flavoring No     Other No     Unknown No      Social History     Tobacco Use    Smoking status: Former Smoker     Types: Cigarettes     Quit date: 7/30/2018     Years since quitting: 3 7    Smokeless tobacco: Never Used   Vaping Use    Vaping Use: Never used   Substance Use Topics    Alcohol use: Not Currently    Drug use: Never       Review of Systems   Unable to perform ROS: Dementia       Physical Exam  Physical Exam  Vitals and nursing note reviewed  Constitutional:       Appearance: Normal appearance  Comments: Somewhat drowsy but follows commands well  HENT:      Head: Normocephalic  Comments: Small right superior orbital contusion  Nose: Nose normal    Eyes:      Conjunctiva/sclera: Conjunctivae normal       Pupils: Pupils are equal, round, and reactive to light  Neck:      Comments: No C-spine, T-spine, L-spine tenderness  Cardiovascular:      Rate and Rhythm: Normal rate and regular rhythm  Pulses: Normal pulses  Heart sounds: Normal heart sounds  Pulmonary:      Effort: Pulmonary effort is normal       Breath sounds: Normal breath sounds  Comments: No rib tenderness  Chest:      Chest wall: No tenderness  Abdominal:      General: There is no distension  Palpations: Abdomen is soft  Tenderness: There is no abdominal tenderness  Musculoskeletal:      Cervical back: Normal range of motion and neck supple  Comments: Some stiffness in the right knee but no significant pain with movement  Right lower extremity with 1+ pitting edema  Able to range other extremities without discomfort  Skin:     General: Skin is warm and dry  Capillary Refill: Capillary refill takes less than 2 seconds  Findings: No bruising, erythema or lesion  Neurological:      General: No focal deficit present  Comments: Oriented to person     Psychiatric:         Mood and Affect: Mood normal          Behavior: Behavior normal          Vital Signs  ED Triage Vitals [04/30/22 1553]   Temperature Pulse Respirations Blood Pressure SpO2   98 6 °F (37 °C) 91 18 157/76 99 %      Temp Source Heart Rate Source Patient Position - Orthostatic VS BP Location FiO2 (%)   Oral Monitor -- Right arm --      Pain Score --           Vitals:    04/30/22 1554 04/30/22 1600 04/30/22 1800 04/30/22 1830   BP: 157/76 154/76 163/82 157/83   Pulse:  94 96 98         Visual Acuity      ED Medications  Medications   lactated ringers bolus 1,000 mL (1,000 mL Intravenous New Bag 4/30/22 1913)       Diagnostic Studies  Results Reviewed     Procedure Component Value Units Date/Time    HS Troponin I 2hr [[id number]] Collected: 04/30/22 1922    Lab Status: In process Specimen: Blood from Arm, Left Updated: 04/30/22 1924    HS Troponin I 4hr [[id number]]     Lab Status: No result Specimen: Blood     CKMB [[id number]]  (Normal) Collected: 04/30/22 1711    Lab Status: Final result Specimen: Blood from Arm, Left Updated: 04/30/22 1847     CK-MB Index <1 0 %      CK-MB 1 6 ng/mL     Protime-INR [[id number]]  (Normal) Collected: 04/30/22 1818    Lab Status: Final result Specimen: Blood from Arm, Left Updated: 04/30/22 1841     Protime 12 6 seconds      INR 0 94    APTT [[id number]]  (Normal) Collected: 04/30/22 1818    Lab Status: Final result Specimen: Blood from Arm, Left Updated: 04/30/22 1841     PTT 26 seconds     TSH [[id number]]  (Normal) Collected: 04/30/22 1711    Lab Status: Final result Specimen: Blood from Arm, Left Updated: 04/30/22 1819     TSH 3RD GENERATON 3 578 uIU/mL     Narrative:      Patients undergoing fluorescein dye angiography may retain small amounts of fluorescein in the body for 48-72 hours post procedure  Samples containing fluorescein can produce falsely depressed TSH values  If the patient had this procedure,a specimen should be resubmitted post fluorescein clearance        Magnesium [[id number]]  (Normal) Collected: 04/30/22 1711    Lab Status: Final result Specimen: Blood from Arm, Left Updated: 04/30/22 1819     Magnesium 1 9 mg/dL     CK Total with Reflex CKMB [[id number]]  (Abnormal) Collected: 04/30/22 1711    Lab Status: Final result Specimen: Blood from Arm, Left Updated: 04/30/22 1819     Total  U/L Comprehensive metabolic panel [[id number]]  (Abnormal) Collected: 04/30/22 1711    Lab Status: Final result Specimen: Blood from Arm, Left Updated: 04/30/22 1802     Sodium 136 mmol/L      Potassium 4 5 mmol/L      Chloride 102 mmol/L      CO2 20 mmol/L      ANION GAP 14 mmol/L      BUN 22 mg/dL      Creatinine 1 93 mg/dL      Glucose 154 mg/dL      Calcium 10 1 mg/dL      Corrected Calcium 10 8 mg/dL      AST 37 U/L      ALT 30 U/L      Alkaline Phosphatase 127 U/L      Total Protein 7 3 g/dL      Albumin 3 1 g/dL      Total Bilirubin 0 64 mg/dL      eGFR 24 ml/min/1 73sq m     Narrative:      National Kid[employer] Disease Foundation guidelines for Chronic Kidney Disease (CKD):     Stage 1 with normal or high GFR (GFR > 90 mL/min/1 73 square meters)    Stage 2 Mild CKD (GFR = 60-89 mL/min/1 73 square meters)    Stage 3A Moderate CKD (GFR = 45-59 mL/min/1 73 square meters)    Stage 3B Moderate CKD (GFR = 30-44 mL/min/1 73 square meters)    Stage 4 Severe CKD (GFR = 15-29 mL/min/1 73 square meters)    Stage 5 End Stage CKD (GFR <15 mL/min/1 73 square meters)  Note: GFR calculation is accurate only with a steady state creatinine    HS Troponin 0hr (reflex protocol) [[id number]]  (Normal) Collected: 04/30/22 1711    Lab Status: Final result Specimen: Blood from Arm, Left Updated: 04/30/22 1758     hs TnI 0hr 16 ng/L     Blood gas, venous [[id number]]  (Abnormal) Collected: 04/30/22 1711    Lab Status: Final result Specimen: Blood from Arm, Left Updated: 04/30/22 1722     pH, Danielito 7 417     pCO2, Danielito 31 7 mm Hg      pO2, Danielito 62 1 mm Hg      HCO3, Danielito 20 0 mmol/L      Base Excess, Danielito -3 6 mmol/L      O2 Content, Danielito 17 0 ml/dL      O2 HGB, VENOUS 89 3 %     CBC and differential [[id number]]  (Abnormal) Collected: 04/30/22 1711    Lab Status: Final result Specimen: Blood from Arm, Left Updated: 04/30/22 1722     WBC 10 79 Thousand/uL      RBC 4 83 Million/uL      Hemoglobin 12 7 g/dL      Hematocrit 41 0 %      MCV 85 fL MCH 26 3 pg      MCHC 31 0 g/dL      RDW 16 2 %      MPV 9 0 fL      Platelets 597 Thousands/uL      nRBC 0 /100 WBCs      Neutrophils Relative 84 %      Immat GRANS % 1 %      Lymphocytes Relative 6 %      Monocytes Relative 8 %      Eosinophils Relative 0 %      Basophils Relative 1 %      Neutrophils Absolute 9 13 Thousands/µL      Immature Grans Absolute 0 07 Thousand/uL      Lymphocytes Absolute 0 64 Thousands/µL      Monocytes Absolute 0 90 Thousand/µL      Eosinophils Absolute 0 00 Thousand/µL      Basophils Absolute 0 05 Thousands/µL                  CT head without contrast   Final Result by Juliane Arreaga MD (04/30 1842)      No acute intracranial abnormality  Workstation performed: [id number]         VAS lower limb venous duplex study, unilateral/limited   ED Interpretation by Maxmiino Astorga MD (04/30 1702)   Negative for DVT  Procedures  ECG 12 Lead Documentation Only    Date/Time: 4/30/2022 4:11 PM  Performed by: Maximino Astorga MD  Authorized by: Maximino Astorga MD     Indications / Diagnosis:  Weakness  ECG reviewed by me, the ED Provider: yes    Patient location:  ED  Previous ECG:     Previous ECG:  Compared to current    Comparison ECG info:  6/18/21  Rate:     ECG rate:  95  Rhythm:     Rhythm: sinus rhythm    Ectopy:     Ectopy: none    QRS:     QRS axis:  Left  Conduction:     Conduction: normal    ST segments:     ST segments:  Normal  T waves:     T waves: normal               ED Course  ED Course as of 04/30/22 1945   Sat Apr 30, 2022   1627 Discussed case with patient's daughter via phone who was with her today  She states that she was giving her shower  Was sitting on the shower chair and the patient seemed to intentionally slight herself for it and landed on her knees  There was no head strike today  There was, however a fall with head strike yesterday    Daughter reports that overall she has been generally weak and has had difficulty moving her legs  Even with the assistance of another aide she has had significant difficulty with ambulation  States she is not following directions as well as she used to    Amalasuntha Bunkers Patient's daughter at bedside  Discussed lab work, CT  Will admit for PT evaluation, long-term placement  Daughter reports she needs a soft diet and needs assistance with eating  MDM  Number of Diagnoses or Management Options  Ambulatory dysfunction: new and requires workup  Contusion of right orbital tissues: new and requires workup  Failure to thrive in adult: new and requires workup  Generalized weakness: new and requires workup  Leg edema, right: new and requires workup  Diagnosis management comments: 43-year-old female with history of dementia brought by EMS from home for evaluation after sliding out of a shower chair onto her knees  Daughter reports that she has had progressive weakness and difficulty with ambulation  Had fall yesterday as well with head strike  On aspirin  Has a slight right orbital contusion  CT head unremarkable  Daughter reports that her care is becoming unmanageable at home  Needs significant help with ambulation, eating  Admitted for PT eval, long-term placement         Amount and/or Complexity of Data Reviewed  Clinical lab tests: ordered and reviewed  Tests in the radiology section of CPT®: ordered and reviewed  Obtain history from someone other than the patient: yes  Discuss the patient with other providers: yes  Independent visualization of images, tracings, or specimens: yes        Disposition  Final diagnoses:   Generalized weakness   Ambulatory dysfunction   Failure to thrive in adult   Contusion of right orbital tissues   Leg edema, right     Time reflects when diagnosis was documented in both MDM as applicable and the Disposition within this note     Time User Action Codes Description Comment    4/30/2022  6:38 PM Trudy Garber Add [R53 1] Generalized weakness     4/30/2022  6:38 PM Ly Khalil Add [R26 2] Ambulatory dysfunction     4/30/2022  6:38 PM Leonela Garber Add [R62 7] Failure to thrive in adult     4/30/2022  7:03 PM Josefina Spencer Add [S05 11XA] Contusion of right orbital tissues     4/30/2022  7:05 PM Amada RAY Add [R60 0] Leg edema, right       ED Disposition     ED Disposition Condition Date/Time Comment    Admit Stable Sat Apr 30, 2022  7:43 PM Case was discussed with Dr Luis Diaz and the patient's admission status was agreed to be Admission Status: inpatient status to the service of Dr Luis Diaz   Follow-up Information    None         Patient's Medications   Discharge Prescriptions    No medications on file       No discharge procedures on file      PDMP Review       Value Time User    PDMP Reviewed  Yes 6/17/2021 11:57 PM Deja Joel MD          ED Provider  Electronically Signed by           Iraida Banks MD  04/30/22 0193

## 2022-05-01 PROBLEM — M62.82 NON-TRAUMATIC RHABDOMYOLYSIS: Status: ACTIVE | Noted: 2022-04-30

## 2022-05-01 LAB
4HR DELTA HS TROPONIN: 13 NG/L
ALBUMIN SERPL BCP-MCNC: 2.5 G/DL (ref 3.5–5)
ALP SERPL-CCNC: 106 U/L (ref 46–116)
ALT SERPL W P-5'-P-CCNC: 28 U/L (ref 12–78)
ANION GAP SERPL CALCULATED.3IONS-SCNC: 9 MMOL/L (ref 4–13)
AST SERPL W P-5'-P-CCNC: 33 U/L (ref 5–45)
BACTERIA UR QL AUTO: ABNORMAL /HPF
BILIRUB SERPL-MCNC: 0.56 MG/DL (ref 0.2–1)
BILIRUB UR QL STRIP: NEGATIVE
BUN SERPL-MCNC: 20 MG/DL (ref 5–25)
CALCIUM ALBUM COR SERPL-MCNC: 10.5 MG/DL (ref 8.3–10.1)
CALCIUM SERPL-MCNC: 9.3 MG/DL (ref 8.3–10.1)
CARDIAC TROPONIN I PNL SERPL HS: 29 NG/L
CHLORIDE SERPL-SCNC: 108 MMOL/L (ref 100–108)
CK MB SERPL-MCNC: 1.5 NG/ML (ref 0–5)
CK MB SERPL-MCNC: <1 % (ref 0–2.5)
CK SERPL-CCNC: 324 U/L (ref 26–192)
CLARITY UR: ABNORMAL
CO2 SERPL-SCNC: 23 MMOL/L (ref 21–32)
COLOR UR: YELLOW
CREAT SERPL-MCNC: 1.59 MG/DL (ref 0.6–1.3)
ERYTHROCYTE [DISTWIDTH] IN BLOOD BY AUTOMATED COUNT: 16.2 % (ref 11.6–15.1)
FOLATE SERPL-MCNC: >20 NG/ML (ref 3.1–17.5)
GFR SERPL CREATININE-BSD FRML MDRD: 31 ML/MIN/1.73SQ M
GLUCOSE SERPL-MCNC: 110 MG/DL (ref 65–140)
GLUCOSE SERPL-MCNC: 147 MG/DL (ref 65–140)
GLUCOSE SERPL-MCNC: 173 MG/DL (ref 65–140)
GLUCOSE SERPL-MCNC: 91 MG/DL (ref 65–140)
GLUCOSE SERPL-MCNC: 98 MG/DL (ref 65–140)
GLUCOSE UR STRIP-MCNC: NEGATIVE MG/DL
HCT VFR BLD AUTO: 35.1 % (ref 34.8–46.1)
HGB BLD-MCNC: 11 G/DL (ref 11.5–15.4)
HGB UR QL STRIP.AUTO: ABNORMAL
KETONES UR STRIP-MCNC: NEGATIVE MG/DL
LACTATE SERPL-SCNC: 0.6 MMOL/L (ref 0.5–2)
LEUKOCYTE ESTERASE UR QL STRIP: ABNORMAL
MCH RBC QN AUTO: 26.3 PG (ref 26.8–34.3)
MCHC RBC AUTO-ENTMCNC: 31.3 G/DL (ref 31.4–37.4)
MCV RBC AUTO: 84 FL (ref 82–98)
NITRITE UR QL STRIP: NEGATIVE
NON-SQ EPI CELLS URNS QL MICRO: ABNORMAL /HPF
PH UR STRIP.AUTO: 6 [PH]
PLATELET # BLD AUTO: 260 THOUSANDS/UL (ref 149–390)
PMV BLD AUTO: 9.6 FL (ref 8.9–12.7)
POTASSIUM SERPL-SCNC: 4 MMOL/L (ref 3.5–5.3)
PROT SERPL-MCNC: 6 G/DL (ref 6.4–8.2)
PROT UR STRIP-MCNC: >=300 MG/DL
RBC # BLD AUTO: 4.19 MILLION/UL (ref 3.81–5.12)
RBC #/AREA URNS AUTO: ABNORMAL /HPF
SODIUM SERPL-SCNC: 140 MMOL/L (ref 136–145)
SP GR UR STRIP.AUTO: >=1.03 (ref 1–1.03)
UROBILINOGEN UR QL STRIP.AUTO: 0.2 E.U./DL
VIT B12 SERPL-MCNC: 727 PG/ML (ref 100–900)
WBC # BLD AUTO: 6.49 THOUSAND/UL (ref 4.31–10.16)
WBC #/AREA URNS AUTO: ABNORMAL /HPF

## 2022-05-01 PROCEDURE — 87086 URINE CULTURE/COLONY COUNT: CPT | Performed by: NURSE PRACTITIONER

## 2022-05-01 PROCEDURE — 80053 COMPREHEN METABOLIC PANEL: CPT | Performed by: NURSE PRACTITIONER

## 2022-05-01 PROCEDURE — 85027 COMPLETE CBC AUTOMATED: CPT | Performed by: NURSE PRACTITIONER

## 2022-05-01 PROCEDURE — 81001 URINALYSIS AUTO W/SCOPE: CPT | Performed by: NURSE PRACTITIONER

## 2022-05-01 PROCEDURE — 82553 CREATINE MB FRACTION: CPT | Performed by: NURSE PRACTITIONER

## 2022-05-01 PROCEDURE — 82948 REAGENT STRIP/BLOOD GLUCOSE: CPT

## 2022-05-01 PROCEDURE — 87077 CULTURE AEROBIC IDENTIFY: CPT | Performed by: NURSE PRACTITIONER

## 2022-05-01 PROCEDURE — 82550 ASSAY OF CK (CPK): CPT | Performed by: NURSE PRACTITIONER

## 2022-05-01 PROCEDURE — 93971 EXTREMITY STUDY: CPT | Performed by: SURGERY

## 2022-05-01 PROCEDURE — 87521 HEPATITIS C PROBE&RVRS TRNSC: CPT | Performed by: NURSE PRACTITIONER

## 2022-05-01 PROCEDURE — 87186 SC STD MICRODIL/AGAR DIL: CPT | Performed by: NURSE PRACTITIONER

## 2022-05-01 PROCEDURE — 83605 ASSAY OF LACTIC ACID: CPT | Performed by: INTERNAL MEDICINE

## 2022-05-01 PROCEDURE — 99233 SBSQ HOSP IP/OBS HIGH 50: CPT | Performed by: INTERNAL MEDICINE

## 2022-05-01 RX ORDER — DEXTROSE AND SODIUM CHLORIDE 5; .45 G/100ML; G/100ML
75 INJECTION, SOLUTION INTRAVENOUS CONTINUOUS
Status: DISCONTINUED | OUTPATIENT
Start: 2022-05-01 | End: 2022-05-02

## 2022-05-01 RX ADMIN — POLYETHYLENE GLYCOL 3350 17 G: 17 POWDER, FOR SOLUTION ORAL at 22:52

## 2022-05-01 RX ADMIN — LATANOPROST 1 DROP: 50 SOLUTION OPHTHALMIC at 00:00

## 2022-05-01 RX ADMIN — HYDRALAZINE HYDROCHLORIDE 25 MG: 25 TABLET ORAL at 17:42

## 2022-05-01 RX ADMIN — FLUTICASONE PROPIONATE 1 SPRAY: 50 SPRAY, METERED NASAL at 08:20

## 2022-05-01 RX ADMIN — LATANOPROST 1 DROP: 50 SOLUTION OPHTHALMIC at 22:00

## 2022-05-01 RX ADMIN — DEXTROSE AND SODIUM CHLORIDE 75 ML/HR: 5; .45 INJECTION, SOLUTION INTRAVENOUS at 09:33

## 2022-05-01 RX ADMIN — MULTIPLE VITAMINS W/ MINERALS TAB 1 TABLET: TAB ORAL at 08:18

## 2022-05-01 RX ADMIN — AMLODIPINE BESYLATE 10 MG: 10 TABLET ORAL at 08:18

## 2022-05-01 RX ADMIN — DORZOLAMIDE HYDROCHLORIDE AND TIMOLOL MALEATE 1 DROP: 22.3; 6.8 SOLUTION/ DROPS OPHTHALMIC at 22:01

## 2022-05-01 RX ADMIN — DONEPEZIL HYDROCHLORIDE 10 MG: 5 TABLET, FILM COATED ORAL at 21:58

## 2022-05-01 RX ADMIN — Medication 125 MG: at 08:20

## 2022-05-01 RX ADMIN — DORZOLAMIDE HYDROCHLORIDE AND TIMOLOL MALEATE 1 DROP: 22.3; 6.8 SOLUTION/ DROPS OPHTHALMIC at 08:20

## 2022-05-01 RX ADMIN — HEPARIN SODIUM 5000 UNITS: 5000 INJECTION INTRAVENOUS; SUBCUTANEOUS at 21:59

## 2022-05-01 RX ADMIN — BUPROPION HYDROCHLORIDE 150 MG: 150 TABLET, FILM COATED, EXTENDED RELEASE ORAL at 08:19

## 2022-05-01 RX ADMIN — HEPARIN SODIUM 5000 UNITS: 5000 INJECTION INTRAVENOUS; SUBCUTANEOUS at 15:19

## 2022-05-01 RX ADMIN — ATORVASTATIN CALCIUM 40 MG: 40 TABLET, FILM COATED ORAL at 08:18

## 2022-05-01 RX ADMIN — LORATADINE 5 MG: 10 TABLET ORAL at 08:18

## 2022-05-01 RX ADMIN — RISPERIDONE 0.25 MG: 0.25 TABLET ORAL at 17:42

## 2022-05-01 RX ADMIN — HEPARIN SODIUM 5000 UNITS: 5000 INJECTION INTRAVENOUS; SUBCUTANEOUS at 06:13

## 2022-05-01 RX ADMIN — TRAZODONE HYDROCHLORIDE 50 MG: 50 TABLET ORAL at 21:58

## 2022-05-01 RX ADMIN — ASPIRIN 81 MG: 81 TABLET, COATED ORAL at 08:19

## 2022-05-01 RX ADMIN — SENNOSIDES AND DOCUSATE SODIUM 1 TABLET: 50; 8.6 TABLET ORAL at 21:58

## 2022-05-01 RX ADMIN — MEMANTINE 10 MG: 10 TABLET ORAL at 08:19

## 2022-05-01 RX ADMIN — HYDRALAZINE HYDROCHLORIDE 25 MG: 25 TABLET ORAL at 21:58

## 2022-05-01 RX ADMIN — LOSARTAN POTASSIUM 25 MG: 25 TABLET, FILM COATED ORAL at 08:18

## 2022-05-01 RX ADMIN — PANTOPRAZOLE SODIUM 40 MG: 40 TABLET, DELAYED RELEASE ORAL at 06:13

## 2022-05-01 RX ADMIN — CEFTRIAXONE SODIUM 1000 MG: 10 INJECTION, POWDER, FOR SOLUTION INTRAVENOUS at 08:36

## 2022-05-01 RX ADMIN — CITALOPRAM HYDROBROMIDE 40 MG: 20 TABLET ORAL at 08:19

## 2022-05-01 RX ADMIN — MEMANTINE 10 MG: 10 TABLET ORAL at 17:42

## 2022-05-01 RX ADMIN — INSULIN LISPRO 1 UNITS: 100 INJECTION, SOLUTION INTRAVENOUS; SUBCUTANEOUS at 22:00

## 2022-05-01 RX ADMIN — HYDRALAZINE HYDROCHLORIDE 25 MG: 25 TABLET ORAL at 08:18

## 2022-05-01 RX ADMIN — RISPERIDONE 0.25 MG: 0.25 TABLET ORAL at 08:19

## 2022-05-01 RX ADMIN — FERROUS SULFATE TAB 325 MG (65 MG ELEMENTAL FE) 325 MG: 325 (65 FE) TAB at 08:18

## 2022-05-01 NOTE — H&P
[facility]  H&P- Long Bautista 1945, 77 y o  female MRN: [id number]  Unit/Bed#: S -01 Encounter: [id number]  Primary Care Provider: Ana Cristina Retana MD   Date and time admitted to hospital: 4/30/2022  3:50 PM    * FTT (failure to thrive) in adult  Assessment & Plan  · Currently lives with daughter  Needs assistance with all ADLs  Max assist out of bed  · Will need placement  · PT, OT       Late onset Alzheimer's disease without behavioral disturbance ([facility] [address] )  Assessment & Plan  · Patient is pleasant  Alert to self  Follows commands, moves all extremities  No focal deficits  · Continue PTA meds: aricept, namenda,  · W61, folic acid pending    Elevated CK  Assessment & Plan  Gentle fluid overnight   Recheck in AM    CKD (chronic kidney disease)  Assessment & Plan  Creatinine stable and at baseline  Retention protocol    Essential hypertension  Assessment & Plan  BP stable  PTA: amlodipine 10 mg qd, hydralazine 25 mg TID, losartan 25 mg QD, lasix 10 mg QD   Continue above, hold lasix for now while receiving IVF overnight  Mildly dehydrated with hypercalcemia, also noted to have elevated CK    Gait instability  Assessment & Plan  · Originally presented to ED falling fall  Slide from shower chair to knees  On ASA  No head stroke or LOC per ED  · Chronic gait instability in setting of OA, deconditioning  · Head CT negative  · PT OT  · Fall precaution     Type 2 diabetes mellitus with stage 3b chronic kidney disease and hypertension ([facility]  Assessment & Plan  Update a1c  PTA: tradjenta  Accucheck, SSI    Moderate episode of recurrent major depressive disorder (HCC)  Assessment & Plan  · Continue Wellbutrin, celexa, risperdal, trazodone  · Follows with psychiatry as outpatient    VTE Pharmacologic Prophylaxis: VTE Score: 4 Moderate Risk (Score 3-4) - Pharmacological DVT Prophylaxis Ordered: heparin    Code Status: Level 3 - DNAR and DNI   Discussion with family: Patient declined call to   Anticipated Length of Stay: Patient will be admitted on an inpatient basis with an anticipated length of stay of greater than 2 midnights secondary to FTT, likely STR   Total Time for Visit, including Counseling / Coordination of Care: 70 minutes Greater than 50% of this total time spent on direct patient counseling and coordination of care  Chief Complaint: weakness, fall    History of Present Illness:  Orquidea Ho is a 77 y o  female with a PMH of alzheimer's disease, depression, DM, CKD,  who presents with generalized weakness and slip out of shower chair to her knees  No headstrike occurred today, no LOC  She doesn't offer much reliable information  She denies any pain or discomfort  She is pleasantly confused  She is alert to herself and follows all commands  She has mild ecchymosis to R forehead, family notes fall yesterday with headstrike  Admitted as inpatient  PT OT consulted  Review of Systems:  Review of Systems   Unable to perform ROS: Dementia   Skin: Positive for color change  Hematological: Bruises/bleeds easily  Psychiatric/Behavioral: Positive for confusion  Past Medical and Surgical History:   Past Medical History:   Diagnosis Date    Acute cystitis without hematuria 8/13/2020    Allergic rhinitis 5/18/2021    Anxiety     Arm swelling 8/30/2021    Cholesterol depletion     Confusion     Confusion 5/17/2021    Dementia ([facility] [address] [facility]     Depression     Diabetes ([facility] [address] [facility]     Early onset Alzheimer's dementia ([facility] [address] )     Encounter for screening colonoscopy 9/22/2020    Glaucoma     Hypertension     Hypertension     Memory loss     Right leg swelling 8/30/2021       Past Surgical History:   Procedure Laterality Date    CYSTOSCOPY  06/10/2021    HYSTERECTOMY         Meds/Allergies:  Prior to Admission medications    Medication Sig Start Date End Date Taking?  Authorizing Provider   amLODIPine (NORVASC) 10 mg tablet TAKE 1 TABLET BY MOUTH EVERY DAY 3/29/22   Sang Benitez MD   Ascorbic Acid (VITAMIN C) 100 MG tablet Take 100 mg by mouth daily    Historical Provider, MD   aspirin (ECOTRIN LOW STRENGTH) 81 mg EC tablet Take 81 mg by mouth daily    Historical Provider, MD   atorvastatin (LIPITOR) 40 mg tablet Take 1 tablet (40 mg total) by mouth daily 10/11/21   Nanette Upton MD   Azopt 1 % ophthalmic suspension  10/1/20   Historical Provider, MD   Brimonidine Tartrate-Timolol (COMBIGAN OP) Apply to eye    Historical Provider, MD   buPROPion [facility] SR) 150 mg 12 hr tablet Take 1 tablet (150 mg total) by mouth 2 (two) times a day 10/11/21   Sang Benitez MD   Calcifediol ER (Rayaldee) 30 MCG CPCR Take 30 mcg by mouth daily 8/30/21   Nanette Upton MD   cetirizine (ZyrTEC) 10 mg tablet Take 10 mg by mouth daily    Historical Provider, MD   citalopram (CeleXA) 40 mg tablet Take 1 tablet (40 mg total) by mouth daily 9/21/21   Nanette Upton MD   diclofenac sodium (VOLTAREN) 1 % Apply 2 g topically 3 (three) times a day as needed (Pain) 9/22/20   Marleny Wan DO   donepezil (ARICEPT) 10 mg tablet Take 1 tablet (10 mg total) by mouth daily at bedtime 10/11/21   Sang Benitez MD   dorzolamide (TRUSOPT) 2 % ophthalmic solution 1 drop 3 (three) times a day    Historical Provider, MD   ferrous sulfate 325 (65 Fe) mg tablet Take 325 mg by mouth daily with breakfast    Historical Provider, MD   fluticasone (FLONASE) 50 mcg/act nasal spray INSTILL ONE (1) SPRAY IN EACH NOSTRIL DAILY 3/15/22   Nanette Upton MD   furosemide (LASIX) 20 mg tablet TAKE 1/2 TABLET BY MOUTH EVERY DAY 12/16/21   Nanette Upton MD   hydrALAZINE (APRESOLINE) 25 mg tablet TAKE 1 TABLET BY MOUTH THREE TIMES A DAY 8/30/21   Nanette Upton MD   Incontinence Supply Disposable ([place] Fresh Moist Wipes) MISC Use 6 (six) times a day 8/30/21   Sang Benitez MD   Incontinence Supply Disposable (Depend Underwear Large/XL) MISC Use 6 (six) times a day 7/8/21   Nanette Upton MD   Latanoprostene Bunod (Vyzulta) 0 024 % SOLN Apply to eye    Historical Provider, MD   linaGLIPtin (Tradjenta) 5 MG TABS Take 5 mg by mouth daily 8/30/21   Nanette Upton MD   losartan (COZAAR) 25 mg tablet Take 1 tablet (25 mg total) by mouth daily 8/30/21   Nanette Upton MD   meclizine (ANTIVERT) 25 mg tablet Take 1 tablet (25 mg total) by mouth 2 (two) times a day 9/21/21   Man Alvarado MD   memantine (NAMENDA) 10 mg tablet Take 1 tablet (10 mg total) by mouth 2 (two) times a day 9/21/21   Man Alvarado MD   Multiple Vitamin (Daily-Jr Multivitamin) TABS Take 1 tablet by mouth daily 1/27/22   Historical Provider, MD   Multiple Vitamins-Minerals (Centrum Silver) tablet Take 1 tablet by mouth daily 11/2/21   Nanette Upton MD   omeprazole (PriLOSEC) 20 mg delayed release capsule Take 20 mg by mouth daily    Historical Provider, MD   polyethylene glycol (MIRALAX) 17 g packet Take 17 g by mouth daily as needed (constipation) 8/30/21   Nanette Upton MD   risperiDONE (RisperDAL) 0 25 mg tablet Take 1 tablet (0 25 mg total) by mouth 2 (two) times a day 10/11/21   Man Alvarado MD   senna-docusate sodium (SENOKOT S) 8 6-50 mg per tablet Take 1 tablet by mouth daily at bedtime 8/30/21   Nanette Upton MD   traZODone (DESYREL) 50 mg tablet Take 1 tablet (50 mg total) by mouth daily at bedtime 9/21/21   Man Alvarado MD     I have reviewed home medications with a medical source (PCP, Pharmacy, other)  Allergies:    Allergies   Allergen Reactions    Codeine        Social History:  Marital Status: Single   Occupation:   Patient Pre-hospital Living Situation: Home  Patient Pre-hospital Level of Mobility: max assist oob   Patient Pre-hospital Diet Restrictions:   Substance Use History:   Social History     Substance and Sexual Activity   Alcohol Use Not Currently     Social History     Tobacco Use   Smoking Status Former Smoker    Types: Cigarettes    Quit date: 1990    Years since quittin 7   Smokeless Tobacco Never Used     Social History     Substance and Sexual Activity   Drug Use Never       Family History:  Family History   Problem Relation Age of Onset    Diabetes Mother     Hypertension Mother        Physical Exam:     Vitals:   Blood Pressure: 146/78 (22)  Pulse: 93 (22)  Temperature: 98 6 °F (37 °C) (22 1553)  Temp Source: Oral (22 1553)  Respirations: 18 (22 183)  Weight - Scale: 88 1 kg (194 lb 3 6 oz) (22 1553)  SpO2: 95 % (22)    Physical Exam  Constitutional:       General: She is not in acute distress  Appearance: Normal appearance  She is obese  She is not ill-appearing  HENT:      Head: Normocephalic and atraumatic  Right Ear: External ear normal       Left Ear: External ear normal       Nose: Nose normal       Mouth/Throat:      Mouth: Mucous membranes are moist       Pharynx: Oropharynx is clear  Eyes:      General: No scleral icterus  Extraocular Movements: Extraocular movements intact  Conjunctiva/sclera: Conjunctivae normal    Cardiovascular:      Rate and Rhythm: Normal rate and regular rhythm  Pulses: Normal pulses  Heart sounds: Normal heart sounds  Pulmonary:      Effort: Pulmonary effort is normal       Breath sounds: Normal breath sounds  Abdominal:      General: Bowel sounds are normal       Palpations: Abdomen is soft  Musculoskeletal:         General: Normal range of motion  Cervical back: Normal range of motion  No rigidity or tenderness  Right lower leg: Edema (trace pitting) present  Left lower leg: Edema (non pitting) present  Skin:     General: Skin is warm and dry  Capillary Refill: Capillary refill takes less than 2 seconds  Findings: Bruising (r forehead) present  Neurological:      General: No focal deficit present  Mental Status: She is alert  Mental status is at baseline  Comments: Alert to self, pleasant  Psychiatric:         Mood and Affect: Mood normal          Behavior: Behavior normal           Additional Data:     Lab Results:  Results from last 7 days   Lab Units 04/30/22  1711   WBC Thousand/uL 10 79*   HEMOGLOBIN g/dL 12 7   HEMATOCRIT % 41 0   PLATELETS Thousands/uL 276   NEUTROS PCT % 84*   LYMPHS PCT % 6*   MONOS PCT % 8   EOS PCT % 0     Results from last 7 days   Lab Units 04/30/22  1711   SODIUM mmol/L 136   POTASSIUM mmol/L 4 5   CHLORIDE mmol/L 102   CO2 mmol/L 20*   BUN mg/dL 22   CREATININE mg/dL 1 93*   ANION GAP mmol/L 14*   CALCIUM mg/dL 10 1   ALBUMIN g/dL 3 1*   TOTAL BILIRUBIN mg/dL 0 64   ALK PHOS U/L 127*   ALT U/L 30   AST U/L 37   GLUCOSE RANDOM mg/dL 154*     Results from last 7 days   Lab Units 04/30/22  1818   INR  0 94                   Imaging: Reviewed radiology reports from this admission including: CT head and Lower extremity duplex  CT head without contrast   Final Result by Riri Hamlin MD (04/30 1842)      No acute intracranial abnormality  Workstation performed: [id number]         VAS lower limb venous duplex study, unilateral/limited   ED Interpretation by Wendy Orozco MD (04/30 1702)   Negative for DVT  EKG and Other Studies Reviewed on Admission:   · EKG: NSR  HR 95     ** Please Note: This note has been constructed using a voice recognition system   **

## 2022-05-01 NOTE — ASSESSMENT & PLAN NOTE
BP stable  PTA: amlodipine 10 mg qd, hydralazine 25 mg TID, losartan 25 mg QD, lasix 10 mg QD   Continue above, hold lasix for now while receiving IVF overnight    Mildly dehydrated with hypercalcemia, also noted to have elevated CK

## 2022-05-01 NOTE — PLAN OF CARE
Problem: MOBILITY - ADULT  Goal: Maintain or return to baseline ADL function  Description: INTERVENTIONS:  -  Assess patient's ability to carry out ADLs; assess patient's baseline for ADL function and identify physical deficits which impact ability to perform ADLs (bathing, care of mouth/teeth, toileting, grooming, dressing, etc )  - Assess/evaluate cause of self-care deficits   - Assess range of motion  - Assess patient's mobility; develop plan if impaired  - Assess patient's need for assistive devices and provide as appropriate  - Encourage maximum independence but intervene and supervise when necessary  - Involve family in performance of ADLs  - Assess for home care needs following discharge   - Consider OT consult to assist with ADL evaluation and planning for discharge  - Provide patient education as appropriate  Outcome: Progressing  Goal: Maintains/Returns to pre admission functional level  Description: INTERVENTIONS:  - Perform BMAT or MOVE assessment daily    - Set and communicate daily mobility goal to care team and patient/family/caregiver  - Collaborate with rehabilitation services on mobility goals if consulted  - Perform Range of Motion 4 times a day  - Reposition patient every 2 hours    - Dangle patient 4 times a day  - Stand patient 4 times a day  - Ambulate patient 4 times a day  - Out of bed to chair 4 times a day   - Out of bed for meals 4 times a day  - Out of bed for toileting  - Record patient progress and toleration of activity level   Outcome: Progressing     Problem: Potential for Falls  Goal: Patient will remain free of falls  Description: INTERVENTIONS:  - Educate patient/family on patient safety including physical limitations  - Instruct patient to call for assistance with activity   - Consult OT/PT to assist with strengthening/mobility   - Keep Call bell within reach  - Keep bed low and locked with side rails adjusted as appropriate  - Keep care items and personal belongings within reach  - Initiate and maintain comfort rounds  - Make Fall Risk Sign visible to staff  - Offer Toileting every 2 Hours, in advance of need  - Initiate/Maintain bed alarm  - Obtain necessary fall risk management equipment:  bed alarm  - Apply yellow socks and bracelet for high fall risk patients  - Consider moving patient to room near nurses station  Outcome: Progressing     Problem: Prexisting or High Potential for Compromised Skin Integrity  Goal: Skin integrity is maintained or improved  Description: INTERVENTIONS:  - Identify patients at risk for skin breakdown  - Assess and monitor skin integrity  - Assess and monitor nutrition and hydration status  - Monitor labs   - Assess for incontinence   - Turn and reposition patient  - Assist with mobility/ambulation  - Relieve pressure over bony prominences  - Avoid friction and shearing  - Provide appropriate hygiene as needed including keeping skin clean and dry  - Evaluate need for skin moisturizer/barrier cream  - Collaborate with interdisciplinary team   - Patient/family teaching  - Consider wound care consult   Outcome: Progressing     Problem: PAIN - ADULT  Goal: Verbalizes/displays adequate comfort level or baseline comfort level  Description: Interventions:  - Encourage patient to monitor pain and request assistance  - Assess pain using appropriate pain scale  - Administer analgesics based on type and severity of pain and evaluate response  - Implement non-pharmacological measures as appropriate and evaluate response  - Consider cultural and social influences on pain and pain management  - Notify physician/advanced practitioner if interventions unsuccessful or patient reports new pain  Outcome: Progressing     Problem: INFECTION - ADULT  Goal: Absence or prevention of progression during hospitalization  Description: INTERVENTIONS:  - Assess and monitor for signs and symptoms of infection  - Monitor lab/diagnostic results  - Monitor all insertion sites, i e  indwelling lines, tubes, and drains  - Monitor endotracheal if appropriate and nasal secretions for changes in amount and color  - Lenora appropriate cooling/warming therapies per order  - Administer medications as ordered  - Instruct and encourage patient and family to use good hand hygiene technique  - Identify and instruct in appropriate isolation precautions for identified infection/condition  Outcome: Progressing  Goal: Absence of fever/infection during neutropenic period  Description: INTERVENTIONS:  - Monitor WBC    Outcome: Progressing     Problem: SAFETY ADULT  Goal: Maintain or return to baseline ADL function  Description: INTERVENTIONS:  -  Assess patient's ability to carry out ADLs; assess patient's baseline for ADL function and identify physical deficits which impact ability to perform ADLs (bathing, care of mouth/teeth, toileting, grooming, dressing, etc )  - Assess/evaluate cause of self-care deficits   - Assess range of motion  - Assess patient's mobility; develop plan if impaired  - Assess patient's need for assistive devices and provide as appropriate  - Encourage maximum independence but intervene and supervise when necessary  - Involve family in performance of ADLs  - Assess for home care needs following discharge   - Consider OT consult to assist with ADL evaluation and planning for discharge  - Provide patient education as appropriate  Outcome: Progressing  Goal: Maintains/Returns to pre admission functional level  Description: INTERVENTIONS:  - Perform BMAT or MOVE assessment daily    - Set and communicate daily mobility goal to care team and patient/family/caregiver  - Collaborate with rehabilitation services on mobility goals if consulted  - Perform Range of Motion 4 times a day  - Reposition patient every 2 hours    - Dangle patient 4 times a day  - Stand patient 4 times a day  - Ambulate patient 4 times a day  - Out of bed to chair 4 times a day   - Out of bed for meals 4 times a day  - Out of bed for toileting  - Record patient progress and toleration of activity level   Outcome: Progressing  Goal: Patient will remain free of falls  Description: INTERVENTIONS:  - Educate patient/family on patient safety including physical limitations  - Instruct patient to call for assistance with activity   - Consult OT/PT to assist with strengthening/mobility   - Keep Call bell within reach  - Keep bed low and locked with side rails adjusted as appropriate  - Keep care items and personal belongings within reach  - Initiate and maintain comfort rounds  - Make Fall Risk Sign visible to staff  - Offer Toileting every 2 Hours, in advance of need  - Initiate/Maintain bed alarm  - Obtain necessary fall risk management equipment:  bed alarm  - Apply yellow socks and bracelet for high fall risk patients  - Consider moving patient to room near nurses station  Outcome: Progressing     Problem: DISCHARGE PLANNING  Goal: Discharge to home or other facility with appropriate resources  Description: INTERVENTIONS:  - Identify barriers to discharge w/patient and caregiver  - Arrange for needed discharge resources and transportation as appropriate  - Identify discharge learning needs (meds, wound care, etc )    Problem: GENITOURINARY - ADULT  Goal: Absence of urinary retention  Description: INTERVENTIONS:  - Assess patients ability to void and empty bladder  - Monitor I/O  - Bladder scan as needed  - Discuss with physician/AP medications to alleviate retention as needed  - Discuss catheterization for long term situations as appropriate  Outcome: Progressing     Problem: METABOLIC, FLUID AND ELECTROLYTES - ADULT  Goal: Electrolytes maintained within normal limits  Description: INTERVENTIONS:  - Monitor labs and assess patient for signs and symptoms of electrolyte imbalances  - Administer electrolyte replacement as ordered  - Monitor response to electrolyte replacements, including repeat lab results as appropriate  - Instruct patient on fluid and nutrition as appropriate  Outcome: Progressing  Goal: Fluid balance maintained  Description: INTERVENTIONS:  - Monitor labs   - Monitor I/O and WT  - Instruct patient on fluid and nutrition as appropriate  - Assess for signs & symptoms of volume excess or deficit  Outcome: Progressing  Goal: Glucose maintained within target range  Description: INTERVENTIONS:  - Monitor Blood Glucose as ordered  - Assess for signs and symptoms of hyperglycemia and hypoglycemia  - Administer ordered medications to maintain glucose within target range  - Assess nutritional intake and initiate nutrition service referral as needed  Outcome: Progressing   - Refer to Case Management Department for coordinating discharge planning if the patient needs post-hospital services based on physician/advanced practitioner order or complex needs related to functional status, cognitive ability, or social support system  Outcome: Progressing

## 2022-05-01 NOTE — QUICK NOTE
Family notes pt currently on PO abx for UTI   UA with innumerable bacteria  Does not meet sirs  Send urine culture  Start rocephin

## 2022-05-01 NOTE — ASSESSMENT & PLAN NOTE
· Originally presented to ED falling fall  Slide from shower chair to knees  On ASA  No head stroke or LOC per ED  · Chronic gait instability in setting of OA, deconditioning     · Head CT negative  · PT OT  · Fall precaution

## 2022-05-01 NOTE — ASSESSMENT & PLAN NOTE
HbA1c is 5 8  Lab Results   Component Value Date    HGBA1C 5 8 (H) 04/30/2022   On Crayton Gaucher as outpatient     Accucheck, SSI

## 2022-05-01 NOTE — ASSESSMENT & PLAN NOTE
· Currently lives with daughter  Needs assistance with all ADLs  Max assist out of bed  · Will need placement     · PT, OT

## 2022-05-01 NOTE — PLAN OF CARE
Problem: MOBILITY - ADULT  Goal: Maintain or return to baseline ADL function  Description: INTERVENTIONS:  -  Assess patient's ability to carry out ADLs; assess patient's baseline for ADL function and identify physical deficits which impact ability to perform ADLs (bathing, care of mouth/teeth, toileting, grooming, dressing, etc )  - Assess/evaluate cause of self-care deficits   - Assess range of motion  - Assess patient's mobility; develop plan if impaired  - Assess patient's need for assistive devices and provide as appropriate  - Encourage maximum independence but intervene and supervise when necessary  - Involve family in performance of ADLs  - Assess for home care needs following discharge   - Consider OT consult to assist with ADL evaluation and planning for discharge  - Provide patient education as appropriate  Outcome: Progressing  Goal: Maintains/Returns to pre admission functional level  Description: INTERVENTIONS:  - Perform BMAT or MOVE assessment daily    - Set and communicate daily mobility goal to care team and patient/family/caregiver     - Collaborate with rehabilitation services on mobility goals if consulted    - Out of bed for toileting  - Record patient progress and toleration of activity level   Outcome: Progressing     Problem: Potential for Falls  Goal: Patient will remain free of falls  Description: INTERVENTIONS:  - Educate patient/family on patient safety including physical limitations  - Instruct patient to call for assistance with activity   - Consult OT/PT to assist with strengthening/mobility   - Keep Call bell within reach  - Keep bed low and locked with side rails adjusted as appropriate  - Keep care items and personal belongings within reach  - Initiate and maintain comfort rounds  - Make Fall Risk Sign visible to staff    - Apply yellow socks and bracelet for high fall risk patients  - Consider moving patient to room near nurses station  Outcome: Progressing     Problem: Prexisting or High Potential for Compromised Skin Integrity  Goal: Skin integrity is maintained or improved  Description: INTERVENTIONS:  - Identify patients at risk for skin breakdown  - Assess and monitor skin integrity  - Assess and monitor nutrition and hydration status  - Monitor labs   - Assess for incontinence   - Turn and reposition patient  - Assist with mobility/ambulation  - Relieve pressure over bony prominences  - Avoid friction and shearing  - Provide appropriate hygiene as needed including keeping skin clean and dry  - Evaluate need for skin moisturizer/barrier cream  - Collaborate with interdisciplinary team   - Patient/family teaching  - Consider wound care consult   Outcome: Progressing     Problem: PAIN - ADULT  Goal: Verbalizes/displays adequate comfort level or baseline comfort level  Description: Interventions:  - Encourage patient to monitor pain and request assistance  - Assess pain using appropriate pain scale  - Administer analgesics based on type and severity of pain and evaluate response  - Implement non-pharmacological measures as appropriate and evaluate response  - Consider cultural and social influences on pain and pain management  - Notify physician/advanced practitioner if interventions unsuccessful or patient reports new pain  Outcome: Progressing     Problem: INFECTION - ADULT  Goal: Absence or prevention of progression during hospitalization  Description: INTERVENTIONS:  - Assess and monitor for signs and symptoms of infection  - Monitor lab/diagnostic results  - Monitor all insertion sites, i e  indwelling lines, tubes, and drains  - Monitor endotracheal if appropriate and nasal secretions for changes in amount and color  - Lynn appropriate cooling/warming therapies per order  - Administer medications as ordered  - Instruct and encourage patient and family to use good hand hygiene technique  - Identify and instruct in appropriate isolation precautions for identified infection/condition  Outcome: Progressing  Goal: Absence of fever/infection during neutropenic period  Description: INTERVENTIONS:  - Monitor WBC    Outcome: Progressing     Problem: SAFETY ADULT  Goal: Maintain or return to baseline ADL function  Description: INTERVENTIONS:  -  Assess patient's ability to carry out ADLs; assess patient's baseline for ADL function and identify physical deficits which impact ability to perform ADLs (bathing, care of mouth/teeth, toileting, grooming, dressing, etc )  - Assess/evaluate cause of self-care deficits   - Assess range of motion  - Assess patient's mobility; develop plan if impaired  - Assess patient's need for assistive devices and provide as appropriate  - Encourage maximum independence but intervene and supervise when necessary  - Involve family in performance of ADLs  - Assess for home care needs following discharge   - Consider OT consult to assist with ADL evaluation and planning for discharge  - Provide patient education as appropriate  Outcome: Progressing  Goal: Maintains/Returns to pre admission functional level  Description: INTERVENTIONS:  - Perform BMAT or MOVE assessment daily    - Set and communicate daily mobility goal to care team and patient/family/caregiver     - Collaborate with rehabilitation services on mobility goals if consulted    - Out of bed for toileting  - Record patient progress and toleration of activity level   Outcome: Progressing  Goal: Patient will remain free of falls  Description: INTERVENTIONS:  - Educate patient/family on patient safety including physical limitations  - Instruct patient to call for assistance with activity   - Consult OT/PT to assist with strengthening/mobility   - Keep Call bell within reach  - Keep bed low and locked with side rails adjusted as appropriate  - Keep care items and personal belongings within reach  - Initiate and maintain comfort rounds  - Make Fall Risk Sign visible to staff    - Apply yellow socks and bracelet for high fall risk patients  - Consider moving patient to room near nurses station  Outcome: Progressing     Problem: DISCHARGE PLANNING  Goal: Discharge to home or other facility with appropriate resources  Description: INTERVENTIONS:  - Identify barriers to discharge w/patient and caregiver  - Arrange for needed discharge resources and transportation as appropriate  - Identify discharge learning needs (meds, wound care, etc )  - Arrange for interpretive services to assist at discharge as needed  - Refer to Case Management Department for coordinating discharge planning if the patient needs post-hospital services based on physician/advanced practitioner order or complex needs related to functional status, cognitive ability, or social support system  Outcome: Progressing     Problem: GENITOURINARY - ADULT  Goal: Absence of urinary retention  Description: INTERVENTIONS:  - Assess patients ability to void and empty bladder  - Monitor I/O  - Bladder scan as needed  - Discuss with physician/AP medications to alleviate retention as needed  - Discuss catheterization for long term situations as appropriate  Outcome: Progressing     Problem: METABOLIC, FLUID AND ELECTROLYTES - ADULT  Goal: Electrolytes maintained within normal limits  Description: INTERVENTIONS:  - Monitor labs and assess patient for signs and symptoms of electrolyte imbalances  - Administer electrolyte replacement as ordered  - Monitor response to electrolyte replacements, including repeat lab results as appropriate  - Instruct patient on fluid and nutrition as appropriate  Outcome: Progressing  Goal: Fluid balance maintained  Description: INTERVENTIONS:  - Monitor labs   - Monitor I/O and WT  - Instruct patient on fluid and nutrition as appropriate  - Assess for signs & symptoms of volume excess or deficit  Outcome: Progressing  Goal: Glucose maintained within target range  Description: INTERVENTIONS:  - Monitor Blood Glucose as ordered  - Assess for signs and symptoms of hyperglycemia and hypoglycemia  - Administer ordered medications to maintain glucose within target range  - Assess nutritional intake and initiate nutrition service referral as needed  Outcome: Progressing

## 2022-05-01 NOTE — PROGRESS NOTES
[facility]  Progress Note - Reanna Garner 1945, 77 y o  female MRN: [id number]  Unit/Bed#: S -01 Encounter: [id number]  Primary Care Provider: Imer Ramirez MD   Date and time admitted to hospital: 4/30/2022  3:50 PM    * FTT (failure to thrive) in adult  Assessment & Plan  · Currently lives with daughter  Needs assistance with all ADLs  Max assist out of bed  · Will need placement  · PT, OT   · Gentle Hydration,       Non-traumatic rhabdomyolysis  Assessment & Plan  Gentle fluid hydration  Gait instability  Assessment & Plan  · Originally presented to ED falling fall  Slide from shower chair to knees  On ASA  No head stroke or LOC per ED  · Chronic gait instability in setting of OA, deconditioning  · Head CT negative  · PT OT  · Fall precaution     Late onset [facility]'s [facility] without [facility] )  Assessment & Plan  · Patient is pleasant  Alert to self  Follows commands, moves all extremities  No focal deficits  · Continue PTA meds: aricept, namenda,  · X12, folic acid pending    CKD (chronic kidney disease)  Assessment & Plan  Creatinine stable and at baseline  Retention protocol    Essential hypertension  Assessment & Plan  BP stable  PTA: Amlodipine 10 mg qd, hydralazine 25 mg TID, Losartan 25 mg QD, lasix 10 mg QD   Continue above, Hold Lasix for now while Receiving IVF overnight  Mildly dehydrated with hypercalcemia, also noted to have Elevated CK    Moderate episode of recurrent major depressive disorder (HCC)  Assessment & Plan  · Continue Wellbutrin, celexa, risperdal, trazodone  · Follows with psychiatry as outpatient    Type 2 diabetes mellitus with stage 3b chronic kidney disease and hypertension ([facility] )  Assessment & Plan  HbA1c is 5 8  Lab Results   Component Value Date    HGBA1C 5 8 (H) 04/30/2022   On Fela Pin as outpatient     Accucheck, SSI      Pharmacologic VTE Prophylaxis: Yes   Mechanical VTE Prophylaxis in Place: No   Patient Centered Rounds: I have performed bedside rounds with the Nursing staff today  Current Length of Stay: 1 day(s)  Current Patient Status: Inpatient   Code Status: Level 3 - DNAR and DNI  Time Spent for Care:  35 minutes  More than 50% of total time spent on counseling and coordination of care as described above  Discussions with Specialists or Other Care Team Provider: Yes  Education and Discussions with Family / Patient: Yes, Updated family member  Daughter over the phone  Discharge Plan: 24-48 hrs as per Hospital Course  Certification Statement: The patient will continue to require additional inpatient hospital stay due to Deconditioning, Failure to thrive, Amb dysfunction  Subjective:   I have seen and Examined the patient at the bedside  No CP or Sob  No fevers or chills, No nausea or vomiting  Overnight events reviewed with the RN  No Other complains  Patient's appetite is still not best, and very poor oral intake and fluid intake  No nausea or any vomiting  Denies any abdominal pain or any urinary symptoms  Review of System:   Denies any CP or SOB  Denies any Cough or Cold  Denies any Fevers or chills  Denies any focal tingling numbness or weakness in any extremities  Denies any abdominal pain, Nausea or vomiting  Objective:   Temp (24hrs), Av 7 °F (37 1 °C), Min:98 4 °F (36 9 °C), Max:99 2 °F (37 3 °C)    Temp:  [98 4 °F (36 9 °C)-99 2 °F (37 3 °C)] 98 5 °F (36 9 °C)  HR:  [71-98] 71  Resp:  [16-18] 16  BP: (123-163)/(70-83) 123/70  SpO2:  [90 %-99 %] 96 %  Body mass index is 31 26 kg/m²  Input and Output Summary (last 24 hours):      Intake/Output Summary (Last 24 hours) at 2022 1524  Last data filed at 2022 0837  Gross per 24 hour   Intake 505 83 ml   Output 150 ml   Net 355 83 ml     I/O        0701   0700  0701   0700  0701   0700    I V  (mL/kg)  75 8 (0 9) 430 (5)    Total Intake(mL/kg)  75 8 (0 9) 430 (5)    Urine (mL/kg/hr)  150     Total Output  150     Net  -74 2 +430           Unmeasured Urine Occurrence  1 x           Physical Exam:   General : Alert, Awake and oriented x 2, NAD  Chronically ill looking  Neck : Supple  Eyes:  ARCHANA, EOMI  CVS : S1, S2, RRR    R/S : Clear to auscultate anteriorly  No rales rhonchi or wheezes heard  Abd: Soft, NT, ND  Bs+ve  Extremity: No pedal edema noted  Skin: No acute Rash noted  CNS: No acute FND  Additional Data:     Labs, Culture & Imaging Data Reviewed:    Results from last 7 days   Lab Units 05/01/22  0805   WBC Thousand/uL 6 49   HEMOGLOBIN g/dL 11 0*   HEMATOCRIT % 35 1   PLATELETS Thousands/uL 260     Results from last 7 days   Lab Units 05/01/22  0805   POTASSIUM mmol/L 4 0   CHLORIDE mmol/L 108   CO2 mmol/L 23   BUN mg/dL 20   CREATININE mg/dL 1 59*   CALCIUM mg/dL 9 3   ALK PHOS U/L 106   ALT U/L 28   AST U/L 33     Results from last 7 days   Lab Units 04/30/22  1818   INR  0 94     Lab Results   Component Value Date    HGBA1C 5 8 (H) 04/30/2022      CT head without contrast   Final Result by Gracy Nj MD (04/30 1842)      No acute intracranial abnormality  Workstation performed: [id number]         VAS lower limb venous duplex study, unilateral/limited   ED Interpretation by Sarah Conde MD (04/30 1702)   Negative for DVT  Final Result by Shari Saba DO (05/01 0872)          Cultures:   Blood Culture:   Lab Results   Component Value Date    BLOODCX No Growth After 5 Days  06/18/2021    BLOODCX No Growth After 5 Days   06/18/2021     Urine Culture:   Lab Results   Component Value Date    URINECX >100,000 cfu/ml Escherichia coli (A) 02/09/2022    URINECX 70,000-79,000 cfu/ml  02/09/2022     Sputum Culture: No components found for: SPUTUMCX  Wound Culture: No results found for: WOUNDCULT    Last 24 Hours Medication List:   Current Facility-Administered Medications   Medication Dose Route Frequency Provider Last Rate    acetaminophen  650 mg Oral Q6H PRN Maddy Abhinav, CRNP      amLODIPine  10 mg Oral Daily Maddy Abhinav, [address]      ascorbic acid  125 mg Oral Daily Maddy Abhinav, CRNP      aspirin  81 mg Oral Daily Maddy Abhinav, [address]      buPROPion  150 mg Oral Daily Maddy Abhinav, [address]      cefTRIAXone  1,000 mg Intravenous Q24H Maddy Abhinav, CRNP 1,000 mg (05/01/22 0836)    citalopram  40 mg Oral Daily Maddy Abhinav, DUCNP      dextrose 5 % and sodium chloride 0 45 %  75 mL/hr Intravenous Continuous Cheikh Interiano MD 75 mL/hr (05/01/22 0977)    donepezil  10 mg Oral HS Maddy Abhinav, CRJOSE      dorzolamide-timolol  1 drop Both Eyes Q12H [facility] Maddy Abhinav, NESSA      ferrous sulfate  325 mg Oral Daily With Breakfast Maddy Abhinav, NESSA      fluticasone  1 spray Each Nare Daily Maddy Abhinav, NESSA      heparin (porcine)  5,000 Units Subcutaneous [facility] Maddy Abhinav, [address]      hydrALAZINE  25 mg Oral TID Maddy Abhinav, DUCNP      insulin lispro  1-5 Units Subcutaneous HS Maddy Abhinav, NESSA      insulin lispro  1-6 Units Subcutaneous TID [facility] Maddy Abhinav, NESSA      latanoprost  1 drop Both Eyes HS Maddy Abhinav, DUCNP      loratadine  5 mg Oral Daily Maddy Abhinav, CRNP      losartan  25 mg Oral Daily Maddy Abhinav, [address]      meclizine  25 mg Oral Q12H PRN Maddy Abhinav, CRNP      memantine  10 mg Oral BID Maddy Abhinav, CRNP      multivitamin-minerals  1 tablet Oral Daily Maddy Abhianv, [address]      pantoprazole  40 mg Oral Early Morning Maddy Abhinav, DUCNP      polyethylene glycol  17 g Oral Daily PRN Maddy Abhinav, CRNP      risperiDONE  0 25 mg Oral BID Maddy Abhinav, NESSA      senna-docusate sodium  1 tablet Oral HS Maddy Abhinav, CRNP      traZODone  50 mg Oral HS Maddy Abhinav, CRNP         Patient is at moderate risk for morbidity and mortality due to above mentioned illness and comorbidities

## 2022-05-01 NOTE — ASSESSMENT & PLAN NOTE
· Currently lives with daughter  Needs assistance with all ADLs  Max assist out of bed  · Will need placement     · PT, OT   · Gentle Hydration,

## 2022-05-01 NOTE — NURSING NOTE
Unable to place power glide X 2 attempts, will leave a note for VAS team to try again tomorrow  RN made aware

## 2022-05-01 NOTE — ASSESSMENT & PLAN NOTE
· Patient is pleasant  Alert to self  Follows commands, moves all extremities  No focal deficits    · Continue PTA meds: aricept, namenda,  · U80, folic acid pending

## 2022-05-01 NOTE — ASSESSMENT & PLAN NOTE
· Patient is pleasant  Alert to self  Follows commands, moves all extremities  No focal deficits    · Continue PTA meds: aricept, namenda,  · Y66, folic acid pending

## 2022-05-02 PROBLEM — R82.90 ABNORMAL URINALYSIS: Status: ACTIVE | Noted: 2022-05-02

## 2022-05-02 LAB
ALBUMIN SERPL BCP-MCNC: 2.1 G/DL (ref 3.5–5)
ALP SERPL-CCNC: 95 U/L (ref 46–116)
ALT SERPL W P-5'-P-CCNC: 25 U/L (ref 12–78)
ANION GAP SERPL CALCULATED.3IONS-SCNC: 10 MMOL/L (ref 4–13)
AST SERPL W P-5'-P-CCNC: 55 U/L (ref 5–45)
ATRIAL RATE: 95 BPM
BASOPHILS # BLD AUTO: 0.03 THOUSANDS/ΜL (ref 0–0.1)
BASOPHILS NFR BLD AUTO: 1 % (ref 0–1)
BILIRUB SERPL-MCNC: 0.44 MG/DL (ref 0.2–1)
BUN SERPL-MCNC: 24 MG/DL (ref 5–25)
CALCIUM ALBUM COR SERPL-MCNC: 10.4 MG/DL (ref 8.3–10.1)
CALCIUM SERPL-MCNC: 8.9 MG/DL (ref 8.3–10.1)
CHLORIDE SERPL-SCNC: 106 MMOL/L (ref 100–108)
CK MB SERPL-MCNC: 1 NG/ML (ref 0–5)
CK MB SERPL-MCNC: <1 % (ref 0–2.5)
CK SERPL-CCNC: 258 U/L (ref 26–192)
CO2 SERPL-SCNC: 21 MMOL/L (ref 21–32)
CREAT SERPL-MCNC: 1.73 MG/DL (ref 0.6–1.3)
EOSINOPHIL # BLD AUTO: 0.08 THOUSAND/ΜL (ref 0–0.61)
EOSINOPHIL NFR BLD AUTO: 1 % (ref 0–6)
ERYTHROCYTE [DISTWIDTH] IN BLOOD BY AUTOMATED COUNT: 16.2 % (ref 11.6–15.1)
GFR SERPL CREATININE-BSD FRML MDRD: 28 ML/MIN/1.73SQ M
GLUCOSE SERPL-MCNC: 123 MG/DL (ref 65–140)
GLUCOSE SERPL-MCNC: 124 MG/DL (ref 65–140)
GLUCOSE SERPL-MCNC: 127 MG/DL (ref 65–140)
GLUCOSE SERPL-MCNC: 137 MG/DL (ref 65–140)
GLUCOSE SERPL-MCNC: 145 MG/DL (ref 65–140)
HCT VFR BLD AUTO: 34.1 % (ref 34.8–46.1)
HGB BLD-MCNC: 10.4 G/DL (ref 11.5–15.4)
IMM GRANULOCYTES # BLD AUTO: 0.04 THOUSAND/UL (ref 0–0.2)
IMM GRANULOCYTES NFR BLD AUTO: 1 % (ref 0–2)
LYMPHOCYTES # BLD AUTO: 1.24 THOUSANDS/ΜL (ref 0.6–4.47)
LYMPHOCYTES NFR BLD AUTO: 19 % (ref 14–44)
MCH RBC QN AUTO: 26.1 PG (ref 26.8–34.3)
MCHC RBC AUTO-ENTMCNC: 30.5 G/DL (ref 31.4–37.4)
MCV RBC AUTO: 86 FL (ref 82–98)
MONOCYTES # BLD AUTO: 0.72 THOUSAND/ΜL (ref 0.17–1.22)
MONOCYTES NFR BLD AUTO: 11 % (ref 4–12)
NEUTROPHILS # BLD AUTO: 4.5 THOUSANDS/ΜL (ref 1.85–7.62)
NEUTS SEG NFR BLD AUTO: 67 % (ref 43–75)
NRBC BLD AUTO-RTO: 0 /100 WBCS
P AXIS: 55 DEGREES
PLATELET # BLD AUTO: 296 THOUSANDS/UL (ref 149–390)
PMV BLD AUTO: 10.6 FL (ref 8.9–12.7)
POTASSIUM SERPL-SCNC: 4.9 MMOL/L (ref 3.5–5.3)
PR INTERVAL: 156 MS
PROT SERPL-MCNC: 5.8 G/DL (ref 6.4–8.2)
QRS AXIS: 1 DEGREES
QRSD INTERVAL: 78 MS
QT INTERVAL: 340 MS
QTC INTERVAL: 427 MS
RBC # BLD AUTO: 3.99 MILLION/UL (ref 3.81–5.12)
SODIUM SERPL-SCNC: 137 MMOL/L (ref 136–145)
T WAVE AXIS: 62 DEGREES
VENTRICULAR RATE: 95 BPM
WBC # BLD AUTO: 6.61 THOUSAND/UL (ref 4.31–10.16)

## 2022-05-02 PROCEDURE — 82948 REAGENT STRIP/BLOOD GLUCOSE: CPT

## 2022-05-02 PROCEDURE — 97167 OT EVAL HIGH COMPLEX 60 MIN: CPT

## 2022-05-02 PROCEDURE — 85025 COMPLETE CBC W/AUTO DIFF WBC: CPT | Performed by: INTERNAL MEDICINE

## 2022-05-02 PROCEDURE — 80053 COMPREHEN METABOLIC PANEL: CPT | Performed by: INTERNAL MEDICINE

## 2022-05-02 PROCEDURE — 97163 PT EVAL HIGH COMPLEX 45 MIN: CPT

## 2022-05-02 PROCEDURE — 82550 ASSAY OF CK (CPK): CPT | Performed by: PHYSICIAN ASSISTANT

## 2022-05-02 PROCEDURE — 93010 ELECTROCARDIOGRAM REPORT: CPT | Performed by: INTERNAL MEDICINE

## 2022-05-02 PROCEDURE — 99232 SBSQ HOSP IP/OBS MODERATE 35: CPT | Performed by: PHYSICIAN ASSISTANT

## 2022-05-02 PROCEDURE — 82553 CREATINE MB FRACTION: CPT | Performed by: PHYSICIAN ASSISTANT

## 2022-05-02 RX ORDER — DEXTROSE AND SODIUM CHLORIDE 5; .45 G/100ML; G/100ML
100 INJECTION, SOLUTION INTRAVENOUS CONTINUOUS
Status: DISCONTINUED | OUTPATIENT
Start: 2022-05-02 | End: 2022-05-02

## 2022-05-02 RX ADMIN — PANTOPRAZOLE SODIUM 40 MG: 40 TABLET, DELAYED RELEASE ORAL at 06:26

## 2022-05-02 RX ADMIN — TRAZODONE HYDROCHLORIDE 50 MG: 50 TABLET ORAL at 21:01

## 2022-05-02 RX ADMIN — AMLODIPINE BESYLATE 10 MG: 10 TABLET ORAL at 08:25

## 2022-05-02 RX ADMIN — MEMANTINE 10 MG: 10 TABLET ORAL at 17:23

## 2022-05-02 RX ADMIN — BUPROPION HYDROCHLORIDE 150 MG: 150 TABLET, FILM COATED, EXTENDED RELEASE ORAL at 08:24

## 2022-05-02 RX ADMIN — LOSARTAN POTASSIUM 25 MG: 25 TABLET, FILM COATED ORAL at 08:25

## 2022-05-02 RX ADMIN — HEPARIN SODIUM 5000 UNITS: 5000 INJECTION INTRAVENOUS; SUBCUTANEOUS at 21:02

## 2022-05-02 RX ADMIN — HYDRALAZINE HYDROCHLORIDE 25 MG: 25 TABLET ORAL at 08:24

## 2022-05-02 RX ADMIN — DORZOLAMIDE HYDROCHLORIDE AND TIMOLOL MALEATE 1 DROP: 22.3; 6.8 SOLUTION/ DROPS OPHTHALMIC at 21:57

## 2022-05-02 RX ADMIN — HYDRALAZINE HYDROCHLORIDE 25 MG: 25 TABLET ORAL at 17:22

## 2022-05-02 RX ADMIN — DORZOLAMIDE HYDROCHLORIDE AND TIMOLOL MALEATE 1 DROP: 22.3; 6.8 SOLUTION/ DROPS OPHTHALMIC at 08:26

## 2022-05-02 RX ADMIN — LATANOPROST 1 DROP: 50 SOLUTION OPHTHALMIC at 21:08

## 2022-05-02 RX ADMIN — MEMANTINE 10 MG: 10 TABLET ORAL at 08:24

## 2022-05-02 RX ADMIN — ASPIRIN 81 MG: 81 TABLET, COATED ORAL at 08:25

## 2022-05-02 RX ADMIN — MULTIPLE VITAMINS W/ MINERALS TAB 1 TABLET: TAB ORAL at 08:26

## 2022-05-02 RX ADMIN — DEXTROSE AND SODIUM CHLORIDE 100 ML/HR: 5; .45 INJECTION, SOLUTION INTRAVENOUS at 08:27

## 2022-05-02 RX ADMIN — Medication 125 MG: at 08:25

## 2022-05-02 RX ADMIN — HYDRALAZINE HYDROCHLORIDE 25 MG: 25 TABLET ORAL at 21:05

## 2022-05-02 RX ADMIN — LORATADINE 5 MG: 10 TABLET ORAL at 08:24

## 2022-05-02 RX ADMIN — FERROUS SULFATE TAB 325 MG (65 MG ELEMENTAL FE) 325 MG: 325 (65 FE) TAB at 08:24

## 2022-05-02 RX ADMIN — HEPARIN SODIUM 5000 UNITS: 5000 INJECTION INTRAVENOUS; SUBCUTANEOUS at 14:26

## 2022-05-02 RX ADMIN — CITALOPRAM HYDROBROMIDE 40 MG: 20 TABLET ORAL at 08:24

## 2022-05-02 RX ADMIN — FLUTICASONE PROPIONATE 1 SPRAY: 50 SPRAY, METERED NASAL at 08:26

## 2022-05-02 RX ADMIN — HEPARIN SODIUM 5000 UNITS: 5000 INJECTION INTRAVENOUS; SUBCUTANEOUS at 06:38

## 2022-05-02 RX ADMIN — RISPERIDONE 0.25 MG: 0.25 TABLET ORAL at 17:22

## 2022-05-02 RX ADMIN — CEFTRIAXONE SODIUM 1000 MG: 10 INJECTION, POWDER, FOR SOLUTION INTRAVENOUS at 06:26

## 2022-05-02 RX ADMIN — DONEPEZIL HYDROCHLORIDE 10 MG: 5 TABLET, FILM COATED ORAL at 21:02

## 2022-05-02 RX ADMIN — RISPERIDONE 0.25 MG: 0.25 TABLET ORAL at 08:24

## 2022-05-02 RX ADMIN — SENNOSIDES AND DOCUSATE SODIUM 1 TABLET: 50; 8.6 TABLET ORAL at 21:01

## 2022-05-02 NOTE — ASSESSMENT & PLAN NOTE
· BP stable  · PTA: Amlodipine 10 mg qd, hydralazine 25 mg TID, Losartan 25 mg QD, lasix 10 mg QD   · Continue above, Hold Lasix for now while Receiving IVF overnight   Mildly dehydrated with hypercalcemia, also noted to have Elevated CK

## 2022-05-02 NOTE — CASE MANAGEMENT
Case Management Discharge Planning Note    Patient name Andi Hancock  Location S /S -29 MRN [id number]  : 1945 Date 2022       Current Admission Date: 2022  Current Admission Diagnosis:FTT (failure to thrive) in adult   Patient Active Problem List    Diagnosis Date Noted    Abnormal urinalysis 2022    FTT (failure to thrive) in adult 2022    Non-traumatic rhabdomyolysis 2022    Medicare annual wellness visit, subsequent 2022    CKD (chronic kidney disease) 2021    Vision impairment 2021    Leg swelling 10/11/2021    Low bicarbonate level 2021    Urine incontinence 2021    Slow transit constipation 2021    Vertigo 2020    Essential hypertension 2020    Anxiety 2020    Gait instability 2020    Polypharmacy 10/06/2020    Bilateral primary osteoarthritis of knee 2020    Other hyperlipidemia 2020    Type 2 diabetes mellitus with stage 3b chronic kidney disease and hypertension ([facility] ) 2020    History of recurrent UTIs 2020    Sleep difficulties 2020    Late onset Alzheimer's disease without behavioral disturbance ([facility] ) 2020    Moderate episode of recurrent major depressive disorder ([facility] ) 2020    Vitamin D deficiency 2020      LOS (days): 2  Geometric Mean LOS (GMLOS) (days): 3 10  Days to GMLOS:1 4     OBJECTIVE:  Risk of Unplanned Readmission Score: 23         Current admission status: Inpatient   Preferred Pharmacy:   [facility]/pharmacy #[id number] - [address]  [address] [place] [address]  Phone: [phone] Fax: [phone]    [address]  [place] [address]  Phone: [phone] Fax: [phone]    Primary Care Provider: Sang Benitez MD    Primary Insurance: MEDICARE  Secondary Insurance: [address] [place] [address] DETAILS:    Discharge planning discussed with[de-identified] daughter  Freedom of Choice: Yes  Comments - Freedom of Choice: CM reviewed with pt's daughter the recommendaitons of the care team for rehab/LTC  She is in agreement with this as she states she is having great difficulty caring for her mother even with HHA's assistance  She states her mother refuses to get up to assist with transfers and to walk  CM contacted family/caregiver?: Yes  Were Treatment Team discharge recommendations reviewed with patient/caregiver?: Yes  Did patient/caregiver verbalize understanding of patient care needs?: Yes  Were patient/caregiver advised of the risks associated with not following Treatment Team discharge recommendations?: Yes    Contacts  Patient Contacts: daughter Paige Gonzales  Relationship to Patient[de-identified] Family  Contact Method: Phone  Reason/Outcome: Continuity of [address]         [place] the [place] [facility] [address] [place]?: No    DME Referral Provided  Referral made for DME?: No    Other Referral/Resources/Interventions Provided:  Interventions: SNF,Short Term Rehab  Referral Comments: Daughter would like referrals made to those faiclities close to [place]  She also provided consent to have referrals made to [facility]  Pt will need a faicility that is able to accept MA as family is looking into LTC for pt  Daughter states she does not want a referral made to  as pt has been there in the past and they were not happy with her care there  Referrals have been made at the request of pt's daughter  List of facilities that referrals were made to was also emailed to her at her request   CM will continue to follow to assist with needs      Would you like to participate in our [address] service program?  : No - Declined    Treatment Team Recommendation: Short Term Rehab,SNF  Discharge Destination Plan[de-identified] Short Term Rehab,SNF

## 2022-05-02 NOTE — ASSESSMENT & PLAN NOTE
· Patient is pleasant  Alert to self  Follows commands, moves all extremities  No focal deficits    · Continue PTA meds: aricept, namenda,

## 2022-05-02 NOTE — APP STUDENT NOTE
FTT (failure to thrive)  · Max assist out of bed and requires assistance for all ADLs  · PT/OT consult  · Will need placement     Abnormal Urinalysis  · UA showed innumerable bacteria, WBC 10-20, and protein >300  · No fevers, abdominal pain, urinary frequency, or dysuria     · Ceftriaxone day 2  · Awaiting urine culture results     Non-traumatic rhabdomyolysis   · UA showed protein was >300  · CK trending down (372->324)  · Fluid hydration  · Trend CK    CKD (chronic kidney disease)  · Creatinine stable, 1 73, baseline is about 1 85  · Urinary retention protocol    Essential Hypertension   · /77  · Continue home regimen of amlodipine 10mg qd, hydralazine 25mg TID, losartan 25mg qd  · Withhold lasix, potential dehydration     Type 2 diabetets mellitus with stage 3b chronic kidney disease and hypertension (HCC)  · HgbA1C is 5 8  · Trend serum glucose, SSI    Moderate episode of recurrent major depressive disorder (HCC)  · Continue wellbutrin, celexa, risperdal, trazadone   · F/U outpatient    Late onset Alzheimer's disease without behavioral disturbance ([facility] [address] )  · Continue donepezil and memantine       VTE Pharmacologic Prophylaxis: VTE Score: 4 Moderate Risk (Score 3-4) - Pharmacological DVT Prophylaxis Ordered: heparin  Patient Centered Rounds: I performed bedside rounds with nursing staff today  Discussions with Specialists or Other Care Team Provider: none    Education and Discussions with Family / Patient: Patient declined call to   Time Spent for Care: 45 minutes  More than 50% of total time spent on counseling and coordination of care as described above  Current Length of Stay: 2 day(s)  Current Patient Status: Inpatient   Certification Statement: The patient will continue to require additional inpatient hospital stay due to awaiting ot/pt, needs placement   Discharge Plan: Anticipate discharge in 48 hrs to rehab facility      Code Status: Level 3 - DNAR and DNI    Subjective: Patient reports feeling well  She denies nausea, vomiting, chills, pain, or any urinary symptoms  She is hungry and awaiting someone to feed her  Objective:     Vitals:   Temp (24hrs), Av 3 °F (36 8 °C), Min:98 °F (36 7 °C), Max:98 5 °F (36 9 °C)    Temp:  [98 °F (36 7 °C)-98 5 °F (36 9 °C)] 98 2 °F (36 8 °C)  HR:  [71-81] 81  Resp:  [14-17] 14  BP: (123-142)/(66-81) 142/77  SpO2:  [93 %-99 %] 95 %  Body mass index is 33 09 kg/m²  Input and Output Summary (last 24 hours): Intake/Output Summary (Last 24 hours) at 2022 1013  Last data filed at 2022 0835  Gross per 24 hour   Intake 1100 ml   Output 300 ml   Net 800 ml       Physical Exam:   Physical Exam  Vitals and nursing note reviewed  Constitutional:       General: She is not in acute distress  Appearance: She is obese  She is not toxic-appearing  HENT:      Head: Normocephalic  Right Ear: External ear normal       Left Ear: External ear normal       Nose: Nose normal       Mouth/Throat:      Mouth: Mucous membranes are moist       Pharynx: Oropharynx is clear  Eyes:      General: No scleral icterus  Conjunctiva/sclera: Conjunctivae normal       Pupils: Pupils are equal, round, and reactive to light  Cardiovascular:      Rate and Rhythm: Normal rate and regular rhythm  Pulses: Normal pulses  Heart sounds: Normal heart sounds  No murmur heard  No gallop  Pulmonary:      Effort: Pulmonary effort is normal  No respiratory distress  Breath sounds: Normal breath sounds  No stridor  No wheezing, rhonchi or rales  Abdominal:      General: Abdomen is flat  There is no distension  Palpations: Abdomen is soft  Tenderness: There is no abdominal tenderness  There is no right CVA tenderness, left CVA tenderness, guarding or rebound  Musculoskeletal:         General: Normal range of motion  Cervical back: Neck supple  Right lower leg: No edema  Left lower leg: No edema     Skin: General: Skin is warm and dry  Capillary Refill: Capillary refill takes less than 2 seconds  Coloration: Skin is not jaundiced or pale  Findings: No bruising, erythema or lesion  Neurological:      Mental Status: She is alert  Mental status is at baseline     Psychiatric:         Mood and Affect: Mood normal          Behavior: Behavior normal           Additional Data:     Labs:  Results from last 7 days   Lab Units 05/02/22  0443   WBC Thousand/uL 6 61   HEMOGLOBIN g/dL 10 4*   HEMATOCRIT % 34 1*   PLATELETS Thousands/uL 296   NEUTROS PCT % 67   LYMPHS PCT % 19   MONOS PCT % 11   EOS PCT % 1     Results from last 7 days   Lab Units 05/02/22  0443   SODIUM mmol/L 137   POTASSIUM mmol/L 4 9   CHLORIDE mmol/L 106   CO2 mmol/L 21   BUN mg/dL 24   CREATININE mg/dL 1 73*   ANION GAP mmol/L 10   CALCIUM mg/dL 8 9   ALBUMIN g/dL 2 1*   TOTAL BILIRUBIN mg/dL 0 44   ALK PHOS U/L 95   ALT U/L 25   AST U/L 55*   GLUCOSE RANDOM mg/dL 127     Results from last 7 days   Lab Units 04/30/22  1818   INR  0 94     Results from last 7 days   Lab Units 05/02/22  0735 05/01/22  2133 05/01/22  1631 05/01/22  1112 05/01/22  0725 04/30/22  2243   POC GLUCOSE mg/dl 145* 173* 147* 110 98 121     Results from last 7 days   Lab Units 04/30/22  1711   HEMOGLOBIN A1C % 5 8*     Results from last 7 days   Lab Units 05/01/22  1253   LACTIC ACID mmol/L 0 6       Lines/Drains:  Invasive Devices  Report    Peripheral Intravenous Line            Peripheral IV 04/30/22 Left;Upper Arm 1 day                      Imaging: Reviewed radiology reports from this admission including: CT head    Recent Cultures (last 7 days):   Results from last 7 days   Lab Units 05/01/22  0246   URINE CULTURE  >100,000 cfu/ml Gram Negative Rodney*       Last 24 Hours Medication List:   Current Facility-Administered Medications   Medication Dose Route Frequency Provider Last Rate    acetaminophen  650 mg Oral Q6H PRN NESSA Loza      amLODIPine  10 mg Oral Daily Clearence Bake, CRNP      ascorbic acid  125 mg Oral Daily Clearence Bake, CRNP      aspirin  81 mg Oral Daily Clearence Bake, [address]      buPROPion  150 mg Oral Daily Clearence Bake, [address]      cefTRIAXone  1,000 mg Intravenous Q24H Clearence Bake, CRNP 1,000 mg (05/02/22 0626)    citalopram  40 mg Oral Daily Clearence Bake, CRNP      donepezil  10 mg Oral HS Clearence Bake, CRNP      dorzolamide-timolol  1 drop Both Eyes Q12H [address] Clearence Bake, CRNP      ferrous sulfate  325 mg Oral Daily With Breakfast Clearence Bake, CRNP      fluticasone  1 spray Each Nare Daily Clearence Bake, CRNP      heparin (porcine)  5,000 Units Subcutaneous Acey Para [address], [address]      hydrALAZINE  25 mg Oral TID Clearence Bake, CRNP      insulin lispro  1-5 Units Subcutaneous HS Clearence Bake, CRNP      insulin lispro  1-6 Units Subcutaneous TID [facility] Clearence Bake, CRNP      latanoprost  1 drop Both Eyes HS Clearence Bake, CRNP      loratadine  5 mg Oral Daily Clearence Bake, CRNP      losartan  25 mg Oral Daily Clearence Bake, [address]      meclizine  25 mg Oral Q12H PRN Clearence Bake, CRNP      memantine  10 mg Oral BID Clearence Bake, CRNP      multivitamin-minerals  1 tablet Oral Daily Clearence Bake, [address]      pantoprazole  40 mg Oral Early Morning Clearence Bake, CRNP      polyethylene glycol  17 g Oral Daily PRN Clearence Bake, CRNP      risperiDONE  0 25 mg Oral BID Clearence Bake, CRNP      senna-docusate sodium  1 tablet Oral HS Clearence Bake, CRNP      traZODone  50 mg Oral HS Clearence Bake, CRNP          Today, Patient Was Seen By: Ayo Nichole    **Please Note: This note may have been constructed using a voice recognition system  **

## 2022-05-02 NOTE — PLAN OF CARE
Problem: MOBILITY - ADULT  Goal: Maintain or return to baseline ADL function  Description: INTERVENTIONS:  -  Assess patient's ability to carry out ADLs; assess patient's baseline for ADL function and identify physical deficits which impact ability to perform ADLs (bathing, care of mouth/teeth, toileting, grooming, dressing, etc )  - Assess/evaluate cause of self-care deficits   - Assess range of motion  - Assess patient's mobility; develop plan if impaired  - Assess patient's need for assistive devices and provide as appropriate  - Encourage maximum independence but intervene and supervise when necessary  - Involve family in performance of ADLs  - Assess for home care needs following discharge   - Consider OT consult to assist with ADL evaluation and planning for discharge  - Provide patient education as appropriate  Outcome: Progressing  Goal: Maintains/Returns to pre admission functional level  Description: INTERVENTIONS:  - Perform BMAT or MOVE assessment daily    - Set and communicate daily mobility goal to care team and patient/family/caregiver  - Collaborate with rehabilitation services on mobility goals if consulted  - Perform Range of Motion  times a day  - Reposition patient every  hours    - Dangle patient  times a day  - Stand patient  times a day  - Ambulate patient  times a day  - Out of bed to chair  times a day   - Out of bed for meals times a day  - Out of bed for toileting  - Record patient progress and toleration of activity level   Outcome: Progressing

## 2022-05-02 NOTE — PLAN OF CARE
Problem: OCCUPATIONAL THERAPY ADULT  Goal: Performs self-care activities at highest level of function for planned discharge setting  See evaluation for individualized goals  Description:   Outcome: Progressing  Note: Limitation: Decreased ADL status,Decreased UE strength,Decreased Safe judgement during ADL,Decreased cognition,Decreased endurance,Decreased self-care trans  Prognosis: Good  Assessment: Pt is a 77 y o  female seen for OT evaluation at [facility], admitted 4/30/2022 w/ FTT (failure to thrive) in adult  OT completed extensive review of pt's medical and social history  Comorbidities affecting pt's functional performance at time of assessment include: alzheimer's dementia, depression, DM type 2, gait instability, HTN, rhabdomyolysis, anxiety, incontinence, visual impairment  Prior to admission, pt was living in ranch home with daughter and was assisted for ADL/IADL, uses RW  Upon evaluation, pt presents to OT below baseline due to the following performance deficits: weakness, decreased strength, decreased balance, decreased tolerance, impaired memory, impaired problem solving, impulsivity, decreased safety awareness and increased pain  Pt to benefit from continued skilled OT tx while in the hospital to address deficits as defined above and maximize level of functional independence w ADL's and functional mobility  Occupational Performance areas to address include: grooming, bathing/shower, toilet hygiene, dressing, functional mobility and functional transfer, bed mobility  The patient's raw score on the AM-PAC Daily Activity inpatient short form is 14, standardized score is 33 39, less than 39 4  Patients at this level are likely to benefit from DC to post-acute rehabilitation services  Based on findings, pt is of high complexity  Pt co-treated with physical therapy due to medical complexity; skilled assist of 2 therapists required for safety   At this time, OT recommendations at time of discharge are short term rehab       OT Discharge Recommendation: Post acute rehabilitation services

## 2022-05-02 NOTE — PHYSICAL THERAPY NOTE
PHYSICAL THERAPY EVALUATION NOTE          Patient Name: Andi Hancock  YZRSN'C Date: 2022       AGE:   77 y o  Mrn:   [id number]  ADMIT DX:  Weakness [R53 1]  Failure to thrive in adult [R62 7]  Generalized weakness [R53 1]  Leg edema, right [R60 0]  Ambulatory dysfunction [R26 2]  Contusion of right orbital tissues [S05 11XA]    Past Medical History:   Diagnosis Date    Acute cystitis without hematuria 2020    Allergic rhinitis 2021    Anxiety     Arm swelling 2021    Cholesterol depletion     Confusion     Confusion 2021    Dementia [facility] [address] )     Depression     Diabetes ([facility] [address] [facility]     Early onset Alzheimer's dementia ([facility] [address] )     Encounter for screening colonoscopy 2020    Glaucoma     Hypertension     Hypertension     Memory loss     Right leg swelling 2021     Length Of Stay: 2  PHYSICAL THERAPY EVALUATION :   Patient's identity confirmed via 2 patient identifiers (full name and ) at start of session       22 0855   PT Last Visit   PT Visit Date 22   Note Type   Note type Evaluation   Pain Assessment   Pain Assessment Tool FLACC   Pain Location/Orientation Location: Generalized   Effect of Pain on Daily Activities limits pt's tolerance to functional mobility   Hospital Pain Intervention(s) Repositioned; Emotional support   Pain Rating: FLACC (Rest) - Face 0   Pain Rating: FLACC (Rest) - Legs 0   Pain Rating: FLACC (Rest) - Activity 0   Pain Rating: FLACC (Rest) - Cry 0   Pain Rating: FLACC (Rest) - Consolability 0   Score: FLACC (Rest) 0   Pain Rating: FLACC (Activity) - Face 1   Pain Rating: FLACC (Activity) - Legs 1   Pain Rating: FLACC (Activity) - Activity 1   Pain Rating: FLACC (Activity) - Cry 1   Pain Rating: FLACC (Activity) - Consolability 1   Score: FLACC (Activity) 5   Restrictions/Precautions   Weight Bearing Precautions Per Order No   Other Precautions Cognitive; Chair Alarm; Bed Alarm;Multiple lines; Fall Risk;Pain   Home Living   Type of [address] One level;Performs ADLs on one level; Able to live on main level with bedroom/bathroom;Stairs to enter with rails  (3 ASA)   [address] [place] [address] chair   Home Equipment Walker   Additional Comments Pt poor/unreliable historian due to baseline Alzheimer's dementia, home set-up and PLOF obtained from chart review  Pt resides w/ daughter and son-in-law in a 1 level house w/ 3 ASA   Prior Function   Level of [place] Needs assistance with IADLs   Lives With Daughter;Family  (daughter, son-in-law)   Receives Help From Family   ADL Assistance Needs assistance   IADLs Needs assistance   Falls in the last 6 months 1 to 4  (at least 1; fall out of shower chair reason for admission)   Comments Per chart review pt ambulates w/ RW, requires assistance w/ ALDs IADLs, w/ at least 1 recent fall   General   Family/Caregiver Present No   Cognition   Overall Cognitive Status Impaired   Arousal/Participation Cooperative   Attention Difficulty attending to directions   Orientation Level Oriented to person;Disoriented to place; Disoriented to time;Disoriented to situation   Memory Decreased short term memory;Decreased recall of recent events;Decreased recall of precautions   Following Commands Follows one step commands with increased time or repetition   Comments Pt ID via name and ; pt agreeable to PT eval w/ encouragement  Pt required re-directing to task at hand and repetition of cues   Strength RLE   RLE Overall Strength 3-/5  (grossly assessed w/ functional mobility)   Strength LLE   LLE Overall Strength 3-/5  (grossly assessed w/ functional mobility)   Bed Mobility   Supine to Sit 2  Maximal assistance   Additional items Assist x 2;HOB elevated; Increased time required;Verbal cues;LE management   Sit to Supine 2  Maximal assistance   Additional items Assist x 2; Increased time required;Verbal cues;LE management   Additional Comments Pt required mod-max Ax1 progressing to close supervision to maintain static sitting balance at EOB due to posterior lean/poor trunk control   Transfers   Sit to Stand 2  Maximal assistance   Additional items Assist x 2; Increased time required;Verbal cues   Stand to Sit 2  Maximal assistance   Additional items Assist x 2; Increased time required;Verbal cues   Additional Comments Pt performed 2 STS transfers w/ max Ax2 via bilateral hand-held assist and knee block as needed  Pt able to achieve approx 75% upright standing position for about 10-15 seconds each trial   Ambulation/Elevation   Gait pattern Not appropriate; Not tested   Ambulation/Elevation Additional Comments Not tested due to pt's limited standing tolerance, inability to achieve full upright standing position   Balance   Static Sitting Fair -  (poor progressing to fair-)   Dynamic Sitting Poor   Static Standing Zero  (Max Ax2)   Ambulatory   (unable to assess)   Activity Tolerance   Activity Tolerance Patient limited by fatigue;Patient limited by pain  (limited due to cognition)   Medical Staff [place] coordination w/ OT [place] due to pt's medical complexity, regression from mobility baseline, and cognitive/safety awareness deficits requiring two skilled clinicians to perform evaluation; individual items assessed and goals set; Vesna Tipton   Nurse Made Aware ZOË Weller   Assessment   Prognosis Fair   Problem List Decreased strength;Decreased endurance; Impaired balance;Decreased mobility; Decreased cognition; Impaired judgement;Decreased safety awareness;Pain   Assessment Shalom Simpson is a 77 y o  Female who [facility] due to fall out of shower chair and diagnosis of failure to thrive  Orders for PT eval and treat received, w/ activity orders of up w/ assist and fall risk precautions  Comorbidities affecting pt at time of evaluation include: Alzheimer's dementia, anxiety, DM, HTN   Personal factors affecting DC include: ambulating w/ assistive device, stairs to enter home, inability to ambulate household distances, inability to navigate level surfaces w/o external assistance, decreased cognition, positive fall history, inability to perform IADLs and inability to perform ADLs  At baseline, pt mobilizes w/ RW w/ at least 1 fall(s) in the previous 6 months  Upon evaluation, pt presents w/ the following deficits: weakness, impaired balance, decreased endurance, pain limiting functional mobility and limited tolerance to functional mobility  Pt currently requires max Ax2 for bed mobility, Max Ax2 for transfers, and was unable to ambulate at this time  Pt's clinical presentation is unstable/unpredictable due to need for assist w/ all phases of mobility, pain impacting overall mobility status, need for input for task focus and mobility technique, recent drastic decline in mobility compared to baseline, and recent history of falls  Pt is at an increased risk of falls due to impaired cognition, impaired safety awareness, and impaired balance  From a PT/mobility standpoint, given the above findings, DC recommendation is for Post-acute inpatient rehabilitation  During this admission, pt would benefit from continued skilled inpatient PT in the acute care setting in order to address the above deficits to maximize function and mobility before DC from acute care      Barriers to Discharge Inaccessible home environment   Goals   Patient Goals Pt did not provide goal at this time   STG Expiration Date 05/12/22   Short Term Goal #1 Pt will: perform bilateral rolling bed mobility w/ min Ax1 to decrease burden of care; perform supine<>sit mobility w/ mod Ax1 to increase pt's functional mobility; sit at EOB w/ supervision for at least 30 minutes to increase pt's tolerance to an upright sitting position; perform transfers w/ mod Ax1 to increase pt's OOB mobility; ambulate at least 30' w/ RW and mod Ax1 to increase pt's functional mobility; increase all balance ratings by at least 1 grade to decrease pt's risk of falls; PT to see for stair negotiation when appropriate   PT Treatment Day 0   Plan   Treatment/Interventions Functional transfer training;LE strengthening/ROM; Therapeutic exercise; Endurance training;Patient/family training;Equipment eval/education; Bed mobility;Gait training  (PT to see for stair negotiation when appropriate)   PT Frequency 3-5x/wk   Recommendation   PT Discharge Recommendation Post acute rehabilitation services   Equipment Recommended   (to be determined)   [place] 8 in Bed Without Bedrails 2   Lying on Back to Sitting on Edge of Flat Bed 1   Moving Bed to Chair 1   Standing Up From Chair 1   Walk in Room 1   Climb 3-5 Stairs 1   Basic Mobility Inpatient Raw Score 7   Highest Level Of Mobility   -HL Goal 2: Bed activities/Dependent transfer   JH-HLM Highest Level of Mobility 3: Sit at edge of bed   JH-HLM Goal Achieved Yes   End of Consult   Patient Position at End of Consult Supine;Bed/Chair alarm activated; All needs within reach  [facility] elevated, wedge under R trunk)       The patient's AM-PAC Basic Mobility Inpatient Short Form Raw Score is 7  A Raw score of less than or equal to 16 suggests the patient may benefit from discharge to post-acute rehabilitation services  Please also refer to the recommendation of the Physical Therapist for safe discharge planning      Pt would benefit from skilled inpatient PT during this admission in order to facilitate progress towards goals to maximize functional independence    DC rec: post acute rehab      Nikhil Rodriguez, PT, DPT  05/02/22

## 2022-05-02 NOTE — OCCUPATIONAL THERAPY NOTE
Occupational Therapy Evaluation      Kerinetomatilde Tessa    5/2/2022    Principal Problem:    FTT (failure to thrive) in adult  Active Problems:    Late onset Alzheimer's disease without behavioral disturbance (HCC)    Moderate episode of recurrent major depressive disorder ([facility] [address] )    Type 2 diabetes mellitus with stage 3b chronic kidney disease and hypertension (HCC)    Gait instability    Essential hypertension    CKD (chronic kidney disease)    Non-traumatic rhabdomyolysis    Abnormal urinalysis      Past Medical History:   Diagnosis Date    Acute cystitis without hematuria 8/13/2020    Allergic rhinitis 5/18/2021    Anxiety     Arm swelling 8/30/2021    Cholesterol depletion     Confusion     Confusion 5/17/2021    Dementia ([facility] )     Depression     Diabetes ([facility] )     Early onset Alzheimer's dementia ([facility] [address] )     Encounter for screening colonoscopy 9/22/2020    Glaucoma     Hypertension     Hypertension     Memory loss     Right leg swelling 8/30/2021       Past Surgical History:   Procedure Laterality Date    CYSTOSCOPY  06/10/2021    HYSTERECTOMY          05/02/22 0919   OT Last Visit   OT Visit Date 05/02/22   Note Type   Note type Evaluation   Restrictions/Precautions   Weight Bearing Precautions Per Order No   Other Precautions Cognitive; Chair Alarm; Bed Alarm;Multiple lines; Fall Risk;Pain   Pain Assessment   Pain Assessment Tool FLACC   Pain Location/Orientation Location: Generalized   Pain Rating: FLACC (Rest) - Face 0   Pain Rating: FLACC (Rest) - Legs 0   Pain Rating: FLACC (Rest) - Activity 0   Pain Rating: FLACC (Rest) - Cry 0   Pain Rating: FLACC (Rest) - Consolability 0   Score: FLACC (Rest) 0   Pain Rating: FLACC (Activity) - Face 1   Pain Rating: FLACC (Activity) - Legs 1   Pain Rating: FLACC (Activity) - Activity 1   Pain Rating: FLACC (Activity) - Cry 1   Pain Rating: FLACC (Activity) - Consolability 1   Score: FLACC (Activity) 5   Home Living   Type of Home House   Home Layout One level;Performs ADLs on one level; Able to live on main level with bedroom/bathroom;Stairs to enter with rails  (3STE)   [address] [place] chair   Home Equipment Walker   Prior Function   Level of [place] Needs assistance with IADLs; Needs assistance with ADLs and functional mobility   Lives With Daughter   Receives Help From Family   ADL Assistance Needs assistance   IADLs Needs assistance   Falls in the last 6 months 1 to 4   Comments Pt poor historian  Information obtained from chart  Psychosocial   Psychosocial (WDL) X   ADL   Eating Assistance 5  Supervision/Setup   Grooming Assistance 5  Supervision/Setup   UB Bathing Assistance 4  Minimal Assistance   LB Bathing Assistance 2  Maximal Assistance   UB Dressing Assistance 4  Minimal Assistance   LB Dressing Assistance 2  Maximal [address]  2  Maximal Assistance   Bed Mobility   Supine to Sit 2  Maximal assistance   Additional items Assist x 2   Sit to Supine 2  Maximal assistance   Additional items Assist x 2   Additional Comments Pt initially required Mod-Max A to maintain seated position EOB   Transfers   Sit to Stand 2  Maximal assistance   Additional items Assist x 2   Stand to Sit 2  Maximal assistance   Additional items Assist x 2   Stand pivot Unable to assess   Additional Comments Pt unable to get to fully upright position  2 attempts made  Not safe to attempt transfer at this time  Activity Tolerance   Activity Tolerance Patient limited by fatigue;Patient limited by pain;Treatment limited secondary to agitation   Medical Staff Made Aware PT Leona; cotx required due to medical complexity   Nurse Made Aware ZOË Darling   RUE Assessment   RUE Assessment WFL   LUE Assessment   LUE Assessment WFL   Cognition   Overall Cognitive Status Impaired   Arousal/Participation Alert; Cooperative   Attention Difficulty attending to directions   Orientation Level Oriented to person;Disoriented to place; Disoriented to time;Disoriented to situation   Memory Decreased short term memory;Decreased recall of recent events;Decreased recall of precautions   Following Commands Follows one step commands with increased time or repetition   Comments Pt required redirection and cueing  Limited by short term memory   Assessment   Limitation Decreased ADL status; Decreased UE strength;Decreased Safe judgement during ADL;Decreased cognition;Decreased endurance;Decreased self-care trans   Prognosis Good   Assessment Pt is a 77 y o  female seen for OT evaluation at [address] [place] [address], admitted 4/30/2022 w/ FTT (failure to thrive) in adult  OT completed extensive review of pt's medical and social history  Comorbidities affecting pt's functional performance at time of assessment include: alzheimer's dementia, depression, DM type 2, gait instability, HTN, rhabdomyolysis, anxiety, incontinence, visual impairment  Prior to admission, pt was living in ranch home with daughter and was assisted for ADL/IADL, uses RW  Upon evaluation, pt presents to OT below baseline due to the following performance deficits: weakness, decreased strength, decreased balance, decreased tolerance, impaired memory, impaired problem solving, impulsivity, decreased safety awareness and increased pain  Pt to benefit from continued skilled OT tx while in the hospital to address deficits as defined above and maximize level of functional independence w ADL's and functional mobility  Occupational Performance areas to address include: grooming, bathing/shower, toilet hygiene, dressing, functional mobility and functional transfer, bed mobility  The patient's raw score on the AM-PAC Daily Activity inpatient short form is 14, standardized score is 33 39, less than 39 4  Patients at this level are likely to benefit from DC to post-acute rehabilitation services  Based on findings, pt is of high complexity   Pt co-treated with physical therapy due to medical complexity; skilled assist of 2 therapists required for safety  At this time, OT recommendations at time of discharge are short term rehab  Plan   Treatment Interventions ADL retraining;Functional transfer training;UE strengthening/ROM; Endurance training;Cognitive reorientation;Patient/family training;Equipment evaluation/education; Compensatory technique education;Continued evaluation; Energy conservation   Goal Expiration Date 05/12/22   OT Frequency 3-5x/wk   Recommendation   OT Discharge Recommendation Post acute rehabilitation services   AM-PAC Daily Activity Inpatient   Lower Body Dressing 2   Bathing 2   Toileting 2   Upper Body Dressing 2   Grooming 3   Eating 3   Daily Activity Raw Score 14   Daily Activity Standardized Score (Calc for Raw Score >=11) 33 39   AM-PAC Applied Cognition Inpatient   Following a Speech/Presentation 2   Understanding Ordinary Conversation 2   Taking Medications 2   Remembering Where Things Are Placed or Put Away 2   Remembering List of 4-5 Errands 2   Taking Care of Complicated Tasks 2   Applied Cognition Raw Score 12   Applied Cognition Standardized Score 28 82     Pt will achieve the following goals within 10 days  *Pt will complete grooming with independence  *Pt will complete UB bathing and dressing with supervision  *Pt will complete LB bathing and dressing with Min A      *Pt will complete toileting (hygiene and clothing management) with supervision    *Pt will complete bed mobility with supervision, with bed flat and no side rail to prep for purposeful tasks    *Pt will perform functional transfers with supervision in order to complete ADL routine  *Pt will increase standing tolerance to 3-5+ minutes in order to complete ADL routine  *Pt will demonstrate increased activity tolerance in order to complete ADL routine      *Pt will participate in cognitive assessment to determine level of safety for returning home    *Pt will participate in UE therapeutic exercise in order to maximize strength for ADL transfers  *Pt will sit on EOB for 10+ minutes for increased safety with seated activity tolerance during ADL tasks  *Pt will identify 3-5 fall risks to ensure safety upon discharge      Chay Burgos MS, OTR/L

## 2022-05-02 NOTE — CASE MANAGEMENT
Case Management Discharge Planning Note    Patient name Yesika Garber  Location S /S -79 MRN [id number]  : 1945 Date 2022       Current Admission Date: 2022  Current Admission Diagnosis:FTT (failure to thrive) in adult   Patient Active Problem List    Diagnosis Date Noted    Abnormal urinalysis 2022    FTT (failure to thrive) in adult 2022    Non-traumatic rhabdomyolysis 2022    Medicare annual wellness visit, subsequent 2022    CKD (chronic kidney disease) 2021    Vision impairment 2021    Leg swelling 10/11/2021    Low bicarbonate level 2021    Urine incontinence 2021    Slow transit constipation 2021    Vertigo 2020    Essential hypertension 2020    Anxiety 2020    Gait instability 2020    Polypharmacy 10/06/2020    Bilateral primary osteoarthritis of knee 2020    Other hyperlipidemia 2020    Type 2 diabetes mellitus with stage 3b chronic kidney disease and hypertension ([facility] [address] ) 2020    History of recurrent UTIs 2020    Sleep difficulties 2020    Late onset Alzheimer's disease without behavioral disturbance ([facility] [address] ) 2020    Moderate episode of recurrent major depressive disorder ([facility] [address] ) 2020    Vitamin D deficiency 2020      LOS (days): 2  Geometric Mean LOS (GMLOS) (days):   Days to GMLOS:     OBJECTIVE:  Risk of Unplanned Readmission Score: 21         Current admission status: Inpatient   Preferred Pharmacy:   [facility]/pharmacy #[id number]- ABIGAIL Oswald - [address] [place] [address]  Phone: [phone] Fax: [phone]    [address]  [place] [address]  Phone: [phone] Fax: [phone]    Primary Care Provider: Yanet Preston MD    Primary Insurance: MEDICARE  Secondary [facility]    DISCHARGE DETAILS:     CM left a message for pt's daughter in order to review and discuss DCP  Pt will need placement and cm will review making referrals and follow up with SNF's  Cm requesting she call back

## 2022-05-02 NOTE — CASE MANAGEMENT
Case Management Assessment    Patient name Cherelle Griffin  [facility] S /S -36 MRN [id number]  : 1945 Date 2022       Current Admission Date: 2022  Current Admission Diagnosis:FTT (failure to thrive) in adult   Patient Active Problem List    Diagnosis Date Noted    Abnormal urinalysis 2022    FTT (failure to thrive) in adult 2022    Non-traumatic rhabdomyolysis 2022    Medicare annual wellness visit, subsequent 2022    CKD (chronic kidney disease) 2021    Vision impairment 2021    Leg swelling 10/11/2021    Low bicarbonate level 2021    Urine incontinence 2021    Slow transit constipation 2021    Vertigo 2020    Essential hypertension 2020    Anxiety 2020    Gait instability 2020    Polypharmacy 10/06/2020    Bilateral primary osteoarthritis of knee 2020    Other hyperlipidemia 2020    Type 2 diabetes mellitus with stage 3b chronic kidney disease and hypertension ([facility] [address] ) 2020    History of recurrent UTIs 2020    Sleep difficulties 2020    Late onset Alzheimer's disease without behavioral disturbance ([facility] [address] ) 2020    Moderate episode of recurrent major depressive disorder ([facility] [address] ) 2020    Vitamin D deficiency 2020      LOS (days): 2  Geometric Mean LOS (GMLOS) (days): 3 10  Days to GMLOS:1 4     OBJECTIVE:    Risk of Unplanned Readmission Score: 23         Current admission status: Inpatient  Referral Reason: Rehab,SNF, Initial    Preferred Pharmacy:   [facility]/pharmacy #[id number] Lee - [address]  [address] [place] [address]  Phone: [phone] Fax: [phone]    [address]  [place] [address]  Phone: [phone] Fax: [phone]    Primary Care Provider: Man Alvarado MD    Primary Insurance: MEDICARE  Secondary Insurance: [facility]    ASSESSMENT:  [address], [address] [facility] Representative - Daughter   Primary Phone: [phone] (Mobile)               Advance Directives  Does patient have a [address]?: No  Was patient offered paperwork?: Yes  Does patient currently have a Health Care decision maker?: Yes, please see Health Care Proxy section  Does patient have Advance Directives?: No  Was patient offered paperwork?: Yes  Primary Contact: daughter         Readmission Root Cause  30 Day Readmission: No    Patient Information  Admitted from[de-identified] Home  Mental Status: Confused  During Assessment patient was accompanied by: Daughter  Assessment information provided by[de-identified] Daughter  Primary Caregiver: Family  Caregiver's Name[de-identified] Lien Waldrop Relationship to Patient[de-identified] Family Member  Support Systems: Family members,Daughter,Son  [place] of Residence: [address] [place] [address] do you live in?: [place] entry access options  Select all that apply : Stairs  Number of steps to enter home : 3  Do the steps have railings?: Yes  Type of Current Residence: Ranch  In the last 12 months, was there a time when you were not able to pay the mortgage or rent on time?: No  In the last 12 months, was there a time when you did not have a steady place to sleep or slept in a shelter (including now)?: No  Living Arrangements: Lives w/ Daughter  Is patient a ?: No    Activities of Daily Living Prior to Admission  Functional Status: Assistance  Completes ADLs independently?: No  Level of ADL dependence: Total Dependent  Ambulates independently?: No  Level of ambulatory dependence:  Total Dependent  Does patient use assisted devices?: Yes  Assisted Devices (DME) used: Walker,Bedside Commode,Shower Chair,Wheelchair  Does patient currently own DME?: Yes  What DME does the patient currently own?: Bedside Commode,Wheelchair,Walker,Shower Chair  Does patient have a history of Outpatient Therapy (PT/OT)?: No  Does the patient have a history of Short-Term Rehab?: Yes (OO)  Does patient have a history of HHC?: Yes  Does patient currently have [facility]?: Yes    Current Home Health Care  Type of Current Home Care Services: Home health aide    Patient Information Continued  Income Source: SSI/SSD  Does patient have prescription coverage?: Yes  Within the past 12 months, you worried that your food would run out before you got the money to buy more : Never true  Within the past 12 months, the food you bought just didnt last and you didnt have money to get more : Never true  Does patient receive dialysis treatments?: No  Does patient have a history of substance abuse?: No  Does patient have a history of Mental Health Diagnosis?: No         Means of Transportation  Means of Transport to Appts[de-identified] Family transport  In the past 12 months, has lack of transportation kept you from medical appointments or from getting medications?: No  In the past 12 months, has lack of transportation kept you from meetings, work, or from getting things needed for daily living?: No    CM reviewed the availability of treatment team to discuss questions or concerns patient and/or family may have regarding  understanding medications and recognizing signs and symptoms at discharge  CM also encouraged patient to follow up with all recommended appointments after discharge  CM reviewed the information that will be provided to pt/family on the discharge instructions  Patient advised of importance for patient and family to participate in managing patient's medical well being

## 2022-05-02 NOTE — PLAN OF CARE
Problem: PHYSICAL THERAPY ADULT  Goal: Performs mobility at highest level of function for planned discharge setting  See evaluation for individualized goals  Description: Treatment/Interventions: Functional transfer training,LE strengthening/ROM,Therapeutic exercise,Endurance training,Patient/family training,Equipment eval/education,Bed mobility,Gait training (PT to see for stair negotiation when appropriate)  Equipment Recommended:  (to be determined)       See flowsheet documentation for full assessment, interventions and recommendations  5/2/2022 1048 by Aditya Massey PT  Note: Prognosis: Fair  Problem List: Decreased strength,Decreased endurance,Impaired balance,Decreased mobility,Decreased cognition,Impaired judgement,Decreased safety awareness,Pain  Assessment: Sienna Gloria is a 77 y o  Female who [facility]20[facility] due to fall out of shower chair and diagnosis of failure to thrive  Orders for PT eval and treat received, w/ activity orders of up w/ assist and fall risk precautions  Comorbidities affecting pt at time of evaluation include: Alzheimer's dementia, anxiety, DM, HTN  Personal factors affecting DC include: ambulating w/ assistive device, stairs to enter home, inability to ambulate household distances, inability to navigate level surfaces w/o external assistance, decreased cognition, positive fall history, inability to perform IADLs and inability to perform ADLs  At baseline, pt mobilizes w/ RW w/ at least 1 fall(s) in the previous 6 months  Upon evaluation, pt presents w/ the following deficits: weakness, impaired balance, decreased endurance, pain limiting functional mobility and limited tolerance to functional mobility  Pt currently requires max Ax2 for bed mobility, Max Ax2 for transfers, and was unable to ambulate at this time   Pt's clinical presentation is unstable/unpredictable due to need for assist w/ all phases of mobility, pain impacting overall mobility status, need for input for task focus and mobility technique, recent drastic decline in mobility compared to baseline, and recent history of falls  Pt is at an increased risk of falls due to impaired cognition, impaired safety awareness, and impaired balance  From a PT/mobility standpoint, given the above findings, DC recommendation is for Post-acute inpatient rehabilitation  During this admission, pt would benefit from continued skilled inpatient PT in the acute care setting in order to address the above deficits to maximize function and mobility before DC from acute care  Barriers to Discharge: Inaccessible home environment        PT Discharge Recommendation: Post acute rehabilitation services          See flowsheet documentation for full assessment

## 2022-05-02 NOTE — PROGRESS NOTES
[facility]  Progress Note - Sienna Gloria 1945, 77 y o  female MRN: [id number]  Unit/Bed#: S -01 Encounter: [id number]  Primary Care Provider: Tracey Carty MD   Date and time admitted to hospital: 4/30/2022  3:50 PM    * FTT (failure to thrive) in adult  Assessment & Plan  · Currently lives with daughter  Needs assistance with all ADLs  Max assist out of bed  · Will need placement  · PT, OT   · Gentle Hydration    Abnormal urinalysis  Assessment & Plan  · 10-20 white blood cells noted on urinalysis  No fevers  Patient with no complaints or abdominal pain  · Was started on IV ceftriaxone, continue pending urine culture  Non-traumatic rhabdomyolysis  Assessment & Plan  · Gentle fluid hydration  · Trend CK    CKD (chronic kidney disease)  Assessment & Plan  · Creatinine stable and at baseline  · Retention protocol    Essential hypertension  Assessment & Plan  · BP stable  · PTA: Amlodipine 10 mg qd, hydralazine 25 mg TID, Losartan 25 mg QD, lasix 10 mg QD   · Continue above, Hold Lasix for now while Receiving IVF overnight  Mildly dehydrated with hypercalcemia, also noted to have Elevated CK    Gait instability  Assessment & Plan  · Originally presented to ED falling fall  Slide from shower chair to knees  On ASA  No head stroke or LOC per ED  · Chronic gait instability in setting of OA, deconditioning  · Head CT negative  · PT OT  · Fall precautions    Type 2 diabetes mellitus with stage 3b chronic kidney disease and hypertension ([facility] [address] )  Assessment & Plan  HbA1c is 5 8  Lab Results   Component Value Date    HGBA1C 5 8 (H) 04/30/2022   On Evangelina Kwon as outpatient     Accucheck, SSI    Moderate episode of recurrent major depressive disorder (HCC)  Assessment & Plan  · Continue Wellbutrin, celexa, risperdal, trazodone  · Follows with psychiatry as outpatient    Late onset Alzheimer's disease without behavioral disturbance ([facility] [address] )  Assessment & Plan  · Patient is pleasant  Alert to self  Follows commands, moves all extremities  No focal deficits  · Continue PTA meds: aricept, namenda,      VTE Pharmacologic Prophylaxis: VTE Score: 4 Moderate Risk (Score 3-4) - Pharmacological DVT Prophylaxis Ordered: heparin  Patient Centered Rounds: I performed bedside rounds with nursing staff today  Discussions with Specialists or Other Care Team Provider: CM, nursing    Education and Discussions with Family / Patient: Attempted to update  (daughter) via phone  Left voicemail  [id number]    Time Spent for Care: 30 minutes  More than 50% of total time spent on counseling and coordination of care as described above  Current Length of Stay: 2 day(s)  Current Patient Status: Inpatient   Certification Statement: The patient will continue to require additional inpatient hospital stay due to awaiting PT/OT for rehab  Discharge Plan: Anticipate discharge in 24-48 hrs to rehab facility  Code Status: Level 3 - DNAR and DNI    Subjective:   Patient with no complaints  She voices no pain  No fevers overnight  Per nursing her appetite has picked up with assistance  Patient is pleasant and oriented to self  She is at her baseline mental status  Objective:     Vitals:   Temp (24hrs), Av 3 °F (36 8 °C), Min:98 °F (36 7 °C), Max:98 5 °F (36 9 °C)    Temp:  [98 °F (36 7 °C)-98 5 °F (36 9 °C)] 98 2 °F (36 8 °C)  HR:  [71-81] 81  Resp:  [14-17] 14  BP: (123-142)/(66-81) 142/77  SpO2:  [93 %-99 %] 95 %  Body mass index is 33 09 kg/m²  Input and Output Summary (last 24 hours): Intake/Output Summary (Last 24 hours) at 2022 0806  Last data filed at 2022 0444  Gross per 24 hour   Intake 80 ml   Output 300 ml   Net -220 ml       Physical Exam:   Physical Exam  Vitals and nursing note reviewed  Constitutional:       General: She is not in acute distress  Appearance: Normal appearance  HENT:      Head: Normocephalic        Mouth/Throat:      Mouth: Mucous membranes are moist    Eyes:      General: No scleral icterus  Pupils: Pupils are equal, round, and reactive to light  Cardiovascular:      Rate and Rhythm: Normal rate and regular rhythm  Heart sounds: No murmur heard  Pulmonary:      Effort: Pulmonary effort is normal  No respiratory distress  Breath sounds: Normal breath sounds  No wheezing, rhonchi or rales  Abdominal:      General: Bowel sounds are normal  There is no distension  Palpations: Abdomen is soft  Tenderness: There is no abdominal tenderness  Musculoskeletal:         General: No swelling  Right lower leg: No edema  Left lower leg: No edema  Skin:     Capillary Refill: Capillary refill takes less than 2 seconds  Neurological:      General: No focal deficit present  Mental Status: She is alert  Mental status is at baseline  She is disoriented           Additional Data:     Labs:  Results from last 7 days   Lab Units 05/02/22  0443   WBC Thousand/uL 6 61   HEMOGLOBIN g/dL 10 4*   HEMATOCRIT % 34 1*   PLATELETS Thousands/uL 296   NEUTROS PCT % 67   LYMPHS PCT % 19   MONOS PCT % 11   EOS PCT % 1     Results from last 7 days   Lab Units 05/02/22  0443   SODIUM mmol/L 137   POTASSIUM mmol/L 4 9   CHLORIDE mmol/L 106   CO2 mmol/L 21   BUN mg/dL 24   CREATININE mg/dL 1 73*   ANION GAP mmol/L 10   CALCIUM mg/dL 8 9   ALBUMIN g/dL 2 1*   TOTAL BILIRUBIN mg/dL 0 44   ALK PHOS U/L 95   ALT U/L 25   AST U/L 55*   GLUCOSE RANDOM mg/dL 127     Results from last 7 days   Lab Units 04/30/22  1818   INR  0 94     Results from last 7 days   Lab Units 05/02/22  0735 05/01/22  2133 05/01/22  1631 05/01/22  1112 05/01/22  0725 04/30/22  2243   POC GLUCOSE mg/dl 145* 173* 147* 110 98 121     Results from last 7 days   Lab Units 04/30/22  1711   HEMOGLOBIN A1C % 5 8*     Results from last 7 days   Lab Units 05/01/22  1253   LACTIC ACID mmol/L 0 6       Lines/Drains:  Invasive Devices  Report    Peripheral Intravenous Line Peripheral IV 04/30/22 Left;Upper Arm 1 day                      Imaging: No pertinent imaging reviewed      Recent Cultures (last 7 days):         Last 24 Hours Medication List:   Current Facility-Administered Medications   Medication Dose Route Frequency Provider Last Rate    acetaminophen  650 mg Oral Q6H PRN Juvencio Nichol, DUCNP      amLODIPine  10 mg Oral Daily Juvencio Nichol, NESSA      ascorbic acid  125 mg Oral Daily Juvencio Nichol, CRNP      aspirin  81 mg Oral Daily Juvencio Nichol, [address]      buPROPion  150 mg Oral Daily Juvencio Nichol, [address]      cefTRIAXone  1,000 mg Intravenous Q24H JuvencioDUC TinajeroNP 1,000 mg (05/02/22 0626)    citalopram  40 mg Oral Daily NESSA Elizalde      dextrose 5 % and sodium chloride 0 45 %  100 mL/hr Intravenous Continuous Maxi Gomez PA-C      donepezil  10 mg Oral HS Juvencio NESSA Gandara      dorzolamide-timolol  1 drop Both Eyes Q12H [address] NESSA Elizalde      ferrous sulfate  325 mg Oral Daily With Breakfast NESSA Elizalde      fluticasone  1 spray Each Nare Daily NESSA Elizalde      heparin (porcine)  5,000 Units Subcutaneous [facility] Juvenciomariah Marser, [address]      hydrALAZINE  25 mg Oral TID Juvenciokeesha Gandara, NESSA      insulin lispro  1-5 Units Subcutaneous HS Juvenciomariah Marser, NESSA      insulin lispro  1-6 Units Subcutaneous TID [facility] JuvencioNESSA Tinajero      latanoprost  1 drop Both Eyes HS Juvenciokeesha Gandara, CRNP      loratadine  5 mg Oral Daily Juvencio Nichol, NESSA      losartan  25 mg Oral Daily Juvencio Nichol, [address]      meclizine  25 mg Oral Q12H PRN Juvenciokeesha Gandara, NESSA      memantine  10 mg Oral BID JuvencioNESSA Tinajero      multivitamin-minerals  1 tablet Oral Daily Juvencio Nichol, [address]      pantoprazole  40 mg Oral Early Morning Juvencio NicholNESSA      polyethylene glycol  17 g Oral Daily PRN Juvencio Nichol, NESSA      risperiDONE  0 25 mg Oral BID NESSA Elizalde      senna-docusate sodium  1 tablet Oral HS NESSA Elizalde      traZODone  50 mg Oral HS NESSA Elizalde          Today, Patient Was Seen By: Joseph Meyers PA-C    **Please Note: This note may have been constructed using a voice recognition system  **

## 2022-05-02 NOTE — PLAN OF CARE
Problem: MOBILITY - ADULT  Goal: Maintain or return to baseline ADL function  Description: INTERVENTIONS:  -  Assess patient's ability to carry out ADLs; assess patient's baseline for ADL function and identify physical deficits which impact ability to perform ADLs (bathing, care of mouth/teeth, toileting, grooming, dressing, etc )  - Assess/evaluate cause of self-care deficits   - Assess range of motion  - Assess patient's mobility; develop plan if impaired  - Assess patient's need for assistive devices and provide as appropriate  - Encourage maximum independence but intervene and supervise when necessary  - Involve family in performance of ADLs  - Assess for home care needs following discharge   - Consider OT consult to assist with ADL evaluation and planning for discharge  - Provide patient education as appropriate  Outcome: Progressing  Goal: Maintains/Returns to pre admission functional level  Description: INTERVENTIONS:  - Perform BMAT or MOVE assessment daily    - Set and communicate daily mobility goal to care team and patient/family/caregiver     - Collaborate with rehabilitation services on mobility goals if consulted  - Out of bed for toileting  - Record patient progress and toleration of activity level   Outcome: Progressing     Problem: Potential for Falls  Goal: Patient will remain free of falls  Description: INTERVENTIONS:  - Educate patient/family on patient safety including physical limitations  - Instruct patient to call for assistance with activity   - Consult OT/PT to assist with strengthening/mobility   - Keep Call bell within reach  - Keep bed low and locked with side rails adjusted as appropriate  - Keep care items and personal belongings within reach  - Initiate and maintain comfort rounds  - Make Fall Risk Sign visible to staff  - Apply yellow socks and bracelet for high fall risk patients  - Consider moving patient to room near nurses station  Outcome: Progressing     Problem: Prexisting or High Potential for Compromised Skin Integrity  Goal: Skin integrity is maintained or improved  Description: INTERVENTIONS:  - Identify patients at risk for skin breakdown  - Assess and monitor skin integrity  - Assess and monitor nutrition and hydration status  - Monitor labs   - Assess for incontinence   - Turn and reposition patient  - Assist with mobility/ambulation  - Relieve pressure over bony prominences  - Avoid friction and shearing  - Provide appropriate hygiene as needed including keeping skin clean and dry  - Evaluate need for skin moisturizer/barrier cream  - Collaborate with interdisciplinary team   - Patient/family teaching  - Consider wound care consult   Outcome: Progressing     Problem: PAIN - ADULT  Goal: Verbalizes/displays adequate comfort level or baseline comfort level  Description: Interventions:  - Encourage patient to monitor pain and request assistance  - Assess pain using appropriate pain scale  - Administer analgesics based on type and severity of pain and evaluate response  - Implement non-pharmacological measures as appropriate and evaluate response  - Consider cultural and social influences on pain and pain management  - Notify physician/advanced practitioner if interventions unsuccessful or patient reports new pain  Outcome: Progressing     Problem: INFECTION - ADULT  Goal: Absence or prevention of progression during hospitalization  Description: INTERVENTIONS:  - Assess and monitor for signs and symptoms of infection  - Monitor lab/diagnostic results  - Monitor all insertion sites, i e  indwelling lines, tubes, and drains  - Monitor endotracheal if appropriate and nasal secretions for changes in amount and color  - [place] appropriate cooling/warming therapies per order  - Administer medications as ordered  - Instruct and encourage patient and family to use good hand hygiene technique  - Identify and instruct in appropriate isolation precautions for identified infection/condition  Outcome: Progressing  Goal: Absence of fever/infection during neutropenic period  Description: INTERVENTIONS:  - Monitor WBC    Outcome: Progressing     Problem: SAFETY ADULT  Goal: Maintain or return to baseline ADL function  Description: INTERVENTIONS:  -  Assess patient's ability to carry out ADLs; assess patient's baseline for ADL function and identify physical deficits which impact ability to perform ADLs (bathing, care of mouth/teeth, toileting, grooming, dressing, etc )  - Assess/evaluate cause of self-care deficits   - Assess range of motion  - Assess patient's mobility; develop plan if impaired  - Assess patient's need for assistive devices and provide as appropriate  - Encourage maximum independence but intervene and supervise when necessary  - Involve family in performance of ADLs  - Assess for home care needs following discharge   - Consider OT consult to assist with ADL evaluation and planning for discharge  - Provide patient education as appropriate  Outcome: Progressing  Goal: Maintains/Returns to pre admission functional level  Description: INTERVENTIONS:  - Perform BMAT or MOVE assessment daily    - Set and communicate daily mobility goal to care team and patient/family/caregiver     - Collaborate with rehabilitation services on mobility goals if consulted  - Out of bed for toileting  - Record patient progress and toleration of activity level   Outcome: Progressing  Goal: Patient will remain free of falls  Description: INTERVENTIONS:  - Educate patient/family on patient safety including physical limitations  - Instruct patient to call for assistance with activity   - Consult OT/PT to assist with strengthening/mobility   - Keep Call bell within reach  - Keep bed low and locked with side rails adjusted as appropriate  - Keep care items and personal belongings within reach  - Initiate and maintain comfort rounds  - Make Fall Risk Sign visible to staff  - Apply yellow socks and bracelet for high fall risk patients  - Consider moving patient to room near nurses station  Outcome: Progressing     Problem: DISCHARGE PLANNING  Goal: Discharge to home or other facility with appropriate resources  Description: INTERVENTIONS:  - Identify barriers to discharge w/patient and caregiver  - Arrange for needed discharge resources and transportation as appropriate  - Identify discharge learning needs (meds, wound care, etc )  - Arrange for interpretive services to assist at discharge as needed  - Refer to Case Management Department for coordinating discharge planning if the patient needs post-hospital services based on physician/advanced practitioner order or complex needs related to functional status, cognitive ability, or social support system  Outcome: Progressing     Problem: GENITOURINARY - ADULT  Goal: Absence of urinary retention  Description: INTERVENTIONS:  - Assess patients ability to void and empty bladder  - Monitor I/O  - Bladder scan as needed  - Discuss with physician/AP medications to alleviate retention as needed  - Discuss catheterization for long term situations as appropriate  Outcome: Progressing     Problem: METABOLIC, FLUID AND ELECTROLYTES - ADULT  Goal: Electrolytes maintained within normal limits  Description: INTERVENTIONS:  - Monitor labs and assess patient for signs and symptoms of electrolyte imbalances  - Administer electrolyte replacement as ordered  - Monitor response to electrolyte replacements, including repeat lab results as appropriate  - Instruct patient on fluid and nutrition as appropriate  Outcome: Progressing  Goal: Fluid balance maintained  Description: INTERVENTIONS:  - Monitor labs   - Monitor I/O and WT  - Instruct patient on fluid and nutrition as appropriate  - Assess for signs & symptoms of volume excess or deficit  Outcome: Progressing  Goal: Glucose maintained within target range  Description: INTERVENTIONS:  - Monitor Blood Glucose as ordered  - Assess for signs and symptoms of hyperglycemia and hypoglycemia  - Administer ordered medications to maintain glucose within target range  - Assess nutritional intake and initiate nutrition service referral as needed  Outcome: Progressing

## 2022-05-02 NOTE — ASSESSMENT & PLAN NOTE
· Originally presented to ED falling fall  Slide from shower chair to knees  On ASA  No head stroke or LOC per ED  · Chronic gait instability in setting of OA, deconditioning     · Head CT negative  · PT OT  · Fall precautions

## 2022-05-02 NOTE — ASSESSMENT & PLAN NOTE
· 10-20 white blood cells noted on urinalysis  No fevers  Patient with no complaints or abdominal pain  · Was started on IV ceftriaxone, continue pending urine culture

## 2022-05-02 NOTE — ASSESSMENT & PLAN NOTE
HbA1c is 5 8  Lab Results   Component Value Date    HGBA1C 5 8 (H) 04/30/2022   On Estelle Hernandez as outpatient     Accucheck, SSI

## 2022-05-02 NOTE — CASE MANAGEMENT
Case Management Progress Note    Patient name Chiqui Klein  Location S /S -90 MRN [id number]  : 1945 Date 2022       LOS (days): 2  Geometric Mean LOS (GMLOS) (days):   Days to GMLOS:        OBJECTIVE:        Current admission status: Inpatient  Preferred Pharmacy:   [facility]/pharmacy #[id number]- [address] [address]  Phone: [phone] Fax: [phone]    [facility] [address]  [facility]   [address] [address]  Phone: [phone] Fax: [phone]    Primary Care Provider: Sri Madison MD    Primary Insurance: MEDICARE  Secondary Insurance: [address] [facility]    PROGRESS NOTE:    CM discussed goals of care with primary provider based on scores of Goals of Care systems list

## 2022-05-02 NOTE — ASSESSMENT & PLAN NOTE
· Currently lives with daughter  Needs assistance with all ADLs  Max assist out of bed  · Will need placement     · PT, OT   · Gentle Hydration

## 2022-05-03 PROBLEM — N39.0 UTI (URINARY TRACT INFECTION): Status: ACTIVE | Noted: 2022-05-02

## 2022-05-03 LAB
ALBUMIN SERPL BCP-MCNC: 2.4 G/DL (ref 3.5–5)
ALP SERPL-CCNC: 100 U/L (ref 46–116)
ALT SERPL W P-5'-P-CCNC: 25 U/L (ref 12–78)
ANION GAP SERPL CALCULATED.3IONS-SCNC: 9 MMOL/L (ref 4–13)
AST SERPL W P-5'-P-CCNC: 47 U/L (ref 5–45)
BACTERIA UR CULT: ABNORMAL
BACTERIA UR CULT: ABNORMAL
BILIRUB SERPL-MCNC: 0.38 MG/DL (ref 0.2–1)
BUN SERPL-MCNC: 23 MG/DL (ref 5–25)
CALCIUM ALBUM COR SERPL-MCNC: 10.6 MG/DL (ref 8.3–10.1)
CALCIUM SERPL-MCNC: 9.3 MG/DL (ref 8.3–10.1)
CHLORIDE SERPL-SCNC: 106 MMOL/L (ref 100–108)
CO2 SERPL-SCNC: 22 MMOL/L (ref 21–32)
CREAT SERPL-MCNC: 1.59 MG/DL (ref 0.6–1.3)
GFR SERPL CREATININE-BSD FRML MDRD: 31 ML/MIN/1.73SQ M
GLUCOSE SERPL-MCNC: 109 MG/DL (ref 65–140)
GLUCOSE SERPL-MCNC: 166 MG/DL (ref 65–140)
GLUCOSE SERPL-MCNC: 93 MG/DL (ref 65–140)
GLUCOSE SERPL-MCNC: 95 MG/DL (ref 65–140)
GLUCOSE SERPL-MCNC: 99 MG/DL (ref 65–140)
POTASSIUM SERPL-SCNC: 5.1 MMOL/L (ref 3.5–5.3)
PROT SERPL-MCNC: 6.4 G/DL (ref 6.4–8.2)
SODIUM SERPL-SCNC: 137 MMOL/L (ref 136–145)

## 2022-05-03 PROCEDURE — 80053 COMPREHEN METABOLIC PANEL: CPT | Performed by: PHYSICIAN ASSISTANT

## 2022-05-03 PROCEDURE — 99232 SBSQ HOSP IP/OBS MODERATE 35: CPT | Performed by: PHYSICIAN ASSISTANT

## 2022-05-03 PROCEDURE — 82948 REAGENT STRIP/BLOOD GLUCOSE: CPT

## 2022-05-03 RX ORDER — CEPHALEXIN 500 MG/1
500 CAPSULE ORAL EVERY 6 HOURS SCHEDULED
Status: DISCONTINUED | OUTPATIENT
Start: 2022-05-04 | End: 2022-05-04 | Stop reason: HOSPADM

## 2022-05-03 RX ORDER — CEPHALEXIN 500 MG/1
500 CAPSULE ORAL EVERY 6 HOURS SCHEDULED
Qty: 16 CAPSULE | Refills: 0
Start: 2022-05-04 | End: 2022-05-08

## 2022-05-03 RX ADMIN — FLUTICASONE PROPIONATE 1 SPRAY: 50 SPRAY, METERED NASAL at 09:33

## 2022-05-03 RX ADMIN — TRAZODONE HYDROCHLORIDE 50 MG: 50 TABLET ORAL at 22:16

## 2022-05-03 RX ADMIN — LOSARTAN POTASSIUM 25 MG: 25 TABLET, FILM COATED ORAL at 09:32

## 2022-05-03 RX ADMIN — DONEPEZIL HYDROCHLORIDE 10 MG: 5 TABLET, FILM COATED ORAL at 22:16

## 2022-05-03 RX ADMIN — HYDRALAZINE HYDROCHLORIDE 25 MG: 25 TABLET ORAL at 18:04

## 2022-05-03 RX ADMIN — Medication 125 MG: at 09:32

## 2022-05-03 RX ADMIN — HEPARIN SODIUM 5000 UNITS: 5000 INJECTION INTRAVENOUS; SUBCUTANEOUS at 05:51

## 2022-05-03 RX ADMIN — LORATADINE 5 MG: 10 TABLET ORAL at 09:32

## 2022-05-03 RX ADMIN — CITALOPRAM HYDROBROMIDE 40 MG: 20 TABLET ORAL at 09:32

## 2022-05-03 RX ADMIN — HYDRALAZINE HYDROCHLORIDE 25 MG: 25 TABLET ORAL at 09:32

## 2022-05-03 RX ADMIN — RISPERIDONE 0.25 MG: 0.25 TABLET ORAL at 18:04

## 2022-05-03 RX ADMIN — ASPIRIN 81 MG: 81 TABLET, COATED ORAL at 09:32

## 2022-05-03 RX ADMIN — LATANOPROST 1 DROP: 50 SOLUTION OPHTHALMIC at 22:15

## 2022-05-03 RX ADMIN — PANTOPRAZOLE SODIUM 40 MG: 40 TABLET, DELAYED RELEASE ORAL at 05:51

## 2022-05-03 RX ADMIN — SENNOSIDES AND DOCUSATE SODIUM 1 TABLET: 50; 8.6 TABLET ORAL at 22:15

## 2022-05-03 RX ADMIN — HEPARIN SODIUM 5000 UNITS: 5000 INJECTION INTRAVENOUS; SUBCUTANEOUS at 22:16

## 2022-05-03 RX ADMIN — FERROUS SULFATE TAB 325 MG (65 MG ELEMENTAL FE) 325 MG: 325 (65 FE) TAB at 09:32

## 2022-05-03 RX ADMIN — AMLODIPINE BESYLATE 10 MG: 10 TABLET ORAL at 09:32

## 2022-05-03 RX ADMIN — CEFTRIAXONE SODIUM 1000 MG: 10 INJECTION, POWDER, FOR SOLUTION INTRAVENOUS at 05:51

## 2022-05-03 RX ADMIN — DORZOLAMIDE HYDROCHLORIDE AND TIMOLOL MALEATE 1 DROP: 22.3; 6.8 SOLUTION/ DROPS OPHTHALMIC at 09:33

## 2022-05-03 RX ADMIN — MEMANTINE 10 MG: 10 TABLET ORAL at 18:03

## 2022-05-03 RX ADMIN — DORZOLAMIDE HYDROCHLORIDE AND TIMOLOL MALEATE 1 DROP: 22.3; 6.8 SOLUTION/ DROPS OPHTHALMIC at 22:15

## 2022-05-03 RX ADMIN — RISPERIDONE 0.25 MG: 0.25 TABLET ORAL at 09:31

## 2022-05-03 RX ADMIN — BUPROPION HYDROCHLORIDE 150 MG: 150 TABLET, FILM COATED, EXTENDED RELEASE ORAL at 09:32

## 2022-05-03 RX ADMIN — MULTIPLE VITAMINS W/ MINERALS TAB 1 TABLET: TAB ORAL at 09:32

## 2022-05-03 RX ADMIN — INSULIN LISPRO 1 UNITS: 100 INJECTION, SOLUTION INTRAVENOUS; SUBCUTANEOUS at 22:20

## 2022-05-03 RX ADMIN — HEPARIN SODIUM 5000 UNITS: 5000 INJECTION INTRAVENOUS; SUBCUTANEOUS at 14:47

## 2022-05-03 RX ADMIN — MEMANTINE 10 MG: 10 TABLET ORAL at 09:31

## 2022-05-03 NOTE — ASSESSMENT & PLAN NOTE
· 10-20 white blood cells noted on urinalysis  No fevers  Patient with no complaints or abdominal pain    · Was started on IV ceftriaxone will switch to keflex 500mg PO q6h for four days for outpatient   · Urine Culture showed E coli and susceptibility to keflex

## 2022-05-03 NOTE — ASSESSMENT & PLAN NOTE
· Currently lives with daughter  Needs assistance with all ADLs    Max assist out of bed  · PT, OT   · Gentle Hydration  · Awaiting [facility] for long term care - they can accept tomorrow 5/4

## 2022-05-03 NOTE — CASE MANAGEMENT
Case Management Discharge Planning Note    Patient name Danilo Zuniga  Location S /S -23 MRN [id number]  : 1945 Date 5/3/2022       Current Admission Date: 2022  Current Admission Diagnosis:FTT (failure to thrive) in adult   Patient Active Problem List    Diagnosis Date Noted    UTI (urinary tract infection) 2022    FTT (failure to thrive) in adult 2022    Non-traumatic rhabdomyolysis 2022    Medicare annual wellness visit, subsequent 2022    CKD (chronic kidney disease) 2021    Vision impairment 2021    Leg swelling 10/11/2021    Low bicarbonate level 2021    Urine incontinence 2021    Slow transit constipation 2021    Vertigo 2020    Essential hypertension 2020    Anxiety 2020    Gait instability 2020    Polypharmacy 10/06/2020    Bilateral primary osteoarthritis of knee 2020    Other hyperlipidemia 2020    Type 2 diabetes mellitus with stage 3b chronic kidney disease and hypertension ([facility] [address] ) 2020    History of recurrent UTIs 2020    Sleep difficulties 2020    Late onset Alzheimer's disease without behavioral disturbance ([facility] [address] ) 2020    Moderate episode of recurrent major depressive disorder ([facility] [address] ) 2020    Vitamin D deficiency 2020      LOS (days): 3  Geometric Mean LOS (GMLOS) (days): 3 10  Days to GMLOS:0 5     OBJECTIVE:  Risk of Unplanned Readmission Score: 24         Current admission status: Inpatient   Preferred Pharmacy:   [facility]/pharmacy #[id number]- [address] [address] - [address] [place] [address]  Phone: [phone] Fax: [phone]    [address] [address]  Phone: [phone] Fax: [phone]    Primary Care Provider: Debby Colon MD    Primary Insurance: MEDICARE  Secondary Insurance: [address] [place] [address] DETAILS:     CM contacted pt's daughter to review with her the 2 facilities that are able to accept pt ([facility] and [facility])  She stated she has a meeting at 1000 and then would like to speak with her brothers to determine where they would like her to go  Daughter stated she will call CM back between 1100 and 1200  CM will continue to follow to assist with needs and planning as pt is stable for DC  CM reviewed this with daughter as well

## 2022-05-03 NOTE — DISCHARGE SUMMARY
[facility]  Discharge- [place] 1945, 77 y o  female MRN: [id number]  Unit/Bed#: S -01 Encounter: [id number]  Primary Care Provider: Mitra Beyer MD   Date and time admitted to hospital: 4/30/2022  3:50 PM    * FTT (failure to thrive) in adult  Assessment & Plan  · Currently lives with daughter  Needs assistance with all ADLs  Max assist out of bed  · PT, OT   · Gentle Hydration  · Awaiting [facility] for long term care -they were unable to accept yesterday 5/3, however can accept today  UTI (urinary tract infection)  Assessment & Plan  · 10-20 white blood cells noted on urinalysis  No fevers  Patient with no complaints or abdominal pain  · Was started on IV ceftriaxone will switch to keflex 500mg PO q6h for four days for outpatient   · Urine Culture showed E coli and susceptibility to keflex    Non-traumatic rhabdomyolysis  Assessment & Plan  · Gentle fluid hydration  · Trend CK    CKD (chronic kidney disease)  Assessment & Plan  · Creatinine stable and at baseline  · Retention protocol    Essential hypertension  Assessment & Plan  · BP stable  · PTA: Amlodipine 10 mg qd, hydralazine 25 mg TID, Losartan 25 mg QD, lasix 10 mg QD     Gait instability  Assessment & Plan  · Originally presented to ED falling fall  Slide from shower chair to knees  On ASA  No head stroke or LOC per ED  · Chronic gait instability in setting of OA, deconditioning  · Head CT negative  · PT OT  · Fall precautions    Type 2 diabetes mellitus with stage 3b chronic kidney disease and hypertension ([facility] [address] )  Assessment & Plan  HbA1c is 5 8  Lab Results   Component Value Date    HGBA1C 5 8 (H) 04/30/2022   On Bianka Holland as outpatient     Accucheck, SSI    Moderate episode of recurrent major depressive disorder (HCC)  Assessment & Plan  · Continue Wellbutrin, celexa, risperdal, trazodone  · Follows with psychiatry as outpatient    Late onset Alzheimer's disease without behavioral disturbance [facility])  Assessment & Plan  · Patient is pleasant  Alert to self  Follows commands, moves all extremities  No focal deficits  · Continue PTA meds: Iva Her,    Medical Problems             Resolved Problems  Date Reviewed: 5/3/2022    None              Discharging Physician / Practitioner: Sammie Welch PA-C  PCP: Alejandra Morel MD  Admission Date:   Admission Orders (From admission, onward)     Ordered        04/30/22 1943  INPATIENT ADMISSION  Once                      Discharge Date: 05/04/22    Consultations During Hospital Stay:  · PT/OT    Procedures Performed:   · None    Significant Findings / Test Results:   · Urine Culture- E coli with susceptibility to keflex     Incidental Findings:   · None    Test Results Pending at Discharge (will require follow up): · None     Outpatient Tests Requested:  · None    Complications:  None    Reason for Admission: Generalized weakness that resulted in the patient slipping out of her shower chair and on to her knees     Hospital Course:   Raúl Salazar is a 77 y o  female patient who originally presented to the hospital on 4/30/2022 due to failure to thrive and urinary tract infection  The daughter reports that the patient has been increasingly weak  Before admission she slid off her shower chair and on to her knees  At time of admission the a CT of the head was obtained and showed no acute intracranial abnormalities  Additionally, a venous doppler was completed and showed no evidence of thrombosis  The daughter reported the patient was being treated prior to admission for a UTI so a UA was conducted which showed innumerable bacteria  The patient then received three days of IV Rocephin  Lastly, a urine culture showed E coli that is susceptible to keflex  She should continue PO keflex 500mg Q6H for the next 4 days  Please see above list of diagnoses and related plan for additional information       Condition at Discharge: fair    Discharge Day Visit / Exam:   Subjective:  Patient feels well today  She denies nausea, vomiting, chills, shortness of breath, chest pain, or palpitations  Vitals: Blood Pressure: 109/57 (05/03/22 2302)  Pulse: 78 (05/03/22 2302)  Temperature: 98 7 °F (37 1 °C) (05/03/22 2302)  Temp Source: Oral (05/03/22 1428)  Respirations: 16 (05/03/22 2302)  Weight - Scale: 86 7 kg (191 lb 2 2 oz) (05/04/22 0548)  SpO2: 93 % (05/03/22 2302)  Exam:   Physical Exam  Vitals and nursing note reviewed  Constitutional:       General: She is not in acute distress  Appearance: Normal appearance  She is not toxic-appearing  HENT:      Head: Normocephalic  Right Ear: External ear normal       Left Ear: External ear normal       Nose: Nose normal       Mouth/Throat:      Mouth: Mucous membranes are moist       Pharynx: Oropharynx is clear  Eyes:      General: No scleral icterus  Conjunctiva/sclera: Conjunctivae normal       Pupils: Pupils are equal, round, and reactive to light  Cardiovascular:      Rate and Rhythm: Normal rate and regular rhythm  Pulses: Normal pulses  Heart sounds: Normal heart sounds  No murmur heard  No friction rub  No gallop  Pulmonary:      Effort: Pulmonary effort is normal  No respiratory distress  Breath sounds: Normal breath sounds  No stridor  No wheezing, rhonchi or rales  Abdominal:      General: Bowel sounds are normal       Palpations: Abdomen is soft  Musculoskeletal:         General: No swelling or tenderness  Normal range of motion  Cervical back: Neck supple  Right lower leg: No edema  Left lower leg: No edema  Skin:     General: Skin is warm  Capillary Refill: Capillary refill takes less than 2 seconds  Coloration: Skin is not jaundiced or pale  Findings: No bruising, erythema or lesion  Neurological:      Mental Status: She is alert  Mental status is at baseline     Psychiatric:         Mood and Affect: Mood normal           Discussion with Family: Updated  (daughter) via phone  Discharge instructions/Information to patient and family:   See after visit summary for information provided to patient and family  Provisions for Follow-Up Care:  See after visit summary for information related to follow-up care and any pertinent home health orders  Disposition:   [address] Transfer to [facility]    Planned Readmission: No     Discharge Statement:  I spent 45 minutes discharging the patient  This time was spent on the day of discharge  I had direct contact with the patient on the day of discharge  Greater than 50% of the total time was spent examining patient, answering all patient questions, arranging and discussing plan of care with patient as well as directly providing post-discharge instructions  Additional time then spent on discharge activities  Discharge Medications:  See after visit summary for reconciled discharge medications provided to patient and/or family        **Please Note: This note may have been constructed using a voice recognition system**

## 2022-05-03 NOTE — ASSESSMENT & PLAN NOTE
HbA1c is 5 8  Lab Results   Component Value Date    HGBA1C 5 8 (H) 04/30/2022   On [facility] as outpatient     Accucheck, SSI

## 2022-05-03 NOTE — PLAN OF CARE
Problem: MOBILITY - ADULT  Goal: Maintain or return to baseline ADL function  Description: INTERVENTIONS:  -  Assess patient's ability to carry out ADLs; assess patient's baseline for ADL function and identify physical deficits which impact ability to perform ADLs (bathing, care of mouth/teeth, toileting, grooming, dressing, etc )  - Assess/evaluate cause of self-care deficits   - Assess range of motion  - Assess patient's mobility; develop plan if impaired  - Assess patient's need for assistive devices and provide as appropriate  - Encourage maximum independence but intervene and supervise when necessary  - Involve family in performance of ADLs  - Assess for home care needs following discharge   - Consider OT consult to assist with ADL evaluation and planning for discharge  - Provide patient education as appropriate  Outcome: Progressing  Goal: Maintains/Returns to pre admission functional level  Description: INTERVENTIONS:  - Perform BMAT or MOVE assessment daily    - Set and communicate daily mobility goal to care team and patient/family/caregiver     - Collaborate with rehabilitation services on mobility goals if consulted  - Out of bed for toileting  - Record patient progress and toleration of activity level   Outcome: Progressing     Problem: Potential for Falls  Goal: Patient will remain free of falls  Description: INTERVENTIONS:  - Educate patient/family on patient safety including physical limitations  - Instruct patient to call for assistance with activity   - Consult OT/PT to assist with strengthening/mobility   - Keep Call bell within reach  - Keep bed low and locked with side rails adjusted as appropriate  - Keep care items and personal belongings within reach  - Initiate and maintain comfort rounds  - Make Fall Risk Sign visible to staff  - Apply yellow socks and bracelet for high fall risk patients  - Consider moving patient to room near nurses station  Outcome: Progressing     Problem: Prexisting or High Potential for Compromised Skin Integrity  Goal: Skin integrity is maintained or improved  Description: INTERVENTIONS:  - Identify patients at risk for skin breakdown  - Assess and monitor skin integrity  - Assess and monitor nutrition and hydration status  - Monitor labs   - Assess for incontinence   - Turn and reposition patient  - Assist with mobility/ambulation  - Relieve pressure over bony prominences  - Avoid friction and shearing  - Provide appropriate hygiene as needed including keeping skin clean and dry  - Evaluate need for skin moisturizer/barrier cream  - Collaborate with interdisciplinary team   - Patient/family teaching  - Consider wound care consult   Outcome: Progressing     Problem: PAIN - ADULT  Goal: Verbalizes/displays adequate comfort level or baseline comfort level  Description: Interventions:  - Encourage patient to monitor pain and request assistance  - Assess pain using appropriate pain scale  - Administer analgesics based on type and severity of pain and evaluate response  - Implement non-pharmacological measures as appropriate and evaluate response  - Consider cultural and social influences on pain and pain management  - Notify physician/advanced practitioner if interventions unsuccessful or patient reports new pain  Outcome: Progressing     Problem: INFECTION - ADULT  Goal: Absence or prevention of progression during hospitalization  Description: INTERVENTIONS:  - Assess and monitor for signs and symptoms of infection  - Monitor lab/diagnostic results  - Monitor all insertion sites, i e  indwelling lines, tubes, and drains  - Monitor endotracheal if appropriate and nasal secretions for changes in amount and color  - [place] appropriate cooling/warming therapies per order  - Administer medications as ordered  - Instruct and encourage patient and family to use good hand hygiene technique  - Identify and instruct in appropriate isolation precautions for identified infection/condition  Outcome: Progressing  Goal: Absence of fever/infection during neutropenic period  Description: INTERVENTIONS:  - Monitor WBC    Outcome: Progressing     Problem: SAFETY ADULT  Goal: Maintain or return to baseline ADL function  Description: INTERVENTIONS:  -  Assess patient's ability to carry out ADLs; assess patient's baseline for ADL function and identify physical deficits which impact ability to perform ADLs (bathing, care of mouth/teeth, toileting, grooming, dressing, etc )  - Assess/evaluate cause of self-care deficits   - Assess range of motion  - Assess patient's mobility; develop plan if impaired  - Assess patient's need for assistive devices and provide as appropriate  - Encourage maximum independence but intervene and supervise when necessary  - Involve family in performance of ADLs  - Assess for home care needs following discharge   - Consider OT consult to assist with ADL evaluation and planning for discharge  - Provide patient education as appropriate  Outcome: Progressing  Goal: Maintains/Returns to pre admission functional level  Description: INTERVENTIONS:  - Perform BMAT or MOVE assessment daily    - Set and communicate daily mobility goal to care team and patient/family/caregiver     - Collaborate with rehabilitation services on mobility goals if consulted  - Out of bed for toileting  - Record patient progress and toleration of activity level   Outcome: Progressing  Goal: Patient will remain free of falls  Description: INTERVENTIONS:  - Educate patient/family on patient safety including physical limitations  - Instruct patient to call for assistance with activity   - Consult OT/PT to assist with strengthening/mobility   - Keep Call bell within reach  - Keep bed low and locked with side rails adjusted as appropriate  - Keep care items and personal belongings within reach  - Initiate and maintain comfort rounds  - Make Fall Risk Sign visible to staff  - Apply yellow socks and bracelet for high fall risk patients  - Consider moving patient to room near nurses station  Outcome: Progressing     Problem: DISCHARGE PLANNING  Goal: Discharge to home or other facility with appropriate resources  Description: INTERVENTIONS:  - Identify barriers to discharge w/patient and caregiver  - Arrange for needed discharge resources and transportation as appropriate  - Identify discharge learning needs (meds, wound care, etc )  - Arrange for interpretive services to assist at discharge as needed  - Refer to Case Management Department for coordinating discharge planning if the patient needs post-hospital services based on physician/advanced practitioner order or complex needs related to functional status, cognitive ability, or social support system  Outcome: Progressing     Problem: GENITOURINARY - ADULT  Goal: Absence of urinary retention  Description: INTERVENTIONS:  - Assess patients ability to void and empty bladder  - Monitor I/O  - Bladder scan as needed  - Discuss with physician/AP medications to alleviate retention as needed  - Discuss catheterization for long term situations as appropriate  Outcome: Progressing     Problem: METABOLIC, FLUID AND ELECTROLYTES - ADULT  Goal: Electrolytes maintained within normal limits  Description: INTERVENTIONS:  - Monitor labs and assess patient for signs and symptoms of electrolyte imbalances  - Administer electrolyte replacement as ordered  - Monitor response to electrolyte replacements, including repeat lab results as appropriate  - Instruct patient on fluid and nutrition as appropriate  Outcome: Progressing  Goal: Fluid balance maintained  Description: INTERVENTIONS:  - Monitor labs   - Monitor I/O and WT  - Instruct patient on fluid and nutrition as appropriate  - Assess for signs & symptoms of volume excess or deficit  Outcome: Progressing  Goal: Glucose maintained within target range  Description: INTERVENTIONS:  - Monitor Blood Glucose as ordered  - Assess for signs and symptoms of hyperglycemia and hypoglycemia  - Administer ordered medications to maintain glucose within target range  - Assess nutritional intake and initiate nutrition service referral as needed  Outcome: Progressing

## 2022-05-03 NOTE — PROGRESS NOTES
[facility]  Progress Note - Leonela [place]more 1945, 77 y o  female MRN: [id number]  Unit/Bed#: S -01 Encounter: [id number]  Primary Care Provider: Francisco Govea MD   Date and time admitted to hospital: 4/30/2022  3:50 PM    * FTT (failure to thrive) in adult  Assessment & Plan  · Currently lives with daughter  Needs assistance with all ADLs  Max assist out of bed  · PT, OT   · Gentle Hydration  · [facility] - they can accept tomorrow 5/4    UTI (urinary tract infection)  Assessment & Plan  · 10-20 white blood cells noted on urinalysis  No fevers  Patient with no complaints or abdominal pain  · Was started on IV ceftriaxone will switch to keflex 500mg PO q6h for four days for outpatient   · Urine Culture showed E coli and susceptibility to keflex    Non-traumatic rhabdomyolysis  Assessment & Plan  · Gentle fluid hydration  · Trend CK    CKD (chronic kidney disease)  Assessment & Plan  · Creatinine stable and at baseline  · Retention protocol    Essential hypertension  Assessment & Plan  · BP stable  · PTA: Amlodipine 10 mg qd, hydralazine 25 mg TID, Losartan 25 mg QD, lasix 10 mg QD     Gait instability  Assessment & Plan  · Originally presented to ED falling fall  Slide from shower chair to knees  On ASA  No head stroke or LOC per ED  · Chronic gait instability in setting of OA, deconditioning  · Head CT negative  · PT OT  · Fall precautions    Type 2 diabetes mellitus with stage 3b chronic kidney disease and hypertension ([facility] [address] )  Assessment & Plan  HbA1c is 5 8  Lab Results   Component Value Date    HGBA1C 5 8 (H) 04/30/2022   On Estelle Mon as outpatient     Accucheck, SSI    Moderate episode of recurrent major depressive disorder (HCC)  Assessment & Plan  · Continue Wellbutrin, celexa, risperdal, trazodone  · Follows with psychiatry as outpatient    [facility]  Assessment & Plan  · Patient is pleasant  Alert to self  Follows commands, moves all extremities  No focal deficits  · Continue PTA meds: aricept, namenda,        VTE Pharmacologic Prophylaxis: VTE Score: 4 Moderate Risk (Score 3-4) - Pharmacological DVT Prophylaxis Ordered: heparin  Patient Centered Rounds: I performed bedside rounds with nursing staff today  Discussions with Specialists or Other Care Team Provider: CM, nursing    Education and Discussions with Family / Patient: Attempted to update  (daughter) via phone  Left voicemail  Time Spent for Care: 30 minutes  More than 50% of total time spent on counseling and coordination of care as described above  Current Length of Stay: 3 day(s)  Current Patient Status: Inpatient   Certification Statement: The patient will continue to require additional inpatient hospital stay due to awaiting acceptance at rehab  Discharge Plan: Anticipate discharge tomorrow to rehab facility  Code Status: Level 3 - DNAR and DNI    Subjective:   No overnight events  Mentation at baseline  Objective:     Vitals:   Temp (24hrs), Av 4 °F (36 9 °C), Min:98 4 °F (36 9 °C), Max:98 4 °F (36 9 °C)    Temp:  [98 4 °F (36 9 °C)] 98 4 °F (36 9 °C)  HR:  [62-72] 62  Resp:  [18] 18  BP: (120-161)/(62-74) 161/74  SpO2:  [93 %-97 %] 97 %  Body mass index is 32 47 kg/m²  Input and Output Summary (last 24 hours): Intake/Output Summary (Last 24 hours) at 5/3/2022 1231  Last data filed at 5/3/2022 0901  Gross per 24 hour   Intake 125 ml   Output 1700 ml   Net -1575 ml       Physical Exam:   Physical Exam  Vitals and nursing note reviewed  Constitutional:       General: She is not in acute distress  Appearance: Normal appearance  HENT:      Head: Normocephalic  Mouth/Throat:      Mouth: Mucous membranes are moist    Eyes:      General: No scleral icterus  Pupils: Pupils are equal, round, and reactive to light     Cardiovascular:      Rate and Rhythm: Normal rate and regular rhythm  Heart sounds: No murmur heard  Pulmonary:      Effort: Pulmonary effort is normal  No respiratory distress  Breath sounds: Normal breath sounds  No wheezing, rhonchi or rales  Abdominal:      General: Bowel sounds are normal  There is no distension  Palpations: Abdomen is soft  Tenderness: There is no abdominal tenderness  Musculoskeletal:         General: No swelling  Right lower leg: No edema  Left lower leg: No edema  Skin:     Capillary Refill: Capillary refill takes less than 2 seconds  Neurological:      General: No focal deficit present  Mental Status: She is alert  Mental status is at baseline  She is disoriented            Additional Data:     Labs:  Results from last 7 days   Lab Units 05/02/22  0443   WBC Thousand/uL 6 61   HEMOGLOBIN g/dL 10 4*   HEMATOCRIT % 34 1*   PLATELETS Thousands/uL 296   NEUTROS PCT % 67   LYMPHS PCT % 19   MONOS PCT % 11   EOS PCT % 1     Results from last 7 days   Lab Units 05/03/22  0552   SODIUM mmol/L 137   POTASSIUM mmol/L 5 1   CHLORIDE mmol/L 106   CO2 mmol/L 22   BUN mg/dL 23   CREATININE mg/dL 1 59*   ANION GAP mmol/L 9   CALCIUM mg/dL 9 3   ALBUMIN g/dL 2 4*   TOTAL BILIRUBIN mg/dL 0 38   ALK PHOS U/L 100   ALT U/L 25   AST U/L 47*   GLUCOSE RANDOM mg/dL 93     Results from last 7 days   Lab Units 04/30/22  1818   INR  0 94     Results from last 7 days   Lab Units 05/03/22  1106 05/03/22  0740 05/02/22  2114 05/02/22  1520 05/02/22  1107 05/02/22  0735 05/01/22  2133 05/01/22  1631 05/01/22  1112 05/01/22  0725 04/30/22  2243   POC GLUCOSE mg/dl 95 109 137 123 124 145* 173* 147* 110 98 121     Results from last 7 days   Lab Units 04/30/22  1711   HEMOGLOBIN A1C % 5 8*     Results from last 7 days   Lab Units 05/01/22  1253   LACTIC ACID mmol/L 0 6       Lines/Drains:  Invasive Devices  Report    Peripheral Intravenous Line            Peripheral IV 04/30/22 Left;Upper Arm 2 days Imaging: No pertinent imaging reviewed      Recent Cultures (last 7 days):   Results from last 7 days   Lab Units 05/01/22  0246   URINE CULTURE  >100,000 cfu/ml Escherichia coli*  30,000-39,000 cfu/ml        Last 24 Hours Medication List:   Current Facility-Administered Medications   Medication Dose Route Frequency Provider Last Rate    acetaminophen  650 mg Oral Q6H PRN Trey Raring, CRNP      amLODIPine  10 mg Oral Daily Trey Raring, CRNP      ascorbic acid  125 mg Oral Daily Trye Raring, CRNP      aspirin  81 mg Oral Daily Trey Raring, CRNP      buPROPion  150 mg Oral Daily Trey Raring, [address]      [START ON 5/4/2022] cephalexin  500 mg Oral Q6H Irina Ceja PA-C      citalopram  40 mg Oral Daily Trey Raring, [address]      donepezil  10 mg Oral HS Trey Raring, CRNP      dorzolamide-timolol  1 drop Both Eyes Q12H [facility] Trey Raring, CRNP      ferrous sulfate  325 mg Oral Daily With Breakfast Trey Raring, CRNP      fluticasone  1 spray Each Nare Daily Trey Raring, CRNP      heparin (porcine)  5,000 Units Subcutaneous [facility] Trey Raring, [address]      hydrALAZINE  25 mg Oral TID Rtey Raring, CRNP      insulin lispro  1-5 Units Subcutaneous HS Trey Raring, CRNP      insulin lispro  1-6 Units Subcutaneous TID [facility] Trey Raring, CRNP      latanoprost  1 drop Both Eyes HS Trey Raring, CRNP      loratadine  5 mg Oral Daily Trey Raring, CRNP      losartan  25 mg Oral Daily Trey Raring, [address]      meclizine  25 mg Oral Q12H PRN Trey Raring, CRNP      memantine  10 mg Oral BID Trey Raring, CRNP      multivitamin-minerals  1 tablet Oral Daily Trey Raring, [address]      pantoprazole  40 mg Oral Early Morning Trey Raring, CRNP      polyethylene glycol  17 g Oral Daily PRN Trey Raring, CRNP      risperiDONE  0 25 mg Oral BID Trey Raring, CRNP      senna-docusate sodium  1 tablet Oral HS Trey Raring, CRNP      traZODone  50 mg Oral HS NESSA Burrell          Today, Patient Was Seen By: Les Puckett PA-C    **Please Note: This note may have been constructed using a voice recognition system  **

## 2022-05-04 ENCOUNTER — TELEPHONE (OUTPATIENT)
Dept: OBGYN CLINIC | Facility: HOSPITAL | Age: 77
End: 2022-05-04

## 2022-05-04 ENCOUNTER — TELEPHONE (OUTPATIENT)
Dept: OBGYN CLINIC | Facility: MEDICAL CENTER | Age: 77
End: 2022-05-04

## 2022-05-04 VITALS
SYSTOLIC BLOOD PRESSURE: 124 MMHG | WEIGHT: 191.14 LBS | DIASTOLIC BLOOD PRESSURE: 75 MMHG | TEMPERATURE: 97.4 F | OXYGEN SATURATION: 97 % | BODY MASS INDEX: 31.81 KG/M2 | RESPIRATION RATE: 18 BRPM | HEART RATE: 68 BPM

## 2022-05-04 LAB
GLUCOSE SERPL-MCNC: 121 MG/DL (ref 65–140)
GLUCOSE SERPL-MCNC: 173 MG/DL (ref 65–140)

## 2022-05-04 PROCEDURE — 99239 HOSP IP/OBS DSCHRG MGMT >30: CPT | Performed by: PHYSICIAN ASSISTANT

## 2022-05-04 PROCEDURE — 82948 REAGENT STRIP/BLOOD GLUCOSE: CPT

## 2022-05-04 RX ADMIN — MEMANTINE 10 MG: 10 TABLET ORAL at 08:51

## 2022-05-04 RX ADMIN — LOSARTAN POTASSIUM 25 MG: 25 TABLET, FILM COATED ORAL at 08:51

## 2022-05-04 RX ADMIN — MULTIPLE VITAMINS W/ MINERALS TAB 1 TABLET: TAB ORAL at 08:51

## 2022-05-04 RX ADMIN — FLUTICASONE PROPIONATE 1 SPRAY: 50 SPRAY, METERED NASAL at 08:51

## 2022-05-04 RX ADMIN — CITALOPRAM HYDROBROMIDE 40 MG: 20 TABLET ORAL at 08:51

## 2022-05-04 RX ADMIN — ASPIRIN 81 MG: 81 TABLET, COATED ORAL at 08:51

## 2022-05-04 RX ADMIN — HEPARIN SODIUM 5000 UNITS: 5000 INJECTION INTRAVENOUS; SUBCUTANEOUS at 05:14

## 2022-05-04 RX ADMIN — FERROUS SULFATE TAB 325 MG (65 MG ELEMENTAL FE) 325 MG: 325 (65 FE) TAB at 08:51

## 2022-05-04 RX ADMIN — CEPHALEXIN 500 MG: 500 CAPSULE ORAL at 00:36

## 2022-05-04 RX ADMIN — Medication 125 MG: at 08:52

## 2022-05-04 RX ADMIN — RISPERIDONE 0.25 MG: 0.25 TABLET ORAL at 08:51

## 2022-05-04 RX ADMIN — PANTOPRAZOLE SODIUM 40 MG: 40 TABLET, DELAYED RELEASE ORAL at 05:14

## 2022-05-04 RX ADMIN — HYDRALAZINE HYDROCHLORIDE 25 MG: 25 TABLET ORAL at 08:51

## 2022-05-04 RX ADMIN — CEPHALEXIN 500 MG: 500 CAPSULE ORAL at 05:14

## 2022-05-04 RX ADMIN — LORATADINE 5 MG: 10 TABLET ORAL at 08:51

## 2022-05-04 RX ADMIN — BUPROPION HYDROCHLORIDE 150 MG: 150 TABLET, FILM COATED, EXTENDED RELEASE ORAL at 08:51

## 2022-05-04 RX ADMIN — DORZOLAMIDE HYDROCHLORIDE AND TIMOLOL MALEATE 1 DROP: 22.3; 6.8 SOLUTION/ DROPS OPHTHALMIC at 08:51

## 2022-05-04 RX ADMIN — AMLODIPINE BESYLATE 10 MG: 10 TABLET ORAL at 08:51

## 2022-05-04 NOTE — TELEPHONE ENCOUNTER
Wendy from [facility] calling for patient, patient has Cellulitis and Paronychia of left thumb  She would like to set appointment with Dr Jorden Litten, sent forced appt to [facility]    Patient is in nursing center, please call [address] [facility] [address] at [phone] or [phone]

## 2022-05-04 NOTE — ASSESSMENT & PLAN NOTE
HbA1c is 5 8  Lab Results   Component Value Date    HGBA1C 5 8 (H) 04/30/2022   On Yobani Alanis as outpatient     Accucheck, SSI

## 2022-05-04 NOTE — PLAN OF CARE
Problem: MOBILITY - ADULT  Goal: Maintain or return to baseline ADL function  Description: INTERVENTIONS:  -  Assess patient's ability to carry out ADLs; assess patient's baseline for ADL function and identify physical deficits which impact ability to perform ADLs (bathing, care of mouth/teeth, toileting, grooming, dressing, etc )  - Assess/evaluate cause of self-care deficits   - Assess range of motion  - Assess patient's mobility; develop plan if impaired  - Assess patient's need for assistive devices and provide as appropriate  - Encourage maximum independence but intervene and supervise when necessary  - Involve family in performance of ADLs  - Assess for home care needs following discharge   - Consider OT consult to assist with ADL evaluation and planning for discharge  - Provide patient education as appropriate  Outcome: Progressing     Problem: Prexisting or High Potential for Compromised Skin Integrity  Goal: Skin integrity is maintained or improved  Description: INTERVENTIONS:  - Identify patients at risk for skin breakdown  - Assess and monitor skin integrity  - Assess and monitor nutrition and hydration status  - Monitor labs   - Assess for incontinence   - Turn and reposition patient  - Assist with mobility/ambulation  - Relieve pressure over bony prominences  - Avoid friction and shearing  - Provide appropriate hygiene as needed including keeping skin clean and dry  - Evaluate need for skin moisturizer/barrier cream  - Collaborate with interdisciplinary team   - Patient/family teaching  - Consider wound care consult   Outcome: Progressing     Problem: PAIN - ADULT  Goal: Verbalizes/displays adequate comfort level or baseline comfort level  Description: Interventions:  - Encourage patient to monitor pain and request assistance  - Assess pain using appropriate pain scale  - Administer analgesics based on type and severity of pain and evaluate response  - Implement non-pharmacological measures as appropriate and evaluate response  - Consider cultural and social influences on pain and pain management  - Notify physician/advanced practitioner if interventions unsuccessful or patient reports new pain  Outcome: Progressing     Problem: INFECTION - ADULT  Goal: Absence or prevention of progression during hospitalization  Description: INTERVENTIONS:  - Assess and monitor for signs and symptoms of infection  - Monitor lab/diagnostic results  - Monitor all insertion sites, i e  indwelling lines, tubes, and drains  - Monitor endotracheal if appropriate and nasal secretions for changes in amount and color  - Grant appropriate cooling/warming therapies per order  - Administer medications as ordered  - Instruct and encourage patient and family to use good hand hygiene technique  - Identify and instruct in appropriate isolation precautions for identified infection/condition  Outcome: Progressing     Problem: DISCHARGE PLANNING  Goal: Discharge to home or other facility with appropriate resources  Description: INTERVENTIONS:  - Identify barriers to discharge w/patient and caregiver  - Arrange for needed discharge resources and transportation as appropriate  - Identify discharge learning needs (meds, wound care, etc )  - Arrange for interpretive services to assist at discharge as needed  - Refer to Case Management Department for coordinating discharge planning if the patient needs post-hospital services based on physician/advanced practitioner order or complex needs related to functional status, cognitive ability, or social support system  Outcome: Progressing     Problem: GENITOURINARY - ADULT  Goal: Absence of urinary retention  Description: INTERVENTIONS:  - Assess patients ability to void and empty bladder  - Monitor I/O  - Bladder scan as needed  - Discuss with physician/AP medications to alleviate retention as needed  - Discuss catheterization for long term situations as appropriate  Outcome: Progressing Problem: METABOLIC, FLUID AND ELECTROLYTES - ADULT  Goal: Electrolytes maintained within normal limits  Description: INTERVENTIONS:  - Monitor labs and assess patient for signs and symptoms of electrolyte imbalances  - Administer electrolyte replacement as ordered  - Monitor response to electrolyte replacements, including repeat lab results as appropriate  - Instruct patient on fluid and nutrition as appropriate  Outcome: Progressing     Problem: METABOLIC, FLUID AND ELECTROLYTES - ADULT  Goal: Fluid balance maintained  Description: INTERVENTIONS:  - Monitor labs   - Monitor I/O and WT  - Instruct patient on fluid and nutrition as appropriate  - Assess for signs & symptoms of volume excess or deficit  Outcome: Progressing     Problem: METABOLIC, FLUID AND ELECTROLYTES - ADULT  Goal: Glucose maintained within target range  Description: INTERVENTIONS:  - Monitor Blood Glucose as ordered  - Assess for signs and symptoms of hyperglycemia and hypoglycemia  - Administer ordered medications to maintain glucose within target range  - Assess nutritional intake and initiate nutrition service referral as needed  Outcome: Progressing

## 2022-05-04 NOTE — CASE MANAGEMENT
Case Management Discharge Planning Note    Patient name Lisa Zarate  Location S /S -75 MRN [id number]  : 1945 Date 2022       Current Admission Date: 2022  Current Admission Diagnosis:FTT (failure to thrive) in adult   Patient Active Problem List    Diagnosis Date Noted    UTI (urinary tract infection) 2022    FTT (failure to thrive) in adult 2022    Non-traumatic rhabdomyolysis 2022    Medicare annual wellness visit, subsequent 2022    CKD (chronic kidney disease) 2021    Vision impairment 2021    Leg swelling 10/11/2021    Low bicarbonate level 2021    Urine incontinence 2021    Slow transit constipation 2021    Vertigo 2020    Essential hypertension 2020    Anxiety 2020    Gait instability 2020    Polypharmacy 10/06/2020    Bilateral primary osteoarthritis of knee 2020    Other hyperlipidemia 2020    Type 2 diabetes mellitus with stage 3b chronic kidney disease and hypertension ([facility] ) 2020    History of recurrent UTIs 2020    Sleep difficulties 2020    Late onset Alzheimer's disease without behavioral disturbance ([facility] ) 2020    Moderate episode of recurrent major depressive disorder ([facility] ) 2020    Vitamin D deficiency 2020      LOS (days): 4  Geometric Mean LOS (GMLOS) (days): 3 10  Days to GMLOS:-0 5     OBJECTIVE:  Risk of Unplanned Readmission Score: 24         Current admission status: Inpatient   Preferred Pharmacy:   [facility]/pharmacy #[id number]- ABIGAIL Mai - [address] [place] [address] ROAD  [address] [place] [address]  Phone: [phone] Fax: [phone]    [address] [place] [address]  Phone: [phone] Fax: [phone]    Primary Care Provider: Alok Mccloud MD    Primary Insurance: MEDICARE  Secondary Insurance: [address] [place] [address] DETAILS:     Transport at Discharge : [facility]  Dispatcher Contacted: Yes        ETA of Transport (Date): 05/04/22  ETA of Transport (Time): 1230     Transfer Mode: Stretcher  Accompanied by: EMS personnel  Transfer Equipment: [id number] Name, [address] : Wilber Cassidy  Receiving Facility/Agency Phone Number: [phone]  Facility/Agency Fax Number: [phone]

## 2022-05-04 NOTE — CASE MANAGEMENT
Case Management Progress Note    Patient name Peña Matthews  Location S /S -01 MRN [id number]  : 1945 Date 2022       LOS (days): 4  Geometric Mean LOS (GMLOS) (days): 3 10  Days to GMLOS:-0 5        OBJECTIVE:        Current admission status: Inpatient  Preferred Pharmacy:   [id number]- [facility] [place] [address]  Phone: [phone] Fax: [phone]    [address]   [address] [address]  Phone: [phone] Fax: [phone]    Primary Care Provider: Malu Decker MD    Primary Insurance: MEDICARE  [id number]    PROGRESS NOTE:    Weekly Care Management Length of Stay Review     Current LOS: 4    Most Recent Labs:     Lab Results   Component Value Date/Time    WBC 6 61 2022 04:43 AM    HGB 10 4 (L) 2022 04:43 AM    HCT 34 1 (L) 2022 04:43 AM     2022 04:43 AM    SODIUM 137 2022 05:52 AM    K 5 1 2022 05:52 AM     2022 05:52 AM    CO2 22 2022 05:52 AM    BUN 23 2022 05:52 AM    CREATININE 1 59 (H) 2022 05:52 AM    GLUC 93 2022 05:52 AM    ALKPHOS 100 2022 05:52 AM    ALT 25 2022 05:52 AM    AST 47 (H) 2022 05:52 AM    ALB 2 4 (L) 2022 05:52 AM    TBILI 0 38 2022 05:52 AM       Most Recent Vitals:   Vitals:    22 0730   BP: 124/75   Pulse: 68   Resp: 18   Temp: (!) 97 4 °F (36 3 °C)   SpO2: 97%        Brief Care Summary & Need for Continued Stay[de-identified] FTT, LTC placement    Intended Discharge Disposition: LTC SNF  - Uday Mora  Expected Discharge Date: Today    Current Identified Barriers to Discharge & Delays[de-identified]  Facility only taking 2 admission per day and they could not accommodate her yesterday      D/C Goals/ CM Interventions[de-identified]

## 2022-05-04 NOTE — ASSESSMENT & PLAN NOTE
· Currently lives with daughter  Needs assistance with all ADLs  Max assist out of bed  · PT, OT   · Gentle Hydration  · [facility]waiting [facility] for [facility] term care -they were unable to accept yesterday 5/3, however can accept today

## 2022-05-04 NOTE — CASE MANAGEMENT
Case Management Discharge Planning Note    Patient name Viviano Essex  Location S /S -89 MRN [id number]  : 1945 Date 2022       Current Admission Date: 2022  Current Admission Diagnosis:FTT (failure to thrive) in adult   Patient Active Problem List    Diagnosis Date Noted    UTI (urinary tract infection) 2022    FTT (failure to thrive) in adult 2022    Non-traumatic rhabdomyolysis 2022    Medicare annual wellness visit, subsequent 2022    CKD (chronic kidney disease) 2021    Vision impairment 2021    Leg swelling 10/11/2021    Low bicarbonate level 2021    Urine incontinence 2021    Slow transit constipation 2021    Vertigo 2020    Essential hypertension 2020    Anxiety 2020    Gait instability 2020    Polypharmacy 10/06/2020    Bilateral primary osteoarthritis of knee 2020    Other hyperlipidemia 2020    Type 2 diabetes mellitus with stage 3b chronic kidney disease and hypertension ([facility] ) 2020    History of recurrent UTIs 2020    Sleep difficulties 2020    Late onset Alzheimer's disease without behavioral disturbance ([facility] ) 2020    Moderate episode of recurrent major depressive disorder ([facility] ) 2020    Vitamin D deficiency 2020      LOS (days): 4  Geometric Mean LOS (GMLOS) (days): 3 10  Days to GMLOS:-0 5     OBJECTIVE:  Risk of Unplanned Readmission Score: 24         Current admission status: Inpatient   Preferred Pharmacy:   [facility]/pharmacy #[id number]- [place] Cook - [address]  [address] [place] [address]  Phone: [phone] Fax: [phone]    [address] [address]  Phone: [phone] Fax: [phone]    Primary Care Provider: Oliver Zarate MD    Primary Insurance: MEDICARE  Secondary Insurance: [address] [place] [address] DETAILS:     Cm received email from pt's daughter and family who state they would like to have pt go to [facility]  In speaking with [facility], they are at their cap for admissions today and pt will need to be discharged to their facility tomorrow  This has been explained to family and SLIM AP  Pt will DC tomorrow to [facility]

## 2022-05-08 DIAGNOSIS — I10 ESSENTIAL HYPERTENSION: ICD-10-CM

## 2022-05-08 RX ORDER — HYDRALAZINE HYDROCHLORIDE 25 MG/1
TABLET, FILM COATED ORAL
Qty: 270 TABLET | Refills: 1 | Status: SHIPPED | OUTPATIENT
Start: 2022-05-08

## 2022-05-18 ENCOUNTER — TELEPHONE (OUTPATIENT)
Dept: OBGYN CLINIC | Facility: MEDICAL CENTER | Age: 77
End: 2022-05-18

## 2022-05-18 NOTE — TELEPHONE ENCOUNTER
Dr Vinson   RE: Appt   CB#: [phone] Harriet Orona)       Shannon Novak from [address] called into the office, she states the patient currently has covid and is not cleared till the 20th  She will need to be rescheduled till after that date  Or if a virtual can be done   Please call back and have them page Shannon Novak

## 2022-05-18 NOTE — TELEPHONE ENCOUNTER
Emilio Adams at [facility] calling about patient appointment, she wants to check with the infectious disease department, she has been quaratined with covid, think quarantine is over 10 days on 5/19/2022, will call back

## 2022-05-25 ENCOUNTER — OFFICE VISIT (OUTPATIENT)
Dept: OBGYN CLINIC | Facility: CLINIC | Age: 77
End: 2022-05-25
Payer: MEDICARE

## 2022-05-25 VITALS — WEIGHT: 191 LBS | HEIGHT: 65 IN | BODY MASS INDEX: 31.82 KG/M2

## 2022-05-25 DIAGNOSIS — M79.642 BILATERAL HAND PAIN: Primary | ICD-10-CM

## 2022-05-25 DIAGNOSIS — M79.641 BILATERAL HAND PAIN: Primary | ICD-10-CM

## 2022-05-25 PROCEDURE — 99214 OFFICE O/P EST MOD 30 MIN: CPT | Performed by: SURGERY

## 2022-05-25 NOTE — PROGRESS NOTES
Lior FINCH  Attending, Orthopaedic Surgery  Hand, Wrist, and Elbow Surgery  [facility]      ORTHOPAEDIC HAND, WRIST, AND ELBOW OFFICE  VISIT       ASSESSMENT/PLAN:      67-year-old female with nonspecific bilateral hand pain  There are no concerning signs for infection today on exam   She has nonspecific tenderness about her bilateral hands  I did recommend that she use heat Tylenol as needed for pain  She may perform activities as tolerated  These instructions were provided for her care facility  I will see her back on an as-needed basis  The patient verbalized understanding of exam findings and treatment plan  We engaged in the shared decision-making process and treatment options were discussed at length with the patient  Surgical and conservative management discussed today along with risks and benefits  There are no diagnoses linked to this encounter  Follow Up:  Return if symptoms worsen or fail to improve  To Do Next Visit:           ____________________________________________________________________________________________________________________________________________      CHIEF COMPLAINT:  Chief Complaint   Patient presents with    Left Thumb - Pain       SUBJECTIVE:  Leyda Aguayo is a 77y o  year old  female who presents to the office today from a nursing facility  She is accompanied by the transport team   Patient has history Alzheimer's and is unable to give a full history  Based on a telephone note in early May there was concern that she had redness about her left thumb possibly due to a paronychia or cellulitis  She did have COVID at that time I was unable to be seen until today  Patient states she occasionally has bilateral hand pain  This is nonspecific and patient is unable to verbalize when the pain occurs        I have personally reviewed all the relevant PMH, PSH, SH, FH, Medications and allergies      PAST MEDICAL HISTORY:  Past Medical History:   Diagnosis Date    Acute cystitis without hematuria 2020    Allergic rhinitis 2021    Anxiety     Arm swelling 2021    Cholesterol depletion     Confusion     Confusion 2021    D[facility]     Depression     Diabetes (HCC)     Early onset Alzheimer's dementia ([facility] )     Encounter for screening colonoscopy 2020    Glaucoma     Hypertension     Hypertension     Memory loss     Right leg swelling 2021       PAST SURGICAL HISTORY:  Past Surgical History:   Procedure Laterality Date    CYSTOSCOPY  06/10/2021    HYSTERECTOMY         FAMILY HISTORY:  Family History   Problem Relation Age of Onset    Diabetes Mother     Hypertension Mother        SOCIAL HISTORY:  Social History     Tobacco Use    Smoking status: Former Smoker     Types: Cigarettes     Quit date: 1990     Years since quittin 8    Smokeless tobacco: Never Used   Vaping Use    Vaping Use: Never used   Substance Use Topics    Alcohol use: Not Currently    Drug use: Never       MEDICATIONS:    Current Outpatient Medications:     amLODIPine (NORVASC) 10 mg tablet, TAKE 1 TABLET BY MOUTH EVERY DAY, Disp: 90 tablet, Rfl: 1    Ascorbic Acid (VITAMIN C) 100 MG tablet, Take 100 mg by mouth daily, Disp: , Rfl:     aspirin (ECOTRIN LOW STRENGTH) 81 mg EC tablet, Take 81 mg by mouth daily, Disp: , Rfl:     atorvastatin (LIPITOR) 40 mg tablet, Take 1 tablet (40 mg total) by mouth daily, Disp: 90 tablet, Rfl: 2    Azopt 1 % ophthalmic suspension, , Disp: , Rfl:     Brimonidine Tartrate-Timolol (COMBIGAN OP), Apply to eye, Disp: , Rfl:     buPROPion (WELLBUTRIN SR) 150 mg 12 hr tablet, Take 1 tablet (150 mg total) by mouth 2 (two) times a day, Disp: 180 tablet, Rfl: 2    Calcifediol ER (Rayaldee) 30 MCG CPCR, Take 30 mcg by mouth daily, Disp: 14 capsule, Rfl: 0    cetirizine (ZyrTEC) 10 mg tablet, Take 10 mg by mouth daily, Disp: , Rfl:     citalopram (CeleXA) 40 mg tablet, Take 1 tablet (40 mg total) by mouth daily, Disp: 90 tablet, Rfl: 2    diclofenac sodium (VOLTAREN) 1 %, Apply 2 g topically 3 (three) times a day as needed (Pain), Disp: 100 g, Rfl: 1    donepezil (ARICEPT) 10 mg tablet, Take 1 tablet (10 mg total) by mouth daily at bedtime, Disp: 180 tablet, Rfl: 0    dorzolamide (TRUSOPT) 2 % ophthalmic solution, 1 drop 3 (three) times a day, Disp: , Rfl:     ferrous sulfate 325 (65 Fe) mg tablet, Take 325 mg by mouth daily with breakfast, Disp: , Rfl:     fluticasone (FLONASE) 50 mcg/act nasal spray, INSTILL ONE (1) SPRAY IN EACH NOSTRIL DAILY, Disp: 16 g, Rfl: 2    furosemide (LASIX) 20 mg tablet, TAKE 1/2 TABLET BY MOUTH EVERY DAY, Disp: 45 tablet, Rfl: 1    hydrALAZINE (APRESOLINE) 25 mg tablet, TAKE 1 TABLET BY MOUTH THREE TIMES A DAY, Disp: 270 tablet, Rfl: 1    Incontinence Supply Disposable ([place] Fresh Moist Wipes) MISC, Use 6 (six) times a day, Disp: 140 each, Rfl: 5    Incontinence Supply Disposable (Depend Underwear Large/XL) MISC, Use 6 (six) times a day, Disp: 180 each, Rfl: 5    Latanoprostene Bunod 0 024 % SOLN, Apply to eye, Disp: , Rfl:     linaGLIPtin (Tradjenta) 5 MG TABS, Take 5 mg by mouth daily, Disp: 90 tablet, Rfl: 3    losartan (COZAAR) 25 mg tablet, Take 1 tablet (25 mg total) by mouth daily, Disp: 90 tablet, Rfl: 3    meclizine (ANTIVERT) 25 mg tablet, Take 1 tablet (25 mg total) by mouth 2 (two) times a day, Disp: 180 tablet, Rfl: 3    memantine (NAMENDA) 10 mg tablet, Take 1 tablet (10 mg total) by mouth 2 (two) times a day, Disp: 180 tablet, Rfl: 3    Multiple Vitamin (Daily-Jr Multivitamin) TABS, Take 1 tablet by mouth daily, Disp: , Rfl:     Multiple Vitamins-Minerals (Centrum Silver) tablet, Take 1 tablet by mouth daily, Disp: 90 tablet, Rfl: 3    omeprazole (PriLOSEC) 20 mg delayed release capsule, Take 20 mg by mouth daily, Disp: , Rfl:     polyethylene glycol (MIRALAX) 17 g packet, Take 17 g by mouth daily as needed (constipation), Disp: 1 each, Rfl: 3    risperiDONE (RisperDAL) 0 25 mg tablet, Take 1 tablet (0 25 mg total) by mouth 2 (two) times a day, Disp: 180 tablet, Rfl: 3    senna-docusate sodium (SENOKOT S) 8 6-50 mg per tablet, Take 1 tablet by mouth daily at bedtime, Disp: 90 tablet, Rfl: 3    traZODone (DESYREL) 50 mg tablet, Take 1 tablet (50 mg total) by mouth daily at bedtime, Disp: 90 tablet, Rfl: 3    ALLERGIES:  Allergies   Allergen Reactions    Codeine            REVIEW OF SYSTEMS:  Review of Systems   Unable to perform ROS: Dementia       VITALS:  Vitals:       LABS:  HgA1c:   Lab Results   Component Value Date    HGBA1C 5 8 (H) 04/30/2022     BMP:   Lab Results   Component Value Date    CALCIUM 9 3 05/03/2022    K 5 1 05/03/2022    CO2 22 05/03/2022     05/03/2022    BUN 23 05/03/2022    CREATININE 1 59 (H) 05/03/2022       _____________________________________________________  PHYSICAL EXAMINATION:  General: well developed and well nourished, alert, oriented times 3 and appears comfortable  Psychiatric: Normal  HEENT: Normocephalic, Atraumatic Trachea Midline, No torticollis  Pulmonary: No audible wheezing or respiratory distress   Abdomen/GI: Non tender, non distended   Cardiovascular: No pitting edema, 2+ radial pulse   Skin: No masses, erythema, lacerations, fluctation, ulcerations  Neurovascular: Sensation Intact to the Median, Ulnar, Radial Nerve, Motor Intact to the Median, Ulnar, Radial Nerve and Pulses Intact  Musculoskeletal: Normal, except as noted in detailed exam and in HPI  MUSCULOSKELETAL EXAMINATION:  Bilateral hands: skin intact  Mild dorsal hand swelling to the right side  No swelling on the left  Non specific pain with palpation  Able to make full composite fist  Brisk capillary refill noted       ___________________________________________________  STUDIES REVIEWED:  None today       PROCEDURES PERFORMED:  Procedures  No Procedures performed today    _____________________________________________________      Wally Ben    I,:  Mykel Lassiter am acting as a scribe while in the presence of the attending physician :       I,:  Reggie Leblanc MD personally performed the services described in this documentation    as scribed in my presence :

## 2022-05-30 DIAGNOSIS — F02.80 LATE ONSET ALZHEIMER'S DISEASE WITHOUT BEHAVIORAL DISTURBANCE (HCC): ICD-10-CM

## 2022-05-30 DIAGNOSIS — G30.1 LATE ONSET ALZHEIMER'S DISEASE WITHOUT BEHAVIORAL DISTURBANCE (HCC): ICD-10-CM

## 2022-05-31 RX ORDER — DONEPEZIL HYDROCHLORIDE 10 MG/1
TABLET, FILM COATED ORAL
Qty: 180 TABLET | Refills: 0 | Status: SHIPPED | OUTPATIENT
Start: 2022-05-31

## 2022-06-15 DIAGNOSIS — M79.89 LEG SWELLING: ICD-10-CM

## 2022-06-19 RX ORDER — FUROSEMIDE 20 MG/1
TABLET ORAL
Qty: 45 TABLET | Refills: 1 | Status: SHIPPED | OUTPATIENT
Start: 2022-06-19

## 2022-06-20 ENCOUNTER — TELEPHONE (OUTPATIENT)
Dept: GERIATRICS | Age: 77
End: 2022-06-20

## 2022-06-20 NOTE — TELEPHONE ENCOUNTER
----- Message from Kelli Nuñez MD sent at 6/19/2022  4:08 PM EDT -----  Pls enquire from pt's daughter if the pt is still at the nursing home and if this is permanent   If it is, pls remove me as pcp

## 2022-06-22 LAB — HCV RNA SERPL QL NAA+PROBE: NORMAL

## 2022-09-08 ENCOUNTER — TELEPHONE (OUTPATIENT)
Dept: UROLOGY | Facility: CLINIC | Age: 77
End: 2022-09-08

## 2022-09-08 NOTE — TELEPHONE ENCOUNTER
PT WAS SUPPOSE TO HAVE A 6 MONTH F/U AND WAS SCHEDULED FOR 9/8/22  SHE TOLD THE NURSING HOME SHE REFUSED TO COME

## 2022-09-16 ENCOUNTER — OFFICE VISIT (OUTPATIENT)
Dept: UROLOGY | Facility: CLINIC | Age: 77
End: 2022-09-16
Payer: MEDICARE

## 2022-09-16 VITALS
HEIGHT: 65 IN | OXYGEN SATURATION: 98 % | SYSTOLIC BLOOD PRESSURE: 118 MMHG | DIASTOLIC BLOOD PRESSURE: 66 MMHG | HEART RATE: 70 BPM | BODY MASS INDEX: 31.78 KG/M2

## 2022-09-16 DIAGNOSIS — N39.0 RECURRENT UTI: Primary | ICD-10-CM

## 2022-09-16 PROCEDURE — 99213 OFFICE O/P EST LOW 20 MIN: CPT | Performed by: PHYSICIAN ASSISTANT

## 2022-09-16 RX ORDER — METHENAMINE HIPPURATE 1000 MG/1
1 TABLET ORAL 2 TIMES DAILY WITH MEALS
Qty: 60 TABLET | Refills: 6 | Status: SHIPPED | OUTPATIENT
Start: 2022-09-16

## 2022-09-16 RX ORDER — LATANOPROST 50 UG/ML
SOLUTION/ DROPS OPHTHALMIC
COMMUNITY
Start: 2022-08-05

## 2022-09-16 RX ORDER — APIXABAN 5 MG/1
TABLET, FILM COATED ORAL
COMMUNITY
Start: 2022-09-02

## 2022-09-16 RX ORDER — AMOXICILLIN AND CLAVULANATE POTASSIUM 500; 125 MG/1; MG/1
1 TABLET, FILM COATED ORAL EVERY 12 HOURS SCHEDULED
Qty: 6 TABLET | Refills: 0 | Status: SHIPPED | OUTPATIENT
Start: 2022-09-16 | End: 2022-09-19

## 2022-09-16 RX ORDER — MIRTAZAPINE 15 MG/1
TABLET, FILM COATED ORAL
COMMUNITY
Start: 2022-08-24

## 2022-09-16 RX ORDER — DORZOLAMIDE HYDROCHLORIDE AND TIMOLOL MALEATE PRESERVATIVE FREE 20; 5 MG/ML; MG/ML
SOLUTION/ DROPS OPHTHALMIC
COMMUNITY
Start: 2022-09-15

## 2022-09-16 NOTE — PROGRESS NOTES
1  Recurrent UTI  amoxicillin-clavulanate (Augmentin) 500-125 mg per tablet    methenamine hippurate (HIPREX) 1 g tablet        Assessment and plan:       1  Recurrent urinary infections  - s/p normal cystoscopy and upper tract imaging  - sterilize with augmentin x3 days followed by hiprex for infection suppression    Aminah Patton PA-C      Chief Complaint     Recurrent urinary infections    History of Present Illness     Ryland Bishop is a 69 y o  female presenting today for recurrent urinary tract infections  Recurrent UTI - symptoms typically aggressive behavior that improves with antibiotics  Multiple positive cultures  No gross hematuria      Denies any history of  surgical manipulation that she is aware of  She does recall hysterectomy in her 35s for uterine prolapse  Unaware if any vaginal mesh was placed at that time  Underwent cystoscopy in the office 6/2021 which was negative for any evidence of  malignancy    Patient's history was provided by her daughter flaco Heredia due to patient's mental status  She reports history of recurrent urinary infections  Given patient's Alzheimer's she is concerned that patient would unable to relay her symptoms  She does report progressive cognitive decline over the past year  Medical comorbidities include diabetes, Alzheimers  Laboratory     Lab Results   Component Value Date    CREATININE 1 59 (H) 05/03/2022       Review of Systems     Review of Systems   Constitutional: Negative for activity change, appetite change, chills, diaphoresis, fatigue, fever and unexpected weight change  Respiratory: Negative for chest tightness and shortness of breath  Cardiovascular: Negative for chest pain, palpitations and leg swelling  Gastrointestinal: Negative for abdominal distention, abdominal pain, constipation, diarrhea, nausea and vomiting     Genitourinary: Negative for decreased urine volume, difficulty urinating, dysuria, enuresis, flank pain, frequency, genital sores, hematuria and urgency  Musculoskeletal: Negative for back pain, gait problem and myalgias  Skin: Negative for color change, pallor, rash and wound  Psychiatric/Behavioral: Negative for behavioral problems  The patient is not nervous/anxious  Allergies     Allergies   Allergen Reactions    Codeine        Physical Exam     Physical Exam  Constitutional:       General: She is not in acute distress  Appearance: Normal appearance  She is normal weight  She is not ill-appearing, toxic-appearing or diaphoretic  HENT:      Head: Normocephalic and atraumatic  Eyes:      General:         Right eye: No discharge  Left eye: No discharge  Conjunctiva/sclera: Conjunctivae normal    Pulmonary:      Effort: Pulmonary effort is normal  No respiratory distress  Musculoskeletal:      Comments: Ambulates with wheelchair assistance   Neurological:      Mental Status: She is alert  Comments: Alert  Periodically confused throughout exam   Psychiatric:         Mood and Affect: Mood normal          Behavior: Behavior normal          Thought Content:  Thought content normal          Judgment: Judgment normal        Vital Signs     Vitals:    09/16/22 1127   BP: 118/66   Pulse: 70   SpO2: 98%   Height: 5' 5" (1 651 m)         Current Medications       Current Outpatient Medications:     amLODIPine (NORVASC) 10 mg tablet, TAKE 1 TABLET BY MOUTH EVERY DAY, Disp: 90 tablet, Rfl: 1    amoxicillin-clavulanate (Augmentin) 500-125 mg per tablet, Take 1 tablet by mouth every 12 (twelve) hours for 3 days, Disp: 6 tablet, Rfl: 0    Ascorbic Acid (VITAMIN C) 100 MG tablet, Take 100 mg by mouth daily, Disp: , Rfl:     aspirin (ECOTRIN LOW STRENGTH) 81 mg EC tablet, Take 81 mg by mouth daily, Disp: , Rfl:     atorvastatin (LIPITOR) 40 mg tablet, Take 1 tablet (40 mg total) by mouth daily, Disp: 90 tablet, Rfl: 2    buPROPion (WELLBUTRIN SR) 150 mg 12 hr tablet, Take 1 tablet (150 mg total) by mouth 2 (two) times a day, Disp: 180 tablet, Rfl: 2    Calcifediol ER (Rayaldee) 30 MCG CPCR, Take 30 mcg by mouth daily, Disp: 14 capsule, Rfl: 0    cetirizine (ZyrTEC) 10 mg tablet, Take 10 mg by mouth daily, Disp: , Rfl:     citalopram (CeleXA) 40 mg tablet, Take 1 tablet (40 mg total) by mouth daily, Disp: 90 tablet, Rfl: 2    donepezil (ARICEPT) 10 mg tablet, TAKE 1 TABLET BY MOUTH DAILY AT BEDTIME, Disp: 180 tablet, Rfl: 0    dorzolamide (TRUSOPT) 2 % ophthalmic solution, 1 drop 3 (three) times a day, Disp: , Rfl:     Dorzolamide HCl-Timolol Mal PF 2-0 5 % SOLN, , Disp: , Rfl:     Eliquis 5 MG, , Disp: , Rfl:     ferrous sulfate 325 (65 Fe) mg tablet, Take 325 mg by mouth daily with breakfast, Disp: , Rfl:     fluticasone (FLONASE) 50 mcg/act nasal spray, INSTILL ONE (1) SPRAY IN EACH NOSTRIL DAILY, Disp: 16 g, Rfl: 2    furosemide (LASIX) 20 mg tablet, TAKE 1/2 TABLET BY MOUTH EVERY DAY, Disp: 45 tablet, Rfl: 1    hydrALAZINE (APRESOLINE) 25 mg tablet, TAKE 1 TABLET BY MOUTH THREE TIMES A DAY, Disp: 270 tablet, Rfl: 1    Incontinence Supply Disposable ([place] Fresh Moist Wipes) MISC, Use 6 (six) times a day, Disp: 140 each, Rfl: 5    Incontinence Supply Disposable (Depend Underwear Large/XL) MISC, Use 6 (six) times a day, Disp: 180 each, Rfl: 5    linaGLIPtin (Tradjenta) 5 MG TABS, Take 5 mg by mouth daily, Disp: 90 tablet, Rfl: 3    losartan (COZAAR) 25 mg tablet, Take 1 tablet (25 mg total) by mouth daily, Disp: 90 tablet, Rfl: 3    meclizine (ANTIVERT) 25 mg tablet, Take 1 tablet (25 mg total) by mouth 2 (two) times a day, Disp: 180 tablet, Rfl: 3    memantine (NAMENDA) 10 mg tablet, Take 1 tablet (10 mg total) by mouth 2 (two) times a day, Disp: 180 tablet, Rfl: 3    methenamine hippurate (HIPREX) 1 g tablet, Take 1 tablet (1 g total) by mouth 2 (two) times a day with meals, Disp: 60 tablet, Rfl: 6    mirtazapine (REMERON) 15 mg tablet, , Disp: , Rfl:     Multiple Vitamins-Minerals (Centrum Silver) tablet, Take 1 tablet by mouth daily, Disp: 90 tablet, Rfl: 3    omeprazole (PriLOSEC) 20 mg delayed release capsule, Take 20 mg by mouth daily, Disp: , Rfl:     polyethylene glycol (MIRALAX) 17 g packet, Take 17 g by mouth daily as needed (constipation), Disp: 1 each, Rfl: 3    risperiDONE (RisperDAL) 0 25 mg tablet, Take 1 tablet (0 25 mg total) by mouth 2 (two) times a day, Disp: 180 tablet, Rfl: 3    senna-docusate sodium (SENOKOT S) 8 6-50 mg per tablet, Take 1 tablet by mouth daily at bedtime, Disp: 90 tablet, Rfl: 3    traZODone (DESYREL) 50 mg tablet, Take 1 tablet (50 mg total) by mouth daily at bedtime, Disp: 90 tablet, Rfl: 3    Azopt 1 % ophthalmic suspension, , Disp: , Rfl:     Brimonidine Tartrate-Timolol (COMBIGAN OP), Apply to eye, Disp: , Rfl:     diclofenac sodium (VOLTAREN) 1 %, Apply 2 g topically 3 (three) times a day as needed (Pain) (Patient not taking: Reported on 9/16/2022), Disp: 100 g, Rfl: 1    latanoprost (XALATAN) 0 005 % ophthalmic solution, , Disp: , Rfl:     Latanoprostene Bunod 0 024 % SOLN, Apply to eye, Disp: , Rfl:     Multiple Vitamin (Daily-Jr Multivitamin) TABS, Take 1 tablet by mouth daily (Patient not taking: Reported on 9/16/2022), Disp: , Rfl:       Active Problems     Patient Active Problem List   Diagnosis    History of recurrent UTIs    Sleep difficulties    Late onset Alzheimer's disease without behavioral disturbance (HCC)    Moderate episode of recurrent major depressive disorder ([facility] [address] )    Vitamin D deficiency    Other hyperlipidemia    Type 2 diabetes mellitus with stage 3b chronic kidney disease and hypertension (HCC)    Bilateral primary osteoarthritis of knee    Polypharmacy    Anxiety    Gait instability    Vertigo    Essential hypertension    Slow transit constipation    Urine incontinence    Low bicarbonate level    Leg swelling    Vision impairment    CKD (chronic kidney disease)    Medicare annual wellness visit, subsequent    FTT (failure to thrive) in adult    Non-traumatic rhabdomyolysis    UTI (urinary tract infection)         Past Medical History     Past Medical History:   Diagnosis Date    Acute cystitis without hematuria 8/13/2020    Allergic rhinitis 5/18/2021    Anxiety     Arm swelling 8/30/2021    Cholesterol depletion     Confusion     Confusion 5/17/2021    Dementia ([facility])     Depression     Diabetes ([facility])     Early onset Alzheimer's dementia ([facility] [address] )     Encounter for screening colonoscopy 9/22/2020    Glaucoma     Hypertension     Hypertension     Memory loss     Right leg swelling 8/30/2021         Surgical History     Past Surgical History:   Procedure Laterality Date    CYSTOSCOPY  06/10/2021    HYSTERECTOMY           Family History     Family History   Problem Relation Age of Onset    Diabetes Mother     Hypertension Mother        Social History     Social History       Radiology

## 2022-10-11 PROBLEM — N39.0 UTI (URINARY TRACT INFECTION): Status: RESOLVED | Noted: 2022-05-02 | Resolved: 2022-10-11

## 2022-10-12 PROBLEM — Z00.00 MEDICARE ANNUAL WELLNESS VISIT, SUBSEQUENT: Status: RESOLVED | Noted: 2022-02-06 | Resolved: 2022-10-12

## 2023-10-20 NOTE — TELEPHONE ENCOUNTER
Referral placed again  Thanks Detail Level: Zone Render Risk Assessment In Note?: no Additional Notes: Pt states she’s been using TAC QD X 1 year. Instructed to discontinue and educated pt on proper steroid use. Pt’s oncology sent her here today for biopsy’s, will fax over when we receive results.

## 2025-03-10 NOTE — TELEPHONE ENCOUNTER
Can you pls let the dtr know that when pts are in patient, appointments are usually rescheduled   We can certainly set up a telephone call for the dtr instead if she wants to update me on the plans complaining of several days of sore throat, dry cough, nasal congestion, and left ear pain  -Flu/COVID/RSV PCR negative  -CXR no PNA   -suspect viral URI  -c/w augmentin 875 mg bid   -supportive care  -start flonase BID  -benzocaine lozenge PRN  -PPI for possible component of GERD possibly due to uncontrolled HTN  troponins 228>242>228  s/p asa 81 mg 3/9  c/w atorvastatin + metoprolol   f/u echo